# Patient Record
Sex: MALE | Race: BLACK OR AFRICAN AMERICAN | NOT HISPANIC OR LATINO | Employment: FULL TIME | URBAN - METROPOLITAN AREA
[De-identification: names, ages, dates, MRNs, and addresses within clinical notes are randomized per-mention and may not be internally consistent; named-entity substitution may affect disease eponyms.]

---

## 2018-02-19 ENCOUNTER — OFFICE VISIT (OUTPATIENT)
Dept: SURGERY | Facility: CLINIC | Age: 53
End: 2018-02-19
Payer: COMMERCIAL

## 2018-02-19 VITALS
BODY MASS INDEX: 34.36 KG/M2 | DIASTOLIC BLOOD PRESSURE: 80 MMHG | HEIGHT: 69 IN | WEIGHT: 232 LBS | SYSTOLIC BLOOD PRESSURE: 130 MMHG

## 2018-02-19 DIAGNOSIS — D49.0 COLON TUMOR: Primary | ICD-10-CM

## 2018-02-19 PROCEDURE — 99204 OFFICE O/P NEW MOD 45 MIN: CPT | Mod: ,,, | Performed by: SURGERY

## 2018-02-19 PROCEDURE — 3008F BODY MASS INDEX DOCD: CPT | Mod: ,,, | Performed by: SURGERY

## 2018-02-19 RX ORDER — ATORVASTATIN CALCIUM 20 MG/1
TABLET, FILM COATED ORAL
COMMUNITY
Start: 2017-05-12 | End: 2018-03-01 | Stop reason: DRUGHIGH

## 2018-02-19 RX ORDER — AZILSARTAN KAMEDOXOMIL AND CHLORTHALIDONE 40; 25 MG/1; MG/1
TABLET ORAL
COMMUNITY
Start: 2018-01-11 | End: 2018-08-10

## 2018-02-19 RX ORDER — AMLODIPINE BESYLATE 5 MG/1
5 TABLET ORAL
COMMUNITY
End: 2018-08-10 | Stop reason: SDUPTHER

## 2018-02-19 NOTE — PROGRESS NOTES
Subjective:       Patient ID: Vince Kwon is a 52 y.o. male.    Chief Complaint: Other (Referred by  to eval colon mass)      HPI:  Patient was found to have her large tumor in his right colon on colonoscopy. Patient is a bit of a poor historian. From talking him am not sure whether this was a screening colonoscopy or if he had some midabdominal pain and that was the reason for the scope. He did have some pain but is been going on for years and usually associated with spicy foods.    Currently patient does not have any symptoms. He does have an appointment with oncologist later today. He was not 100 percent aware that he had colon tumor.    Past Medical History:   Diagnosis Date    Hyperlipidemia     Hypertension      Past Surgical History:   Procedure Laterality Date    FINGER SURGERY       Review of patient's allergies indicates:  No Known Allergies  Medication List with Changes/Refills   Current Medications    AMLODIPINE (NORVASC) 5 MG TABLET    Take 5 mg by mouth.    ATORVASTATIN (LIPITOR) 20 MG TABLET    TAKE 1 TABLET BY MOUTH EVERY DAY    EDARBYCLOR 40-25 MG TAB         Family History   Problem Relation Age of Onset    Diabetes Mother     Heart disease Mother     Diabetes Sister     Breast cancer Sister     Diabetes Maternal Aunt     Diabetes Maternal Grandmother     Heart disease Maternal Grandmother     Heart disease Maternal Grandfather      Social History     Social History    Marital status: Single     Spouse name: N/A    Number of children: N/A    Years of education: N/A     Social History Main Topics    Smoking status: Former Smoker    Smokeless tobacco: Never Used    Alcohol use Yes      Comment: occ    Drug use: No    Sexual activity: Not Asked     Other Topics Concern    None     Social History Narrative    None         Review of Systems   Constitutional: Negative for appetite change, chills, fever and unexpected weight change.   HENT: Negative for hearing loss,  rhinorrhea, sore throat and voice change.    Eyes: Negative for photophobia and visual disturbance.   Respiratory: Negative for cough, choking and shortness of breath.    Cardiovascular: Negative for chest pain, palpitations and leg swelling.   Gastrointestinal: Negative for abdominal pain, blood in stool, constipation, diarrhea, nausea and vomiting.   Endocrine: Negative for cold intolerance, heat intolerance, polydipsia and polyuria.   Musculoskeletal: Negative for arthralgias, back pain, joint swelling and neck stiffness.   Skin: Negative for color change, pallor and rash.   Neurological: Negative for dizziness, seizures, syncope and headaches.   Hematological: Negative for adenopathy. Does not bruise/bleed easily.   Psychiatric/Behavioral: Negative for agitation, behavioral problems and confusion.       Objective:      Physical Exam   Constitutional: He appears well-developed and well-nourished.  Non-toxic appearance. No distress.   HENT:   Head: Normocephalic and atraumatic. Head is without abrasion and without laceration.   Right Ear: External ear normal.   Left Ear: External ear normal.   Nose: Nose normal.   Mouth/Throat: Oropharynx is clear and moist.   Eyes: EOM are normal. Pupils are equal, round, and reactive to light.   Neck: Trachea normal. No tracheal deviation and normal range of motion present. No thyroid mass and no thyromegaly present.   Cardiovascular: Normal rate and regular rhythm.    Pulmonary/Chest: Effort normal. No accessory muscle usage. No tachypnea. No respiratory distress.   Abdominal: Soft. Normal appearance and bowel sounds are normal. He exhibits no distension and no mass. There is no hepatosplenomegaly. There is no tenderness. There is no tenderness at McBurney's point and negative Park's sign. No hernia.   Lymphadenopathy:     He has no cervical adenopathy.     He has no axillary adenopathy.        Right: No inguinal adenopathy present.        Left: No inguinal adenopathy  present.   Neurological: He is alert. Coordination and gait normal.   Skin: Skin is warm and intact.   Psychiatric: He has a normal mood and affect. His speech is normal and behavior is normal.       Assessment/Plan:   Colon tumor  -     CBC auto differential; Future; Expected date: 02/19/2018  -     CEA; Future; Expected date: 02/19/2018  -     Comprehensive metabolic panel; Future; Expected date: 02/19/2018  -     EKG 12-lead; Future  -     Ambulatory Referral to External Surgery        Planned procedure: Right colectomy    Mefoxin 2 gm IV on call to OR    NPO past midnight    Wade cloth scrub per protocol    SCDs Bilateral Lower Extremities    I discussed the proposed procedures the the patient including risks, benefits, indications, alternatives and special concerns.  The patient appears to understand and agrees to go ahead with surgery.  I have made no promises, warranties or verbal agreements beyond what was discussed above.    No Follow-up on file.

## 2018-02-20 ENCOUNTER — OFFICE VISIT (OUTPATIENT)
Dept: HEMATOLOGY/ONCOLOGY | Facility: CLINIC | Age: 53
End: 2018-02-20
Payer: COMMERCIAL

## 2018-02-20 VITALS
DIASTOLIC BLOOD PRESSURE: 87 MMHG | SYSTOLIC BLOOD PRESSURE: 154 MMHG | RESPIRATION RATE: 18 BRPM | BODY MASS INDEX: 34.96 KG/M2 | WEIGHT: 230.69 LBS | HEART RATE: 61 BPM | TEMPERATURE: 99 F | HEIGHT: 68 IN

## 2018-02-20 DIAGNOSIS — K63.89 COLONIC MASS: ICD-10-CM

## 2018-02-20 PROCEDURE — 99204 OFFICE O/P NEW MOD 45 MIN: CPT | Mod: ,,, | Performed by: INTERNAL MEDICINE

## 2018-02-20 PROCEDURE — 3008F BODY MASS INDEX DOCD: CPT | Mod: ,,, | Performed by: INTERNAL MEDICINE
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
## 2018-02-20 NOTE — LETTER
February 20, 2018      Jose Hoang Jr., MD  [address]  [address]  [place] [facility] [address]           Critical access hospital Hematology Oncology  [address]  [address]  [place] [address]  Phone: [phone]  Fax: [phone]          Patient: Vince Kwon   MR Number: [id number]   YOB: 1965   Date of Visit: 2/20/2018       Dear Dr. Jose Hoang Jr.:    Thank you for referring Vince Kwon to me for evaluation. Attached you will find relevant portions of my assessment and plan of care.    If you have questions, please do not hesitate to call me. I look forward to following Vince Kwon along with you.    Sincerely,    Carlo Welch MD    Enclosure  CC:  No Recipients    If you would like to receive this communication electronically, please contact [email or web address] or [phone] to request more information on [facility] access.    For providers and/or their staff who would like to refer a patient to [facility], please contact us through our one-stop-shop provider referral line, [facility], at [phone].    If you feel you have received this communication in error or would no longer like to receive these types of communications, please e-mail [email or web address]

## 2018-02-27 ENCOUNTER — TELEPHONE (OUTPATIENT)
Dept: INTERNAL MEDICINE | Facility: CLINIC | Age: 53
End: 2018-02-27

## 2018-02-27 NOTE — TELEPHONE ENCOUNTER
Sent to doctor wooten    ----- Message from Priscilla Calle sent at 2/27/2018 12:42 PM CST -----  Path report 2/16/18

## 2018-02-28 LAB
ALBUMIN SERPL-MCNC: 4.1 G/DL (ref 3.1–4.7)
ALP SERPL-CCNC: 74 IU/L (ref 40–104)
ALT (SGPT): 19 IU/L (ref 3–33)
AST SERPL-CCNC: 29 IU/L (ref 10–40)
BASOPHILS NFR BLD: 0 K/UL (ref 0–0.2)
BASOPHILS NFR BLD: 0.3 %
BILIRUB SERPL-MCNC: 0.9 MG/DL (ref 0.3–1)
BUN SERPL-MCNC: 15 MG/DL (ref 8–20)
CALCIUM SERPL-MCNC: 9.6 MG/DL (ref 7.7–10.4)
CEA: 36.7 NG/ML
CHLORIDE: 100 MMOL/L (ref 98–110)
CO2 SERPL-SCNC: 30.8 MMOL/L (ref 22.8–31.6)
CREATININE: 1.58 MG/DL (ref 0.6–1.4)
EOSINOPHIL NFR BLD: 0.3 K/UL (ref 0–0.7)
EOSINOPHIL NFR BLD: 5.5 %
ERYTHROCYTE [DISTWIDTH] IN BLOOD BY AUTOMATED COUNT: 12.9 % (ref 11.7–14.9)
GLUCOSE: 97 MG/DL (ref 70–99)
GRAN #: 3.1 K/UL (ref 1.4–6.5)
GRAN%: 50.7 %
HCT VFR BLD AUTO: 41.1 % (ref 39–55)
HGB BLD-MCNC: 13.7 G/DL (ref 14–16)
IMMATURE GRANS (ABS): 0 K/UL (ref 0–1)
IMMATURE GRANULOCYTES: 0.2 %
LYMPH #: 1.9 K/UL (ref 1.2–3.4)
LYMPH%: 32.3 %
MCH RBC QN AUTO: 27.5 PG (ref 25–35)
MCHC RBC AUTO-ENTMCNC: 33.3 G/DL (ref 31–36)
MCV RBC AUTO: 82.5 FL (ref 80–100)
MONO #: 0.7 K/UL (ref 0.1–0.6)
MONO%: 11 %
NUCLEATED RBCS: 0 %
PLATELET # BLD AUTO: 224 K/UL (ref 140–440)
PMV BLD AUTO: 9.9 FL (ref 8.8–12.7)
POTASSIUM SERPL-SCNC: 2.8 MMOL/L (ref 3.5–5)
PROT SERPL-MCNC: 7.3 G/DL (ref 6–8.2)
RBC # BLD AUTO: 4.98 M/UL (ref 4.3–5.9)
SODIUM: 142 MMOL/L (ref 134–144)
WBC # BLD AUTO: 6 K/UL (ref 5–10)

## 2018-03-01 PROBLEM — E87.6 HYPOKALEMIA: Status: ACTIVE | Noted: 2018-03-01

## 2018-03-01 PROBLEM — M19.019 OSTEOARTHROSIS, LOCALIZED, PRIMARY, SHOULDER REGION: Status: ACTIVE | Noted: 2017-06-06

## 2018-03-01 PROBLEM — C18.2 PRIMARY ADENOCARCINOMA OF ASCENDING COLON: Status: ACTIVE | Noted: 2018-02-20

## 2018-03-01 PROBLEM — R73.01 IMPAIRED FASTING GLUCOSE: Status: ACTIVE | Noted: 2018-03-01

## 2018-03-01 PROBLEM — S43.109S: Status: ACTIVE | Noted: 2017-06-06

## 2018-03-01 RX ORDER — ATORVASTATIN CALCIUM 40 MG/1
40 TABLET, FILM COATED ORAL DAILY
COMMUNITY
End: 2018-04-26

## 2018-03-01 RX ORDER — POTASSIUM CHLORIDE 20 MEQ/1
20 TABLET, EXTENDED RELEASE ORAL DAILY
COMMUNITY
End: 2018-04-26 | Stop reason: SDUPTHER

## 2018-03-01 RX ORDER — LABETALOL 300 MG/1
300 TABLET, FILM COATED ORAL 2 TIMES DAILY
COMMUNITY
End: 2019-03-08 | Stop reason: SDUPTHER

## 2018-03-01 RX ORDER — OMEPRAZOLE 40 MG/1
40 CAPSULE, DELAYED RELEASE ORAL DAILY
COMMUNITY
End: 2018-03-29 | Stop reason: SDUPTHER

## 2018-03-02 ENCOUNTER — OFFICE VISIT (OUTPATIENT)
Dept: INTERNAL MEDICINE | Facility: CLINIC | Age: 53
End: 2018-03-02
Payer: COMMERCIAL

## 2018-03-02 VITALS
BODY MASS INDEX: 35.01 KG/M2 | HEIGHT: 68 IN | HEART RATE: 77 BPM | TEMPERATURE: 98 F | DIASTOLIC BLOOD PRESSURE: 80 MMHG | SYSTOLIC BLOOD PRESSURE: 130 MMHG | OXYGEN SATURATION: 95 % | WEIGHT: 231 LBS

## 2018-03-02 DIAGNOSIS — E87.6 HYPOKALEMIA: ICD-10-CM

## 2018-03-02 DIAGNOSIS — Z01.818 PREOPERATIVE EXAMINATION: Primary | ICD-10-CM

## 2018-03-02 PROCEDURE — 99213 OFFICE O/P EST LOW 20 MIN: CPT | Mod: ,,, | Performed by: INTERNAL MEDICINE

## 2018-03-02 NOTE — PROGRESS NOTES
Subjective:       Patient ID: Vince Kwon is a 52 y.o. male.    Chief Complaint: Pre-op Exam    Here for preoperative evaluation.  Patient was recently sent for a screening colonoscopy and was found to have an adenocarcinoma in his ascending colon.  He is scheduled for partial colectomy with reanastomosis vs colostomy depending on findings at surgery.  He has a h/o HTN, hyperlipidemia but no known CAD.  Did have TIA in 2005 but hasn't had any other issues on ASA therapy (currently on hold for surgery).  Had preop labs earlier this week;  Creat 1.58, K 2.8.  EKG with nonspecific TWI in III.  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  Physically demanding job and does not have any chest pain or SOB.   Has a h/o hypokalemia but hadn't been taking potassium due to constipation.  Has been taking it the last two days since he did the labs.        Review of Systems   Constitutional: Negative for chills, diaphoresis, fatigue, fever and unexpected weight change.   HENT: Negative for congestion, ear discharge, ear pain, hearing loss, postnasal drip, rhinorrhea, trouble swallowing and voice change.    Eyes: Negative for photophobia, pain, discharge, redness, itching and visual disturbance.   Respiratory: Negative for apnea, cough, choking, chest tightness, shortness of breath and wheezing.    Cardiovascular: Negative for chest pain, palpitations and leg swelling.   Gastrointestinal: Negative for abdominal pain, blood in stool, constipation, diarrhea, nausea, rectal pain and vomiting.   Endocrine: Negative for cold intolerance, heat intolerance, polydipsia and polyuria.   Genitourinary: Negative for decreased urine volume, difficulty urinating, discharge, dysuria, flank pain, frequency, genital sores, hematuria, penile pain, penile swelling, scrotal swelling, testicular pain and urgency.   Musculoskeletal: Negative for arthralgias, back pain, gait problem, joint swelling, myalgias, neck pain  and neck stiffness.   Skin: Negative for color change, rash and wound.   Allergic/Immunologic: Negative for environmental allergies and food allergies.   Neurological: Negative for dizziness, tremors, seizures, syncope, facial asymmetry, speech difficulty, weakness, light-headedness, numbness and headaches.   Hematological: Negative for adenopathy. Does not bruise/bleed easily.   Psychiatric/Behavioral: Negative for confusion, hallucinations, sleep disturbance and suicidal ideas. The patient is not nervous/anxious.        Past Medical History:   Diagnosis Date    Colonic mass - right colon 2/20/2018    Hyperlipidemia     Hypertension     Hypokalemia 3/1/2018      Past Surgical History:   Procedure Laterality Date    FINGER SURGERY         Family History   Problem Relation Age of Onset    Diabetes Mother     Heart disease Mother     Diabetes Sister     Breast cancer Sister     Diabetes Maternal Aunt     Diabetes Maternal Grandmother     Heart disease Maternal Grandmother     Heart disease Maternal Grandfather        Social History     Social History    Marital status: Single     Spouse name: N/A    Number of children: N/A    Years of education: N/A     Occupational History    shipping and recieving      Social History Main Topics    Smoking status: Former Smoker    Smokeless tobacco: Never Used    Alcohol use Yes      Comment: occ    Drug use: No    Sexual activity: Not Asked     Other Topics Concern    None     Social History Narrative    Lives alone       Current Outpatient Prescriptions   Medication Sig Dispense Refill    amLODIPine (NORVASC) 5 MG tablet Take 5 mg by mouth.      atorvastatin (LIPITOR) 40 MG tablet Take 40 mg by mouth once daily.      EDARBYCLOR 40-25 mg Tab       labetalol (NORMODYNE) 300 MG tablet Take 300 mg by mouth 2 (two) times daily.      omeprazole (PRILOSEC) 40 MG capsule Take 40 mg by mouth once daily.      potassium chloride SA (K-DUR,KLOR-CON) 20 MEQ tablet  "Take 20 mEq by mouth once daily.       No current facility-administered medications for this visit.        Review of patient's allergies indicates:  No Known Allergies  Objective:      Blood pressure 130/80, pulse 77, temperature 98.2 °F (36.8 °C), temperature source Temporal, height 5' 8" (1.727 m), weight 104.8 kg (231 lb), SpO2 95 %. Body mass index is 35.12 kg/m².   Physical Exam   Constitutional: He appears well-developed. No distress.   HENT:   Nose: Nose normal.   Mouth/Throat: Oropharynx is clear and moist.   Eyes: Conjunctivae are normal. Right eye exhibits no discharge. Left eye exhibits no discharge. No scleral icterus.   Neck: Carotid bruit is not present.   Cardiovascular: Normal rate, regular rhythm and normal heart sounds.    No murmur heard.  Pulmonary/Chest: Effort normal and breath sounds normal. No respiratory distress. He has no decreased breath sounds. He has no wheezes. He has no rhonchi. He has no rales.   Abdominal: Soft. He exhibits no distension. There is no tenderness. There is no rebound and no guarding.   Musculoskeletal: He exhibits no edema.   Neurological: He is alert. He displays no tremor.   Skin: Skin is warm and dry.   Psychiatric: He has a normal mood and affect. His speech is normal.   Nursing note and vitals reviewed.          Assessment:       1. Preoperative examination    2. Hypokalemia        Plan:       Vince was seen today for pre-op exam.    Diagnoses and all orders for this visit:    Preoperative examination  Comments:  No known CAD, >4 METS activity level without symptoms, nonspecific findings on EKG.  No further cardiac workup required.  Standard DVT precautions.    Hypokalemia  Comments:  Take potassium BID today and then go back to once daily.  Recheck Monday.  Orders:  -     Basic metabolic panel; Future  -     Basic metabolic panel         "

## 2018-03-05 LAB
BUN SERPL-MCNC: 17 MG/DL (ref 8–20)
CALCIUM SERPL-MCNC: 9.9 MG/DL (ref 7.7–10.4)
CHLORIDE: 100 MMOL/L (ref 98–110)
CO2 SERPL-SCNC: 32.4 MMOL/L (ref 22.8–31.6)
CREATININE: 1.56 MG/DL (ref 0.6–1.4)
GLUCOSE: 103 MG/DL (ref 70–99)
POTASSIUM SERPL-SCNC: 3.5 MMOL/L (ref 3.5–5)
SODIUM: 143 MMOL/L (ref 134–144)

## 2018-03-06 ENCOUNTER — TELEPHONE (OUTPATIENT)
Dept: HEMATOLOGY/ONCOLOGY | Facility: CLINIC | Age: 53
End: 2018-03-06

## 2018-03-06 NOTE — TELEPHONE ENCOUNTER
----- Message from Brittanie Mo sent at 3/6/2018 10:11 AM CST -----  Pt sister alaina diaz called in said dr tapia wrote him two rx for pt before his surgery tomorrow the medication is  Erythromycin 500mg and neomycin 500mg. He was suppose to take this medication before the surgery (she wasn't sure how many days before)   Said one medication was 600 and other one was 241 and they cant afford that.  Pt did not start the medication and has surgery on his colon tomorrow.  I tried to get more information but pt sister was in a hurry getting her mammogram done.  Call back  [phone]    Called Judy at Dr. Gutierrez's office. She said it was her office that gave the scripts to him. She said she will call him and see what he needs.

## 2018-03-08 LAB
BASOPHILS NFR BLD: 0 K/UL (ref 0–0.2)
BASOPHILS NFR BLD: 0.1 %
BUN SERPL-MCNC: 10 MG/DL (ref 8–20)
CALCIUM SERPL-MCNC: 8.4 MG/DL (ref 7.7–10.4)
CHLORIDE: 99 MMOL/L (ref 98–110)
CO2 SERPL-SCNC: 31.3 MMOL/L (ref 22.8–31.6)
CREATININE: 1.19 MG/DL (ref 0.6–1.4)
EOSINOPHIL NFR BLD: 0.1 K/UL (ref 0–0.7)
EOSINOPHIL NFR BLD: 1.7 %
ERYTHROCYTE [DISTWIDTH] IN BLOOD BY AUTOMATED COUNT: 13.1 % (ref 11.7–14.9)
GLUCOSE: 107 MG/DL (ref 70–99)
GRAN #: 5.9 K/UL (ref 1.4–6.5)
GRAN%: 71 %
HCT VFR BLD AUTO: 42.1 % (ref 39–55)
HGB BLD-MCNC: 13.8 G/DL (ref 14–16)
IMMATURE GRANS (ABS): 0 K/UL (ref 0–1)
IMMATURE GRANULOCYTES: 0.2 %
LYMPH #: 1.5 K/UL (ref 1.2–3.4)
LYMPH%: 18.3 %
MCH RBC QN AUTO: 27.7 PG (ref 25–35)
MCHC RBC AUTO-ENTMCNC: 32.8 G/DL (ref 31–36)
MCV RBC AUTO: 84.4 FL (ref 80–100)
MONO #: 0.7 K/UL (ref 0.1–0.6)
MONO%: 8.7 %
NUCLEATED RBCS: 0 %
PLATELET # BLD AUTO: 215 K/UL (ref 140–440)
PMV BLD AUTO: 9.3 FL (ref 8.8–12.7)
POTASSIUM SERPL-SCNC: 2.7 MMOL/L (ref 3.5–5)
RBC # BLD AUTO: 4.99 M/UL (ref 4.3–5.9)
SODIUM: 137 MMOL/L (ref 134–144)
WBC # BLD AUTO: 8.4 K/UL (ref 5–10)

## 2018-03-09 LAB
BASOPHILS NFR BLD: 0 K/UL (ref 0–0.2)
BASOPHILS NFR BLD: 0.2 %
BUN SERPL-MCNC: 6 MG/DL (ref 8–20)
CALCIUM SERPL-MCNC: 8.9 MG/DL (ref 7.7–10.4)
CHLORIDE: 96 MMOL/L (ref 98–110)
CO2 SERPL-SCNC: 35 MMOL/L (ref 22.8–31.6)
CREATININE: 1.11 MG/DL (ref 0.6–1.4)
EOSINOPHIL NFR BLD: 0 K/UL (ref 0–0.7)
EOSINOPHIL NFR BLD: 0.3 %
ERYTHROCYTE [DISTWIDTH] IN BLOOD BY AUTOMATED COUNT: 13 % (ref 11.7–14.9)
GLUCOSE: 135 MG/DL (ref 70–99)
GRAN #: 7.8 K/UL (ref 1.4–6.5)
GRAN%: 77.5 %
HCT VFR BLD AUTO: 45.3 % (ref 39–55)
HGB BLD-MCNC: 14.9 G/DL (ref 14–16)
IMMATURE GRANS (ABS): 0.1 K/UL (ref 0–1)
IMMATURE GRANULOCYTES: 0.5 %
LYMPH #: 0.8 K/UL (ref 1.2–3.4)
LYMPH%: 8.4 %
MCH RBC QN AUTO: 27.6 PG (ref 25–35)
MCHC RBC AUTO-ENTMCNC: 32.9 G/DL (ref 31–36)
MCV RBC AUTO: 84 FL (ref 80–100)
MONO #: 1.3 K/UL (ref 0.1–0.6)
MONO%: 13.1 %
NUCLEATED RBCS: 0 %
PLATELET # BLD AUTO: 218 K/UL (ref 140–440)
PMV BLD AUTO: 9.6 FL (ref 8.8–12.7)
POTASSIUM SERPL-SCNC: 3.5 MMOL/L (ref 3.5–5)
RBC # BLD AUTO: 5.39 M/UL (ref 4.3–5.9)
SODIUM: 138 MMOL/L (ref 134–144)
WBC # BLD AUTO: 10 K/UL (ref 5–10)

## 2018-03-16 ENCOUNTER — OFFICE VISIT (OUTPATIENT)
Dept: SURGERY | Facility: CLINIC | Age: 53
End: 2018-03-16
Payer: COMMERCIAL

## 2018-03-16 VITALS
SYSTOLIC BLOOD PRESSURE: 130 MMHG | BODY MASS INDEX: 35.01 KG/M2 | DIASTOLIC BLOOD PRESSURE: 80 MMHG | HEIGHT: 68 IN | WEIGHT: 231 LBS

## 2018-03-16 DIAGNOSIS — D49.0 COLON TUMOR: Primary | ICD-10-CM

## 2018-03-16 PROCEDURE — 99024 POSTOP FOLLOW-UP VISIT: CPT | Mod: ,,, | Performed by: SURGERY

## 2018-03-16 NOTE — PROGRESS NOTES
Subjective:       Patient ID: Vince Kwon is a 52 y.o. male.    Chief Complaint: Post-op Evaluation (FU DOS 3/7/18 Right Colectomy)      HPI:  Vince Kwon is here for post-op. Patient has no systemic complaints. Post operative   pain is under control.  Tolerating diet, no nausea/vomiting.  Having normal bowel movements.        Objective:      Physical Exam   Constitutional: He is oriented to person, place, and time. He is cooperative. No distress.   Abdominal: Soft. He exhibits no distension. There is no tenderness. There is no rebound and no guarding.   Neurological: He is oriented to person, place, and time.   Skin:   Incisions are clean, dry and intact  There is no evidence of infection, hematoma or seroma        Assessment/Plan:   Colon tumor      Well postop. Final path confirmed cancer with no katie involvement. Patient seen his oncologist on Monday.      Follow-up if symptoms worsen or fail to improve.

## 2018-03-19 ENCOUNTER — TELEPHONE (OUTPATIENT)
Dept: HEMATOLOGY/ONCOLOGY | Facility: CLINIC | Age: 53
End: 2018-03-19

## 2018-03-19 ENCOUNTER — OFFICE VISIT (OUTPATIENT)
Dept: HEMATOLOGY/ONCOLOGY | Facility: CLINIC | Age: 53
End: 2018-03-19
Payer: COMMERCIAL

## 2018-03-19 VITALS
HEIGHT: 68 IN | RESPIRATION RATE: 18 BRPM | HEART RATE: 71 BPM | TEMPERATURE: 98 F | DIASTOLIC BLOOD PRESSURE: 88 MMHG | WEIGHT: 221.31 LBS | BODY MASS INDEX: 33.54 KG/M2 | SYSTOLIC BLOOD PRESSURE: 141 MMHG

## 2018-03-19 DIAGNOSIS — C18.2 PRIMARY ADENOCARCINOMA OF ASCENDING COLON: Primary | ICD-10-CM

## 2018-03-19 DIAGNOSIS — R97.0 ELEVATED CEA: ICD-10-CM

## 2018-03-19 PROCEDURE — 99215 OFFICE O/P EST HI 40 MIN: CPT | Mod: ,,, | Performed by: INTERNAL MEDICINE

## 2018-03-19 NOTE — PROGRESS NOTES
[facility] Hematology/Oncology  PROGRESS NOTE - 2nd Follow-up Visit      Subjective:       Patient ID:   NAME: Vince Kwon : 1965     52 y.o. male    Referring Doc: Ugo Johnson  Other Physicians: Natalya Gutierrez    Chief Complaint:  Colon ca f/u    History of Present Illness:     Patient returns today for a 2nd regularly scheduled follow-up visit.  The patient is here today to go over the results of the recently ordered labs, tests and studies. He had a recent right colectomy with Dr Gutierrez on 3/7/2018. The Ln's were all negative per the pathology report. He was a stage II. He is here with his sister. He has done well postoperatively and is healing well. He has some staples still in place.             ROS:   GEN: normal without any fever, night sweats or weight loss  HEENT: normal with no HA's, sore throat, stiff neck, changes in vision  CV: normal with no CP, SOB, PND, LIGHT or orthopnea  PULM: normal with no SOB, cough, hemoptysis, sputum or pleuritic pain  GI: normal with no abdominal pain, nausea, vomiting, constipation, diarrhea, melanotic stools, BRBPR, or hematemesis  : normal with no hematuria, dysuria  BREAST: normal with no mass, discharge, pain  SKIN: normal with no rash, erythema, bruising, or swelling    Allergies:  Review of patient's allergies indicates:  No Known Allergies    Medications:    Current Outpatient Prescriptions:     amLODIPine (NORVASC) 5 MG tablet, Take 5 mg by mouth., Disp: , Rfl:     atorvastatin (LIPITOR) 40 MG tablet, Take 40 mg by mouth once daily., Disp: , Rfl:     EDARBYCLOR 40-25 mg Tab, , Disp: , Rfl:     labetalol (NORMODYNE) 300 MG tablet, Take 300 mg by mouth 2 (two) times daily., Disp: , Rfl:     omeprazole (PRILOSEC) 40 MG capsule, Take 40 mg by mouth once daily., Disp: , Rfl:     potassium chloride SA (K-DUR,KLOR-CON) 20 MEQ tablet, Take 20 mEq by mouth once daily., Disp: , Rfl:     PMHx/PSHx Updates:  See patient's last visit with me on 2018.  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    THE TUMOR PENETRATES THE MUSCULARIS PROPRIA AND INVADES THE  PERICOLONIC FAT.    THE PROXIMAL (ILEAL) AND DISTAL (COLONIC) SURGICAL MARGINS OF  RESECTION ARE FREE OF    MALIGNANCY.    A TOTAL OF TWENTY-SEVEN (27) PERICOLONIC LYMPH NODES ARE FREE OF  METASTATIC TUMOR.    APPENDIX: REACTIVE MUCOSAL LYMPHOID HYPERPLASIA.     NO EVIDENCE OF METASTATIC CARCINOMA.    Procedure:  Right hemicolectomy  Specimen Length (if applicable):  <CR-*Specimen Length (if applicable):     12.0 cm>  Tumor Site:  Cecum  Tumor Location  Tumor Size:  Greatest dimension:  4.5 cm  Macroscopic Tumor Perforation:  Not identified  Macroscopic Intactness of Mesorectum:  Histologic Type:  Adenocarcinoma, focal mucinous (colloid) carcinoma  component.  Histologic Grade:  Low grade (well differentiated to moderately  differentiated)  Histologic Features Suggestive of Microsatellite Instability:  Intratumoral Lymphocytic Response (tumor-infiltrating lymphocytes):  Peritumor Lymphocytic Response (Crohn-like response):  Tumor Subtype and Differentiation:  Microscopic Tumor Extension:  Tumor invades through the muscularis  propria into the subserosal adipose tissue or the nonperitonealized  pericolic or perirectal soft tissues but does not extend to the serosal  surface  Margins    TNM Descriptors:  Primary Tumor (pT):  pT3:  Tumor invades through the muscularis propria  into pericolorectal tissues  Regional Lymph Nodes (pN):  pN0:  No regional lymph node metastasis  Distant Metastasis:  Not applicable.      Objective:     Vitals:  Blood pressure (!) 141/88, pulse 71, temperature 98.3 °F (36.8 °C), resp. rate 18, height 5' 8" (1.727 m), weight 100.4 kg (221 lb 4.8 " oz).    Physical Examination:   GEN: no apparent distress, comfortable; AAOx3  HEAD: atraumatic and normocephalic  EYES: no pallor, no icterus, PERRLA  ENT: OMM, no pharyngeal erythema, external ears WNL; no nasal discharge; no thrush  NECK: no masses, thyroid normal, trachea midline, no LAD/LN's, supple  CV: RRR with no murmur; normal pulse; normal S1 and S2; no pedal edema  CHEST: Normal respiratory effort; CTAB; normal breath sounds; no wheeze or crackles  ABDOM: nontender and nondistended; soft; normal bowel sounds; no rebound/guarding  MUSC/Skeletal: ROM normal; no crepitus; joints normal; no deformities or arthropathy  EXTREM: no clubbing, cyanosis, inflammation or swelling  SKIN: no rashes, lesions, ulcers, petechiae or subcutaneous nodules  : no escamilla  NEURO: grossly intact; motor/sensory WNL; AAOx3; no tremors  PSYCH: normal mood, affect and behavior  LYMPH: normal cervical, supraclavicular, axillary and groin LN's            Labs:     3/9/2018  Lab Results   Component Value Date    WBC 10.0 03/09/2018    HGB 14.9 03/09/2018    HCT 45.3 03/09/2018    MCV 84.0 03/09/2018     03/09/2018     BMP  Lab Results   Component Value Date     03/09/2018    K 3.5 03/09/2018    CL 96 (L) 03/09/2018    CO2 35.0 (H) 03/09/2018    BUN 6 (L) 03/09/2018    CREATININE 1.11 03/09/2018    CALCIUM 8.9 03/09/2018     Lab Results   Component Value Date    AST 29 02/28/2018    ALKPHOS 74 02/28/2018    BILITOT 0.9 02/28/2018     CEA was 36.7      Radiology/Diagnostic Studies:    X-ray Chest Pa And Lateral    Result Date: 2/28/2018  CHEST ROUT RAD, 2 VWS,FRNT/LAT XR Indication: 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 Comparison: None Findings: Cardiac silhouette size is normal. Lungs are clear without effusion. No pneumothorax. No acute osseous abnormality. IMPRESSION: No acute pulmonary process. Read and electronically signed by: Shubham Marino MD on 2/28/2018 6:22 PM CST SHUBHAM MARINO MD    Ct  Abdomen Pelvis With Contrast    Result Date: 2/23/2018  CMS MANDATED QUALITY DATA - CT RADIATION ? 436 All CT scans at this facility utilize dose modulation, iterative reconstruction, and/or weight based dosing when appropriate to reduce radiation dose to as low as reasonably achievable. CLINICAL HISTORY: 52 years (1965) Male K63.9 TECHNIQUE: MDI ABDOMEN AND PELVIS W  CONTRAST CT. 296 images obtained. Axial CT images of the abdomen and pelvis were obtained from the dome of the diaphragm to the proximal thigh. CONTRAST: 100 mL of IV Omni 350 was administered uneventfully by IV. Oral contrast was also administered COMPARISON: None available. FINDINGS: Lower Thorax: The lung bases are clear. The heart is normal in size and there is no pericardial effusion. CT Abdomen: Liver: The liver is normal size and imaging appearance. Gallbladder: The gallbladder is unremarkable without acute cholecystitis. Biliary Tree: There is no intra or extrahepatic duct dilation. Spleen: The spleen is normal. Pancreas: The pancreas is normal. Adrenal Glands: The adrenal glands appear within normal limits. Kidneys: The kidneys are normal in imaging appearance without hydronephrosis or hydroureter. Vasculature: The aorta is normal in course and caliber. Lymph nodes: No abdominal lymphadenopathy is seen by size criteria. Intraperitoneal structures: There is no ascites. Bowel:  There is an apple core like lesion in the ascending colon extending over a length of approximately 5 cm (10 o'clock-8 o'clock position involving a majority of the circumference) the soft tissue mass measures approximately 2 cm in thickness. There are numerous small rounded lymph nodes in the cecal mesentery, none of which are enlarged by size criteria, however given the morphology and location these may be involved, for example a 15 x 7 mm node (image 121). Abdominal wall: The abdominal wall and musculature are normal. Musculoskeletal: There is a transitional  lumbosacral vertebral body (S1) with a hypoplastic disc at S1-S2. There is moderate to severe disc height loss at L4-L5. No aggressive appearing lytic or blastic lesion is identified. CT Pelvis: Bladder: The urinary bladder is within normal limits. Reproductive Organs: The prostate and seminal vesicles are within normal limits. Pelvic Lymph nodes: No pelvic lymphadenopathy or pelvic mass is identified. IMPRESSION: 1. Apple-core like lesion in the descending colon most consistent with a primary colonic adenocarcinoma, there is no evidence of obstruction, pneumatosis intra-abdominal free air or abscess. 2. No lymphadenopathy by size criteria and no finding to specifically suggest metastatic disease in the abdomen or pelvis. Read and electronically signed by: Alexsander Colvin MD on 2/23/2018 9:53 AM CST ALEXSANDER COLVIN MD      I have reviewed all available lab results and radiology reports.    Assessment/Plan:   (1) 52 y.o. male with diagnosis of right colon mass found on recent colonoscopy on 2/16/2018 with Dr Johnson.  - he had been seen by Dr Gutierrez with [facility]   - s/p right colectomy with Dr Gutierrez on 3/7/2018  - 27 LN's were all negative  - T3 N0 and Stage IIA  - low grade adenocarcinoma  - CEA 36.7  - will see if we can set up PEt scan  - will repeat CEA next month  - will most likely not need chemotherapy if he remains a Stage II  - will ask pathology to run MSI/MMR studies on the specimen     (2) HTN and hypercholesterolemia     (3) Prior mini-CVA - HTN related; no subsequent deficits; sounds like he had a TIA        PLAN:  1. Schedule PET scan if possible  2. Repeat CEA next month (April)  3. F/u with Dr Gutierrez and Dr Johnson, and PCP  4. RTC in 4 weeks  Fax note to Alex, Jose Hoang Jr, MD, and Matt    Discussion:       I spent over 25 mins of time with the patient. Reviewed results of the recently ordered labs, tests and studies; made directives with regards to the results. Over half  of this time was spent couseling and coordinating care.    I have explained all of the above in detail and the patient understands all of the current recommendation(s). I have answered all of their questions to the best of my ability and to their complete satisfaction.   The patient is to continue with the current management plan.            Electronically signed by Carlo Welch MD

## 2018-03-19 NOTE — LETTER
March 19, 2018      Jose Hoang Jr., MD  [address]  [address]  [place] [address]           [facility]  [address]  [address]  [place] [address]  Phone: [phone]  Fax: [phone]          Patient: Vince Kwon   MR Number: [id number]   YOB: 1965   Date of Visit: 3/19/2018       Dear Dr. Jose Hoang Jr.:    Thank you for referring Vince Kwon to me for evaluation. Attached you will find relevant portions of my assessment and plan of care.    If you have questions, please do not hesitate to call me. I look forward to following Vince Kwon along with you.    Sincerely,    Carlo Welch MD    Enclosure  CC:  No Recipients    If you would like to receive this communication electronically, please contact [email or web address] or [phone] to request more information on [employer] Link access.    For providers and/or their staff who would like to refer a patient to [facility], please contact us through our one-stop-shop provider referral line, [facility], at [phone].    If you feel you have received this communication in error or would no longer like to receive these types of communications, please e-mail [email or web address]

## 2018-03-21 ENCOUNTER — DOCUMENTATION ONLY (OUTPATIENT)
Dept: SURGERY | Facility: CLINIC | Age: 53
End: 2018-03-21

## 2018-03-21 NOTE — PROGRESS NOTES
Remaining staples removed w/o complication.. No evidence of infection, hematoma or seroma. Incision clean, dry and intact.. Pt did not have any questions or concerns

## 2018-03-29 RX ORDER — OMEPRAZOLE 40 MG/1
40 CAPSULE, DELAYED RELEASE ORAL DAILY
Qty: 90 CAPSULE | Refills: 1 | Status: SHIPPED | OUTPATIENT
Start: 2018-03-29 | End: 2019-02-08

## 2018-04-11 ENCOUNTER — TELEPHONE (OUTPATIENT)
Dept: HEMATOLOGY/ONCOLOGY | Facility: CLINIC | Age: 53
End: 2018-04-11

## 2018-04-11 LAB
ALBUMIN SERPL-MCNC: 4 G/DL (ref 3.1–4.7)
ALP SERPL-CCNC: 76 IU/L (ref 40–104)
ALT (SGPT): 17 IU/L (ref 3–33)
AST SERPL-CCNC: 21 IU/L (ref 10–40)
BASOPHILS NFR BLD: 0 K/UL (ref 0–0.2)
BASOPHILS NFR BLD: 0.4 %
BILIRUB SERPL-MCNC: 1 MG/DL (ref 0.3–1)
BUN SERPL-MCNC: 21 MG/DL (ref 8–20)
CALCIUM SERPL-MCNC: 9.8 MG/DL (ref 7.7–10.4)
CEA: 2.1 NG/ML
CHLORIDE: 99 MMOL/L (ref 98–110)
CO2 SERPL-SCNC: 28.2 MMOL/L (ref 22.8–31.6)
CREATININE: 1.54 MG/DL (ref 0.6–1.4)
EOSINOPHIL NFR BLD: 0.3 K/UL (ref 0–0.7)
EOSINOPHIL NFR BLD: 5.3 %
ERYTHROCYTE [DISTWIDTH] IN BLOOD BY AUTOMATED COUNT: 13.3 % (ref 11.7–14.9)
GLUCOSE SERPL-MCNC: 95 MG/DL (ref 70–99)
GLUCOSE: 100 MG/DL (ref 70–99)
GRAN #: 2.4 K/UL (ref 1.4–6.5)
GRAN%: 44.8 %
HCT VFR BLD AUTO: 41.5 % (ref 39–55)
HGB BLD-MCNC: 13.6 G/DL (ref 14–16)
IMMATURE GRANS (ABS): 0 K/UL (ref 0–1)
IMMATURE GRANULOCYTES: 0.2 %
LYMPH #: 2.1 K/UL (ref 1.2–3.4)
LYMPH%: 39.8 %
MCH RBC QN AUTO: 27.2 PG (ref 25–35)
MCHC RBC AUTO-ENTMCNC: 32.8 G/DL (ref 31–36)
MCV RBC AUTO: 83 FL (ref 80–100)
MONO #: 0.5 K/UL (ref 0.1–0.6)
MONO%: 9.5 %
NUCLEATED RBCS: 0 %
PLATELET # BLD AUTO: 202 K/UL (ref 140–440)
PMV BLD AUTO: 9.8 FL (ref 8.8–12.7)
POTASSIUM SERPL-SCNC: 3 MMOL/L (ref 3.5–5)
PROT SERPL-MCNC: 7.3 G/DL (ref 6–8.2)
RBC # BLD AUTO: 5 M/UL (ref 4.3–5.9)
SODIUM: 139 MMOL/L (ref 134–144)
WBC # BLD AUTO: 5.3 K/UL (ref 5–10)

## 2018-04-11 NOTE — NURSING
Patient notified of Potassium result and he stated he will call Dr. Hoang to either decrease his fluid medication or increase his K+

## 2018-04-12 ENCOUNTER — TELEPHONE (OUTPATIENT)
Dept: INTERNAL MEDICINE | Facility: CLINIC | Age: 53
End: 2018-04-12

## 2018-04-12 NOTE — TELEPHONE ENCOUNTER
----- Message from Jose Hoang Jr., MD sent at 4/12/2018 10:19 AM CDT -----  See if he is still taking his potassium.  If not, I want him back on it.  If he is taking it, we need to increase the dose and I will send a new Rx.    ----- Message -----  From: Alyssa Motta LPN  Sent: 4/12/2018  10:13 AM  To: MD Dr. Rebekah Abreu Jr. asked that these results be sent to you for review. Thanks, Alyssa

## 2018-04-16 ENCOUNTER — OFFICE VISIT (OUTPATIENT)
Dept: HEMATOLOGY/ONCOLOGY | Facility: CLINIC | Age: 53
End: 2018-04-16
Payer: COMMERCIAL

## 2018-04-16 VITALS
HEART RATE: 74 BPM | BODY MASS INDEX: 33.78 KG/M2 | WEIGHT: 222.88 LBS | HEIGHT: 68 IN | SYSTOLIC BLOOD PRESSURE: 146 MMHG | RESPIRATION RATE: 18 BRPM | TEMPERATURE: 99 F | DIASTOLIC BLOOD PRESSURE: 90 MMHG

## 2018-04-16 DIAGNOSIS — C18.2 PRIMARY ADENOCARCINOMA OF ASCENDING COLON: Primary | ICD-10-CM

## 2018-04-16 PROCEDURE — 99214 OFFICE O/P EST MOD 30 MIN: CPT | Mod: ,,, | Performed by: INTERNAL MEDICINE

## 2018-04-16 NOTE — LETTER
April 16, 2018      Jose Hoang Jr., MD  [address]  [address]  [place] [address]           [facility]  [address]  [address]  [place] [address]  Phone: [phone]  Fax: [phone]          Patient: Vince Kwon   MR Number: [id number]   YOB: 1965   Date of Visit: 4/16/2018       Dear Dr. Jose Hoang Jr.:    Thank you for referring Vince Kwon to me for evaluation. Attached you will find relevant portions of my assessment and plan of care.    If you have questions, please do not hesitate to call me. I look forward to following Vince Kwon along with you.    Sincerely,    Carlo Welch MD    Enclosure  CC:  No Recipients    If you would like to receive this communication electronically, please contact [email or web address] or [phone] to request more information on [employer] Link access.    For providers and/or their staff who would like to refer a patient to [facility], please contact us through our one-stop-shop provider referral line, [facility], at [phone].    If you feel you have received this communication in error or would no longer like to receive these types of communications, please e-mail [email or web address]

## 2018-04-16 NOTE — PROGRESS NOTES
[facility] Hematology/Oncology  PROGRESS NOTE       Subjective:       Patient ID:   NAME: Vince Kwon : 1965     52 y.o. male    Referring Doc: Ugo Johnson  Other Physicians: Natalya Gutierrez    Chief Complaint:  Colon ca f/u    History of Present Illness:     Patient returns today for a 3rd regularly scheduled follow-up visit.  The patient is here today to go over the results of the recently ordered labs, tests and studies. He had a recent right colectomy with Dr Gutierrez on 3/7/2018. The LN's were all negative per the pathology report. He was a stage II. He is here with his sister. He has done well postoperatively and is healing well. He had recent PET and is here to go over results.             ROS:   GEN: normal without any fever, night sweats or weight loss  HEENT: normal with no HA's, sore throat, stiff neck, changes in vision  CV: normal with no CP, SOB, PND, LIGHT or orthopnea  PULM: normal with no SOB, cough, hemoptysis, sputum or pleuritic pain  GI: normal with no abdominal pain, nausea, vomiting, constipation, diarrhea, melanotic stools, BRBPR, or hematemesis  : normal with no hematuria, dysuria  BREAST: normal with no mass, discharge, pain  SKIN: normal with no rash, erythema, bruising, or swelling    Allergies:  Review of patient's allergies indicates:  No Known Allergies    Medications:    Current Outpatient Prescriptions:     amLODIPine (NORVASC) 5 MG tablet, Take 5 mg by mouth., Disp: , Rfl:     atorvastatin (LIPITOR) 40 MG tablet, Take 40 mg by mouth once daily., Disp: , Rfl:     EDARBYCLOR 40-25 mg Tab, , Disp: , Rfl:     labetalol (NORMODYNE) 300 MG tablet, Take 300 mg by mouth 2 (two) times daily., Disp: , Rfl:     omeprazole (PRILOSEC) 40 MG capsule, Take 1 capsule (40 mg total) by mouth once daily., Disp: 90 capsule, Rfl: 1    potassium chloride SA (K-DUR,KLOR-CON) 20 MEQ tablet, Take 20 mEq by mouth once daily., Disp: , Rfl:     PMHx/PSHx Updates:  See patient's last visit with me on  "3/19/2018.  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    THE TUMOR PENETRATES THE MUSCULARIS PROPRIA AND INVADES THE  PERICOLONIC FAT.    THE PROXIMAL (ILEAL) AND DISTAL (COLONIC) SURGICAL MARGINS OF  RESECTION ARE FREE OF    MALIGNANCY.    A TOTAL OF TWENTY-SEVEN (27) PERICOLONIC LYMPH NODES ARE FREE OF  METASTATIC TUMOR.    APPENDIX: REACTIVE MUCOSAL LYMPHOID HYPERPLASIA.     NO EVIDENCE OF METASTATIC CARCINOMA.    Procedure:  Right hemicolectomy  Specimen Length (if applicable):  <CR-*Specimen Length (if applicable):     12.0 cm>  Tumor Site:  Cecum  Tumor Location  Tumor Size:  Greatest dimension:  4.5 cm  Macroscopic Tumor Perforation:  Not identified  Macroscopic Intactness of Mesorectum:  Histologic Type:  Adenocarcinoma, focal mucinous (colloid) carcinoma  component.  Histologic Grade:  Low grade (well differentiated to moderately  differentiated)  Histologic Features Suggestive of Microsatellite Instability:  Intratumoral Lymphocytic Response (tumor-infiltrating lymphocytes):  Peritumor Lymphocytic Response (Crohn-like response):  Tumor Subtype and Differentiation:  Microscopic Tumor Extension:  Tumor invades through the muscularis  propria into the subserosal adipose tissue or the nonperitonealized  pericolic or perirectal soft tissues but does not extend to the serosal  surface  Margins    TNM Descriptors:  Primary Tumor (pT):  pT3:  Tumor invades through the muscularis propria  into pericolorectal tissues  Regional Lymph Nodes (pN):  pN0:  No regional lymph node metastasis  Distant Metastasis:  Not applicable.      Objective:     Vitals:  Blood pressure (!) 146/90, pulse 74, temperature 98.5 °F (36.9 °C), resp. rate 18, height 5' 8" (1.727 m), weight 101.1 kg " (222 lb 14.4 oz).    Physical Examination:   GEN: no apparent distress, comfortable; AAOx3  HEAD: atraumatic and normocephalic  EYES: no pallor, no icterus, PERRLA  ENT: OMM, no pharyngeal erythema, external ears WNL; no nasal discharge; no thrush  NECK: no masses, thyroid normal, trachea midline, no LAD/LN's, supple  CV: RRR with no murmur; normal pulse; normal S1 and S2; no pedal edema  CHEST: Normal respiratory effort; CTAB; normal breath sounds; no wheeze or crackles  ABDOM: nontender and nondistended; soft; normal bowel sounds; no rebound/guarding  MUSC/Skeletal: ROM normal; no crepitus; joints normal; no deformities or arthropathy  EXTREM: no clubbing, cyanosis, inflammation or swelling  SKIN: no rashes, lesions, ulcers, petechiae or subcutaneous nodules  : no escamilla  NEURO: grossly intact; motor/sensory WNL; AAOx3; no tremors  PSYCH: normal mood, affect and behavior  LYMPH: normal cervical, supraclavicular, axillary and groin LN's            Labs:     3/9/2018  Lab Results   Component Value Date    WBC 5.3 04/11/2018    HGB 13.6 (L) 04/11/2018    HCT 41.5 04/11/2018    MCV 83.0 04/11/2018     04/11/2018     BMP  Lab Results   Component Value Date     04/11/2018    K 3.0 (LL) 04/11/2018    CL 99 04/11/2018    CO2 28.2 04/11/2018    BUN 21 (H) 04/11/2018    CREATININE 1.54 (H) 04/11/2018    CALCIUM 9.8 04/11/2018     Lab Results   Component Value Date    AST 21 04/11/2018    ALKPHOS 76 04/11/2018    BILITOT 1.0 04/11/2018     CEA was 36.7      Radiology/Diagnostic Studies:    PET/CT: 4/11/2018    IMPRESSION:    1. Persistent mild wall thickening centered about site of ileocolonic  anastomosis with associated FDG uptake is most likely postoperative in nature  and due to residual inflammation. Residual malignancy cannot be excluded on the  basis of imaging alone, and correlation with surgical resection margins is  requested.    2. No current convincing evidence of metastatic disease.    3. 3 mm  nodular density which is vague in superior right middle lobe is  nonspecific. CT thorax without IV contrast follow-up is recommended within 3-6  months to simply document stability.          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 Comparison: None Findings: Cardiac silhouette size is normal. Lungs are clear without effusion. No pneumothorax. No acute osseous abnormality. IMPRESSION: No acute pulmonary process. Read and electronically signed by: Shubham Marino MD on 2/28/2018 6:22 PM CST SHUBHAM MARINO MD    Ct Abdomen Pelvis With Contrast    Result Date: 2/23/2018  CMS MANDATED QUALITY DATA - CT RADIATION ? 436 All CT scans at this facility utilize dose modulation, iterative reconstruction, and/or weight based dosing when appropriate to reduce radiation dose to as low as reasonably achievable. CLINICAL HISTORY: 52 years (1965) Male K63.9 TECHNIQUE: MDI ABDOMEN AND PELVIS W  CONTRAST CT. 296 images obtained. Axial CT images of the abdomen and pelvis were obtained from the dome of the diaphragm to the proximal thigh. CONTRAST: 100 mL of IV Omni 350 was administered uneventfully by IV. Oral contrast was also administered COMPARISON: None available. FINDINGS: Lower Thorax: The lung bases are clear. The heart is normal in size and there is no pericardial effusion. CT Abdomen: Liver: The liver is normal size and imaging appearance. Gallbladder: The gallbladder is unremarkable without acute cholecystitis. Biliary Tree: There is no intra or extrahepatic duct dilation. Spleen: The spleen is normal. Pancreas: The pancreas is normal. Adrenal Glands: The adrenal glands appear within normal limits. Kidneys: The kidneys are normal in imaging appearance without hydronephrosis or hydroureter. Vasculature: The aorta is normal in course and caliber. Lymph nodes: No abdominal lymphadenopathy is seen by size  criteria. Intraperitoneal structures: There is no ascites. Bowel:  There is an apple core like lesion in the ascending colon extending over a length of approximately 5 cm (10 o'clock-8 o'clock position involving a majority of the circumference) the soft tissue mass measures approximately 2 cm in thickness. There are numerous small rounded lymph nodes in the cecal mesentery, none of which are enlarged by size criteria, however given the morphology and location these may be involved, for example a 15 x 7 mm node (image 121). Abdominal wall: The abdominal wall and musculature are normal. Musculoskeletal: There is a transitional lumbosacral vertebral body (S1) with a hypoplastic disc at S1-S2. There is moderate to severe disc height loss at L4-L5. No aggressive appearing lytic or blastic lesion is identified. CT Pelvis: Bladder: The urinary bladder is within normal limits. Reproductive Organs: The prostate and seminal vesicles are within normal limits. Pelvic Lymph nodes: No pelvic lymphadenopathy or pelvic mass is identified. IMPRESSION: 1. Apple-core like lesion in the descending colon most consistent with a primary colonic adenocarcinoma, there is no evidence of obstruction, pneumatosis intra-abdominal free air or abscess. 2. No lymphadenopathy by size criteria and no finding to specifically suggest metastatic disease in the abdomen or pelvis. Read and electronically signed by: Singh Pineda MD on 2/23/2018 9:53 AM CST SINGH PINEDA MD      I have reviewed all available lab results and radiology reports.    Assessment/Plan:   (1) 52 y.o. male with diagnosis of right colon mass found on recent colonoscopy on 2/16/2018 with Dr Johnson.  - he had been seen by Dr Gutierrez with [facility]   - s/p right colectomy with Dr Gutierrez on 3/7/2018  - 27 LN's were all negative  - T3 N0 and Stage IIA  - low grade adenocarcinoma    - PET scan on 4/11/2018 with no definitive evidence of ant metastatic process  - repeat CEA was  "2.1  - will most likely not need chemotherapy if he remains a Stage II  - MMR was negative but all of all JESSICA was "stable" which has been shown to illicit a poorer prognosis (discussed with the patient in detail)     (2) HTN and hypercholesterolemia     (3) Prior mini-CVA - HTN related; no subsequent deficits; sounds like he had a TIA    (4) RML lung nodule on PEt at 3mmm with no FDG uptake - will need repeat Chest CT in 3 months    PLAN:  1. Repeat Chest CT in 3 months  2. F/u with GI, PCP, [facility] etc  3. F/u with Dr Gutierrez and Dr Johnson, and PCP  4. RTC in 6 weeks  Fax note to Alex, Jose Hoang Jr, MD, and Matt    Discussion:       I spent over 25 mins of time with the patient. Reviewed results of the recently ordered labs, tests and studies; made directives with regards to the results. Over half of this time was spent couseling and coordinating care.    I have explained all of the above in detail and the patient understands all of the current recommendation(s). I have answered all of their questions to the best of my ability and to their complete satisfaction.   The patient is to continue with the current management plan.            Electronically signed by Carlo Welch MD        "

## 2018-04-26 ENCOUNTER — TELEPHONE (OUTPATIENT)
Dept: INTERNAL MEDICINE | Facility: CLINIC | Age: 53
End: 2018-04-26

## 2018-04-26 ENCOUNTER — OFFICE VISIT (OUTPATIENT)
Dept: INTERNAL MEDICINE | Facility: CLINIC | Age: 53
End: 2018-04-26
Payer: COMMERCIAL

## 2018-04-26 VITALS
DIASTOLIC BLOOD PRESSURE: 76 MMHG | HEART RATE: 70 BPM | HEIGHT: 68 IN | SYSTOLIC BLOOD PRESSURE: 120 MMHG | WEIGHT: 223 LBS | BODY MASS INDEX: 33.8 KG/M2 | TEMPERATURE: 98 F | OXYGEN SATURATION: 98 %

## 2018-04-26 DIAGNOSIS — E87.6 HYPOKALEMIA: Primary | ICD-10-CM

## 2018-04-26 DIAGNOSIS — Z98.890 S/P LAPAROTOMY: ICD-10-CM

## 2018-04-26 LAB
BUN SERPL-MCNC: 16 MG/DL (ref 8–20)
CALCIUM SERPL-MCNC: 9.7 MG/DL (ref 7.7–10.4)
CHLORIDE: 101 MMOL/L (ref 98–110)
CO2 SERPL-SCNC: 30.6 MMOL/L (ref 22.8–31.6)
CREATININE: 1.33 MG/DL (ref 0.6–1.4)
GLUCOSE: 99 MG/DL (ref 70–99)
POTASSIUM SERPL-SCNC: 3.1 MMOL/L (ref 3.5–5)
SODIUM: 142 MMOL/L (ref 134–144)

## 2018-04-26 PROCEDURE — 99213 OFFICE O/P EST LOW 20 MIN: CPT | Mod: ,,, | Performed by: INTERNAL MEDICINE

## 2018-04-26 RX ORDER — POTASSIUM CHLORIDE 20 MEQ/1
20 TABLET, EXTENDED RELEASE ORAL 2 TIMES DAILY
Qty: 60 TABLET | Refills: 3 | Status: SHIPPED | OUTPATIENT
Start: 2018-04-26 | End: 2018-05-11 | Stop reason: SDUPTHER

## 2018-04-26 RX ORDER — ATORVASTATIN CALCIUM 20 MG/1
TABLET, FILM COATED ORAL
COMMUNITY
Start: 2018-04-18 | End: 2019-06-28 | Stop reason: SDUPTHER

## 2018-04-26 RX ORDER — POTASSIUM CHLORIDE 20 MEQ/1
TABLET, EXTENDED RELEASE ORAL
Start: 2018-04-26 | End: 2018-04-26 | Stop reason: SDUPTHER

## 2018-04-26 NOTE — PROGRESS NOTES
Subjective:       Patient ID: Vince Kwon is a 52 y.o. male.    Chief Complaint: Establish Care (continuity of care (potassium is staying low))    Here for follow up.  His potassium continues to run low despite supplementation.  Asking about clearance to return to work.  The surgeon told him he didn't need to see him back after removing the staples but didn't address how long he should stay out of work.  He still has some periumbilical soreness although it is gradually improving.  He does a lot of lifting at work; does have a brace/belt he can use.  Has been 7 weeks since surgery.      Review of Systems   Constitutional: Negative for chills, diaphoresis, fatigue, fever and unexpected weight change.   HENT: Negative for congestion, ear discharge, ear pain, hearing loss, postnasal drip, rhinorrhea, trouble swallowing and voice change.    Eyes: Positive for itching. Negative for photophobia, pain, discharge, redness and visual disturbance.   Respiratory: Negative for apnea, cough, choking, chest tightness, shortness of breath and wheezing.    Cardiovascular: Negative for chest pain, palpitations and leg swelling.   Gastrointestinal: Positive for abdominal pain. Negative for blood in stool, constipation, diarrhea, nausea, rectal pain and vomiting.   Endocrine: Negative for cold intolerance, heat intolerance, polydipsia and polyuria.   Genitourinary: Negative for decreased urine volume, difficulty urinating, discharge, dysuria, flank pain, frequency, genital sores, hematuria, penile pain, penile swelling, scrotal swelling, testicular pain and urgency.   Musculoskeletal: Negative for arthralgias, back pain, gait problem, joint swelling, myalgias, neck pain and neck stiffness.   Skin: Negative for color change, rash and wound.   Allergic/Immunologic: Negative for environmental allergies and food allergies.   Neurological: Negative for dizziness, tremors, seizures, syncope, facial asymmetry, speech difficulty, weakness,  light-headedness, numbness and headaches.   Hematological: Negative for adenopathy. Does not bruise/bleed easily.   Psychiatric/Behavioral: Negative for confusion, hallucinations, sleep disturbance and suicidal ideas. The patient is not nervous/anxious.        Past Medical History:   Diagnosis Date    Colon tumor     Hyperlipidemia     Hypertension     Hypokalemia 3/1/2018      Past Surgical History:   Procedure Laterality Date    COLON SURGERY  03/07/2018    FINGER SURGERY      RIGHT COLECTOMY Right 03/07/2018           Family History   Problem Relation Age of Onset    Diabetes Mother     Heart disease Mother     Diabetes Sister     Breast cancer Sister     Diabetes Maternal Aunt     Diabetes Maternal Grandmother     Heart disease Maternal Grandmother     Heart disease Maternal Grandfather        Social History     Social History    Marital status: Single     Spouse name: N/A    Number of children: N/A    Years of education: N/A     Occupational History    shipping and recieving      Social History Main Topics    Smoking status: Former Smoker    Smokeless tobacco: Never Used    Alcohol use Yes      Comment: occ    Drug use: No    Sexual activity: Yes     Partners: Female     Other Topics Concern    None     Social History Narrative    Lives alone       Current Outpatient Prescriptions   Medication Sig Dispense Refill    amLODIPine (NORVASC) 5 MG tablet Take 5 mg by mouth.      atorvastatin (LIPITOR) 20 MG tablet Take one tablet daily.      EDARBYCLOR 40-25 mg Tab       labetalol (NORMODYNE) 300 MG tablet Take 300 mg by mouth 2 (two) times daily.      omeprazole (PRILOSEC) 40 MG capsule Take 1 capsule (40 mg total) by mouth once daily. 90 capsule 1    potassium chloride SA (K-DUR,KLOR-CON) 20 MEQ tablet Alternate 2 tablet with 2 tablets by mouth every other day       No current facility-administered medications for this visit.        Review of patient's allergies  "indicates:  No Known Allergies  Objective:    HPI     Establish Care    Additional comments: continuity of care (potassium is staying low)       Last edited by Jose R Willis MA on 4/26/2018 10:07 AM. (History)      Blood pressure 120/76, pulse 70, temperature 97.9 °F (36.6 °C), temperature source Temporal, height 5' 8" (1.727 m), weight 101.2 kg (223 lb), SpO2 98 %. Body mass index is 33.91 kg/m².   Physical Exam   Constitutional: He appears well-developed. No distress.   HENT:   Nose: Nose normal.   Mouth/Throat: Oropharynx is clear and moist.   Eyes: Conjunctivae are normal. Right eye exhibits no discharge. Left eye exhibits no discharge. No scleral icterus.   Neck: Carotid bruit is not present.   Cardiovascular: Normal rate, regular rhythm and normal heart sounds.    No murmur heard.  Pulmonary/Chest: Effort normal and breath sounds normal. No respiratory distress. He has no decreased breath sounds. He has no wheezes. He has no rhonchi. He has no rales.   Abdominal: Soft. He exhibits no distension. There is tenderness (has some mild discomfort and thickening of the scar ) in the periumbilical area. There is no rebound and no guarding.   Musculoskeletal: He exhibits no edema.   Neurological: He is alert. He displays no tremor.   Skin: Skin is warm and dry.   Psychiatric: He has a normal mood and affect. His speech is normal.   Nursing note and vitals reviewed.          Assessment:       1. Hypokalemia    2. S/P laparotomy        Plan:       Vince was seen today for establish care.    Diagnoses and all orders for this visit:    Hypokalemia  Comments:  Has been alternating 1 & 2 tablets QOD since last labs. Recheck BMP and adjust from there    Orders:  -     Basic metabolic panel; Future  -     Basic metabolic panel    S/P laparotomy  Comments:  I think he just has some soreness due to scar tissue.  Next week will be 8 weeks post op so should be able to RTW the following week.  Advised to use brace/belt for " support when lifting. Will check with Dr Gutierrez to make sure he agrees.    Other orders  -     potassium chloride SA (K-DUR,KLOR-CON) 20 MEQ tablet; Alternate 2 tablet with 2 tablets by mouth every other day

## 2018-04-26 NOTE — TELEPHONE ENCOUNTER
----- Message from Jose Hoang Jr., MD sent at 4/26/2018 12:42 PM CDT -----  Please call the patient regarding his abnormal result.  Go ahead and increase potassium to two pills daily and recheck labs in a week

## 2018-04-26 NOTE — Clinical Note
He is asking about return to work (he does a lot of lifting). He still has some periumbilical soreness that is gradually improving.  He will be 8 weeks post op next week.  Is he okay to return to work May 7th or do you need to see him first?

## 2018-05-11 ENCOUNTER — TELEPHONE (OUTPATIENT)
Dept: INTERNAL MEDICINE | Facility: CLINIC | Age: 53
End: 2018-05-11

## 2018-05-11 DIAGNOSIS — E87.6 HYPOKALEMIA: Primary | ICD-10-CM

## 2018-05-11 LAB
BUN SERPL-MCNC: 16 MG/DL (ref 8–20)
CALCIUM SERPL-MCNC: 9.4 MG/DL (ref 7.7–10.4)
CHLORIDE: 101 MMOL/L (ref 98–110)
CO2 SERPL-SCNC: 31.3 MMOL/L (ref 22.8–31.6)
CREATININE: 1.29 MG/DL (ref 0.6–1.4)
GLUCOSE: 101 MG/DL (ref 70–99)
POTASSIUM SERPL-SCNC: 2.9 MMOL/L (ref 3.5–5)
SODIUM: 141 MMOL/L (ref 134–144)

## 2018-05-11 RX ORDER — POTASSIUM CHLORIDE 20 MEQ/1
20 TABLET, EXTENDED RELEASE ORAL 3 TIMES DAILY
Qty: 90 TABLET | Refills: 3 | Status: SHIPPED | OUTPATIENT
Start: 2018-05-11 | End: 2018-08-10 | Stop reason: SDUPTHER

## 2018-05-11 NOTE — TELEPHONE ENCOUNTER
----- Message from Jose Hoang Jr., MD sent at 5/11/2018 12:12 PM CDT -----  Please call the patient regarding his abnormal result.  Confirm that he has been taking meds BID.  If he has, then have him go to three times daily and recheck in a couple of weeks

## 2018-05-25 ENCOUNTER — TELEPHONE (OUTPATIENT)
Dept: INTERNAL MEDICINE | Facility: CLINIC | Age: 53
End: 2018-05-25

## 2018-05-25 LAB
BUN SERPL-MCNC: 14 MG/DL (ref 8–20)
CALCIUM SERPL-MCNC: 9.5 MG/DL (ref 7.7–10.4)
CHLORIDE: 100 MMOL/L (ref 98–110)
CO2 SERPL-SCNC: 30.5 MMOL/L (ref 22.8–31.6)
CREATININE: 1.2 MG/DL (ref 0.6–1.4)
GLUCOSE: 102 MG/DL (ref 70–99)
POTASSIUM SERPL-SCNC: 3.2 MMOL/L (ref 3.5–5)
SODIUM: 139 MMOL/L (ref 134–144)

## 2018-05-25 NOTE — TELEPHONE ENCOUNTER
----- Message from Jose Hoang Jr., MD sent at 5/25/2018 12:16 PM CDT -----  Please call the patient regarding his abnormal result. Potassium still on the low side but no longer in a critical range.  I'm a little nervous about going up any further on the dose so we will just continue to monitor.

## 2018-05-31 ENCOUNTER — OFFICE VISIT (OUTPATIENT)
Dept: HEMATOLOGY/ONCOLOGY | Facility: CLINIC | Age: 53
End: 2018-05-31
Payer: COMMERCIAL

## 2018-05-31 VITALS
HEART RATE: 76 BPM | DIASTOLIC BLOOD PRESSURE: 93 MMHG | TEMPERATURE: 99 F | HEIGHT: 69 IN | BODY MASS INDEX: 33.72 KG/M2 | WEIGHT: 227.69 LBS | SYSTOLIC BLOOD PRESSURE: 148 MMHG

## 2018-05-31 DIAGNOSIS — C18.2 PRIMARY ADENOCARCINOMA OF ASCENDING COLON: Primary | ICD-10-CM

## 2018-05-31 DIAGNOSIS — R91.1 LUNG NODULE: ICD-10-CM

## 2018-05-31 PROCEDURE — 3008F BODY MASS INDEX DOCD: CPT | Mod: ,,, | Performed by: INTERNAL MEDICINE

## 2018-05-31 PROCEDURE — 99214 OFFICE O/P EST MOD 30 MIN: CPT | Mod: ,,, | Performed by: INTERNAL MEDICINE

## 2018-05-31 NOTE — LETTER
May 31, 2018      Jose Hoang Jr., MD  [address]  [address]  [place] [address]           [facility]  [address]  [address]  [place] [address]  Phone: [phone]  Fax: [phone]          Patient: Vince Kwon   MR Number: [id number]   YOB: 1965   Date of Visit: 5/31/2018       Dear Dr. Jose Hoang Jr.:    Thank you for referring Vince Kwon to me for evaluation. Attached you will find relevant portions of my assessment and plan of care.    If you have questions, please do not hesitate to call me. I look forward to following Vince Kwon along with you.    Sincerely,    Carlo Welch MD    Enclosure  CC:  No Recipients    If you would like to receive this communication electronically, please contact [email or web address] or [phone] to request more information on [facility] access.    For providers and/or their staff who would like to refer a patient to [facility], please contact us through our one-stop-shop provider referral line, [facility], at [phone].    If you feel you have received this communication in error or would no longer like to receive these types of communications, please e-mail [email or web address]

## 2018-05-31 NOTE — PROGRESS NOTES
[facility] Hematology/Oncology  PROGRESS NOTE       Subjective:       Patient ID:   NAME: Vince Kwon : 1965     52 y.o. male    Referring Doc: Ugo Johnson  Other Physicians: Natalya Gutierrez    Chief Complaint:  Colon ca f/u    History of Present Illness:     Patient returns today for a regularly scheduled follow-up visit.  The patient is here today to go over the results of the recently ordered labs, tests and studies. He had a  right colectomy with Dr Gutierrez on 3/7/2018. The LN's were all negative per the pathology report. He was a stage II. He is here by himself. He has done well postoperatively and has healed up completely. He has been having some potassium issues which are being addressed by his PCP. .           ROS:   GEN: normal without any fever, night sweats or weight loss  HEENT: normal with no HA's, sore throat, stiff neck, changes in vision  CV: normal with no CP, SOB, PND, LIGHT or orthopnea  PULM: normal with no SOB, cough, hemoptysis, sputum or pleuritic pain  GI: normal with no abdominal pain, nausea, vomiting, constipation, diarrhea, melanotic stools, BRBPR, or hematemesis  : normal with no hematuria, dysuria  BREAST: normal with no mass, discharge, pain  SKIN: normal with no rash, erythema, bruising, or swelling    Allergies:  Review of patient's allergies indicates:  No Known Allergies    Medications:    Current Outpatient Prescriptions:     amLODIPine (NORVASC) 5 MG tablet, Take 5 mg by mouth., Disp: , Rfl:     atorvastatin (LIPITOR) 20 MG tablet, Take one tablet daily., Disp: , Rfl:     EDARBYCLOR 40-25 mg Tab, , Disp: , Rfl:     labetalol (NORMODYNE) 300 MG tablet, Take 300 mg by mouth 2 (two) times daily., Disp: , Rfl:     omeprazole (PRILOSEC) 40 MG capsule, Take 1 capsule (40 mg total) by mouth once daily., Disp: 90 capsule, Rfl: 1    potassium chloride SA (K-DUR,KLOR-CON) 20 MEQ tablet, Take 1 tablet (20 mEq total) by mouth 3 (three) times daily., Disp: 90 tablet, Rfl:  3    PMHx/PSHx Updates:  See patient's last visit with me on 4/16/2018.  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    THE TUMOR PENETRATES THE MUSCULARIS PROPRIA AND INVADES THE  PERICOLONIC FAT.    THE PROXIMAL (ILEAL) AND DISTAL (COLONIC) SURGICAL MARGINS OF  RESECTION ARE FREE OF    MALIGNANCY.    A TOTAL OF TWENTY-SEVEN (27) PERICOLONIC LYMPH NODES ARE FREE OF  METASTATIC TUMOR.    APPENDIX: REACTIVE MUCOSAL LYMPHOID HYPERPLASIA.     NO EVIDENCE OF METASTATIC CARCINOMA.    Procedure:  Right hemicolectomy  Specimen Length (if applicable):  <CR-*Specimen Length (if applicable):     12.0 cm>  Tumor Site:  Cecum  Tumor Location  Tumor Size:  Greatest dimension:  4.5 cm  Macroscopic Tumor Perforation:  Not identified  Macroscopic Intactness of Mesorectum:  Histologic Type:  Adenocarcinoma, focal mucinous (colloid) carcinoma  component.  Histologic Grade:  Low grade (well differentiated to moderately  differentiated)  Histologic Features Suggestive of Microsatellite Instability:  Intratumoral Lymphocytic Response (tumor-infiltrating lymphocytes):  Peritumor Lymphocytic Response (Crohn-like response):  Tumor Subtype and Differentiation:  Microscopic Tumor Extension:  Tumor invades through the muscularis  propria into the subserosal adipose tissue or the nonperitonealized  pericolic or perirectal soft tissues but does not extend to the serosal  surface  Margins    TNM Descriptors:  Primary Tumor (pT):  pT3:  Tumor invades through the muscularis propria  into pericolorectal tissues  Regional Lymph Nodes (pN):  pN0:  No regional lymph node metastasis  Distant Metastasis:  Not applicable.      Objective:     Vitals:  Blood pressure (!) 148/93, pulse 76, temperature 98.5 °F (36.9  "°C), height 5' 8.5" (1.74 m), weight 103.3 kg (227 lb 11.2 oz).    Physical Examination:   GEN: no apparent distress, comfortable; AAOx3  HEAD: atraumatic and normocephalic  EYES: no pallor, no icterus, PERRLA  ENT: OMM, no pharyngeal erythema, external ears WNL; no nasal discharge; no thrush  NECK: no masses, thyroid normal, trachea midline, no LAD/LN's, supple  CV: RRR with no murmur; normal pulse; normal S1 and S2; no pedal edema  CHEST: Normal respiratory effort; CTAB; normal breath sounds; no wheeze or crackles  ABDOM: nontender and nondistended; soft; normal bowel sounds; no rebound/guarding  MUSC/Skeletal: ROM normal; no crepitus; joints normal; no deformities or arthropathy  EXTREM: no clubbing, cyanosis, inflammation or swelling  SKIN: no rashes, lesions, ulcers, petechiae or subcutaneous nodules  : no escamilla  NEURO: grossly intact; motor/sensory WNL; AAOx3; no tremors  PSYCH: normal mood, affect and behavior  LYMPH: normal cervical, supraclavicular, axillary and groin LN's            Labs:     5/25/2018    Lab Results   Component Value Date    WBC 5.3 04/11/2018    HGB 13.6 (L) 04/11/2018    HCT 41.5 04/11/2018    MCV 83.0 04/11/2018     04/11/2018     BMP  Lab Results   Component Value Date     05/25/2018    K 3.2 (L) 05/25/2018     05/25/2018    CO2 30.5 05/25/2018    BUN 14 05/25/2018    CREATININE 1.20 05/25/2018    CALCIUM 9.5 05/25/2018     Lab Results   Component Value Date    AST 21 04/11/2018    ALKPHOS 76 04/11/2018    BILITOT 1.0 04/11/2018     CEA was 2.1 on 4/11/2018      Radiology/Diagnostic Studies:    PET/CT: 4/11/2018    IMPRESSION:    1. Persistent mild wall thickening centered about site of ileocolonic  anastomosis with associated FDG uptake is most likely postoperative in nature  and due to residual inflammation. Residual malignancy cannot be excluded on the  basis of imaging alone, and correlation with surgical resection margins is  requested.    2. No current " convincing evidence of metastatic disease.    3. 3 mm nodular density which is vague in superior right middle lobe is nonspecific. CT thorax without IV contrast follow-up is recommended within 3-6  months to simply document stability.          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 Comparison: None Findings: Cardiac silhouette size is normal. Lungs are clear without effusion. No pneumothorax. No acute osseous abnormality. IMPRESSION: No acute pulmonary process. Read and electronically signed by: Shubham Marino MD on 2/28/2018 6:22 PM CST SHUBHAM MARINO MD    Ct Abdomen Pelvis With Contrast    Result Date: 2/23/2018  CMS MANDATED QUALITY DATA - CT RADIATION ? 436 All CT scans at this facility utilize dose modulation, iterative reconstruction, and/or weight based dosing when appropriate to reduce radiation dose to as low as reasonably achievable. CLINICAL HISTORY: 52 years (1965) Male K63.9 TECHNIQUE: MDI ABDOMEN AND PELVIS W  CONTRAST CT. 296 images obtained. Axial CT images of the abdomen and pelvis were obtained from the dome of the diaphragm to the proximal thigh. CONTRAST: 100 mL of IV Omni 350 was administered uneventfully by IV. Oral contrast was also administered COMPARISON: None available. FINDINGS: Lower Thorax: The lung bases are clear. The heart is normal in size and there is no pericardial effusion. CT Abdomen: Liver: The liver is normal size and imaging appearance. Gallbladder: The gallbladder is unremarkable without acute cholecystitis. Biliary Tree: There is no intra or extrahepatic duct dilation. Spleen: The spleen is normal. Pancreas: The pancreas is normal. Adrenal Glands: The adrenal glands appear within normal limits. Kidneys: The kidneys are normal in imaging appearance without hydronephrosis or hydroureter. Vasculature: The aorta is normal in course and caliber. Lymph  nodes: No abdominal lymphadenopathy is seen by size criteria. Intraperitoneal structures: There is no ascites. Bowel:  There is an apple core like lesion in the ascending colon extending over a length of approximately 5 cm (10 o'clock-8 o'clock position involving a majority of the circumference) the soft tissue mass measures approximately 2 cm in thickness. There are numerous small rounded lymph nodes in the cecal mesentery, none of which are enlarged by size criteria, however given the morphology and location these may be involved, for example a 15 x 7 mm node (image 121). Abdominal wall: The abdominal wall and musculature are normal. Musculoskeletal: There is a transitional lumbosacral vertebral body (S1) with a hypoplastic disc at S1-S2. There is moderate to severe disc height loss at L4-L5. No aggressive appearing lytic or blastic lesion is identified. CT Pelvis: Bladder: The urinary bladder is within normal limits. Reproductive Organs: The prostate and seminal vesicles are within normal limits. Pelvic Lymph nodes: No pelvic lymphadenopathy or pelvic mass is identified. IMPRESSION: 1. Apple-core like lesion in the descending colon most consistent with a primary colonic adenocarcinoma, there is no evidence of obstruction, pneumatosis intra-abdominal free air or abscess. 2. No lymphadenopathy by size criteria and no finding to specifically suggest metastatic disease in the abdomen or pelvis. Read and electronically signed by: Singh Pineda MD on 2/23/2018 9:53 AM CST SINGH PINEDA MD      I have reviewed all available lab results and radiology reports.    Assessment/Plan:   (1) 52 y.o. male with diagnosis of right colon mass found on recent colonoscopy on 2/16/2018 with Dr Johnson.  - he had been seen by Dr Gutierrez with [facility]   - s/p right colectomy with Dr Gutierrez on 3/7/2018  - 27 LN's were all negative  - T3 N0 and Stage IIA  - low grade adenocarcinoma  - repeat CEA was 2.1    - PET scan on  "4/11/2018 with no definitive evidence of ant metastatic process  - repeat CEA was 2.1  - will most likely not need chemotherapy if he remains a Stage II  - MMR was negative but all of all JESSICA was "stable" which has been shown to illicit a poorer prognosis in general but would not changes the current plan of therapy (discussed with the patient in detail)     (2) HTN and hypercholesterolemia     (3) Prior mini-CVA - HTN related; no subsequent deficits; sounds like he had a TIA    (4) RML lung nodule on PEt at 3mmm with no FDG uptake - will need repeat Chest CT 3 months from latest PET    PLAN:  1. Repeat Chest CT in July 2018  2. F/u with GI, PCP, Gen [facility] etc  3. F/u with Dr Gutierrez and Dr Johnson, and PCP  4. Refer to pulmonary for evaluation  4. RTC in 2 months  Fax note to Alex, Jose Hoang Jr, MD, and Matt    Discussion:       I spent over 25 mins of time with the patient. Reviewed results of the recently ordered labs, tests and studies; made directives with regards to the results. Over half of this time was spent couseling and coordinating care.    I have explained all of the above in detail and the patient understands all of the current recommendation(s). I have answered all of their questions to the best of my ability and to their complete satisfaction.   The patient is to continue with the current management plan.            Electronically signed by Carlo Welch MD        "

## 2018-07-27 ENCOUNTER — TELEPHONE (OUTPATIENT)
Dept: HEMATOLOGY/ONCOLOGY | Facility: CLINIC | Age: 53
End: 2018-07-27

## 2018-07-27 NOTE — TELEPHONE ENCOUNTER
Called left message that CT Scan was overall stable            ----- Message from Carlo Welch MD sent at 7/27/2018 12:33 PM CDT -----  Call him with the overall stable report

## 2018-07-31 ENCOUNTER — OFFICE VISIT (OUTPATIENT)
Dept: HEMATOLOGY/ONCOLOGY | Facility: CLINIC | Age: 53
End: 2018-07-31
Payer: COMMERCIAL

## 2018-07-31 ENCOUNTER — TELEPHONE (OUTPATIENT)
Dept: HEMATOLOGY/ONCOLOGY | Facility: CLINIC | Age: 53
End: 2018-07-31

## 2018-07-31 VITALS
HEIGHT: 68 IN | SYSTOLIC BLOOD PRESSURE: 150 MMHG | WEIGHT: 232.69 LBS | TEMPERATURE: 98 F | HEART RATE: 74 BPM | BODY MASS INDEX: 35.27 KG/M2 | DIASTOLIC BLOOD PRESSURE: 90 MMHG

## 2018-07-31 DIAGNOSIS — C18.2 PRIMARY ADENOCARCINOMA OF ASCENDING COLON: Primary | ICD-10-CM

## 2018-07-31 LAB
ALBUMIN SERPL-MCNC: 4 G/DL (ref 3.1–4.7)
ALP SERPL-CCNC: 60 IU/L (ref 40–104)
ALT (SGPT): 22 IU/L (ref 3–33)
AST SERPL-CCNC: 27 IU/L (ref 10–40)
BASOPHILS NFR BLD: 0 K/UL (ref 0–0.2)
BASOPHILS NFR BLD: 0.2 %
BILIRUB SERPL-MCNC: 1.2 MG/DL (ref 0.3–1)
BUN SERPL-MCNC: 20 MG/DL (ref 8–20)
CALCIUM SERPL-MCNC: 9.4 MG/DL (ref 7.7–10.4)
CEA: 1.6 NG/ML
CHLORIDE: 96 MMOL/L (ref 98–110)
CO2 SERPL-SCNC: 29.3 MMOL/L (ref 22.8–31.6)
CREATININE: 1.3 MG/DL (ref 0.6–1.4)
EOSINOPHIL NFR BLD: 0.2 K/UL (ref 0–0.7)
EOSINOPHIL NFR BLD: 3.9 %
ERYTHROCYTE [DISTWIDTH] IN BLOOD BY AUTOMATED COUNT: 12.9 % (ref 11.7–14.9)
GLUCOSE: 105 MG/DL (ref 70–99)
GRAN #: 2.6 K/UL (ref 1.4–6.5)
GRAN%: 52.1 %
HCT VFR BLD AUTO: 45 % (ref 39–55)
HGB BLD-MCNC: 14.9 G/DL (ref 14–16)
IMMATURE GRANS (ABS): 0 K/UL (ref 0–1)
IMMATURE GRANULOCYTES: 0.2 %
LYMPH #: 1.7 K/UL (ref 1.2–3.4)
LYMPH%: 34.3 %
MCH RBC QN AUTO: 27.4 PG (ref 25–35)
MCHC RBC AUTO-ENTMCNC: 33.1 G/DL (ref 31–36)
MCV RBC AUTO: 82.9 FL (ref 80–100)
MONO #: 0.5 K/UL (ref 0.1–0.6)
MONO%: 9.3 %
NUCLEATED RBCS: 0 %
PLATELET # BLD AUTO: 237 K/UL (ref 140–440)
PMV BLD AUTO: 9.8 FL (ref 8.8–12.7)
POTASSIUM SERPL-SCNC: 2.9 MMOL/L (ref 3.5–5)
PROT SERPL-MCNC: 7.4 G/DL (ref 6–8.2)
RBC # BLD AUTO: 5.43 M/UL (ref 4.3–5.9)
SODIUM: 138 MMOL/L (ref 134–144)
WBC # BLD AUTO: 4.9 K/UL (ref 5–10)

## 2018-07-31 PROCEDURE — 99214 OFFICE O/P EST MOD 30 MIN: CPT | Mod: ,,, | Performed by: INTERNAL MEDICINE

## 2018-07-31 PROCEDURE — 3008F BODY MASS INDEX DOCD: CPT | Mod: ,,, | Performed by: INTERNAL MEDICINE

## 2018-07-31 NOTE — PROGRESS NOTES
[facility] Hematology/Oncology  PROGRESS NOTE       Subjective:       Patient ID:   NAME: Vince Kwon : 1965     52 y.o. male    Referring Doc: Ugo Johnson  Other Physicians: Natalya Gutierrez    Chief Complaint:  Colon ca f/u    History of Present Illness:     Patient returns today for a regularly scheduled follow-up visit.  The patient is here by himself. He is doing ok with no new issues. He had recent Ct scans last week. He has not gotten any recent labs. He denies any BRBPR or dark stools. Bowel movements have been normal. He denies any CP, SOB, HA's or N/V        ROS:   GEN: normal without any fever, night sweats or weight loss  HEENT: normal with no HA's, sore throat, stiff neck, changes in vision  CV: normal with no CP, SOB, PND, LIGHT or orthopnea  PULM: normal with no SOB, cough, hemoptysis, sputum or pleuritic pain  GI: normal with no abdominal pain, nausea, vomiting, constipation, diarrhea, melanotic stools, BRBPR, or hematemesis  : normal with no hematuria, dysuria  BREAST: normal with no mass, discharge, pain  SKIN: normal with no rash, erythema, bruising, or swelling    Allergies:  Review of patient's allergies indicates:  No Known Allergies    Medications:    Current Outpatient Prescriptions:     amLODIPine (NORVASC) 5 MG tablet, Take 5 mg by mouth., Disp: , Rfl:     atorvastatin (LIPITOR) 20 MG tablet, Take one tablet daily., Disp: , Rfl:     EDARBYCLOR 40-25 mg Tab, , Disp: , Rfl:     labetalol (NORMODYNE) 300 MG tablet, Take 300 mg by mouth 2 (two) times daily., Disp: , Rfl:     omeprazole (PRILOSEC) 40 MG capsule, Take 1 capsule (40 mg total) by mouth once daily., Disp: 90 capsule, Rfl: 1    potassium chloride SA (K-DUR,KLOR-CON) 20 MEQ tablet, Take 1 tablet (20 mEq total) by mouth 3 (three) times daily., Disp: 90 tablet, Rfl: 3    PMHx/PSHx Updates:  See patient's last visit with me on 2018.  See H&P on 2018        Pathology:    3/7/2018:    FINAL DIAGNOSIS:  RIGHT COLON,  "HEMICOLECTOMY:    ULCERATED MODERATE TO MODERATELY WELL 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    THE TUMOR PENETRATES THE MUSCULARIS PROPRIA AND INVADES THE  PERICOLONIC FAT.    THE PROXIMAL (ILEAL) AND DISTAL (COLONIC) SURGICAL MARGINS OF  RESECTION ARE FREE OF    MALIGNANCY.    A TOTAL OF TWENTY-SEVEN (27) PERICOLONIC LYMPH NODES ARE FREE OF  METASTATIC TUMOR.    APPENDIX: REACTIVE MUCOSAL LYMPHOID HYPERPLASIA.     NO EVIDENCE OF METASTATIC CARCINOMA.    Procedure:  Right hemicolectomy  Specimen Length (if applicable):  <CR-*Specimen Length (if applicable):     12.0 cm>  Tumor Site:  Cecum  Tumor Location  Tumor Size:  Greatest dimension:  4.5 cm  Macroscopic Tumor Perforation:  Not identified  Macroscopic Intactness of Mesorectum:  Histologic Type:  Adenocarcinoma, focal mucinous (colloid) carcinoma  component.  Histologic Grade:  Low grade (well differentiated to moderately  differentiated)  Histologic Features Suggestive of Microsatellite Instability:  Intratumoral Lymphocytic Response (tumor-infiltrating lymphocytes):  Peritumor Lymphocytic Response (Crohn-like response):  Tumor Subtype and Differentiation:  Microscopic Tumor Extension:  Tumor invades through the muscularis  propria into the subserosal adipose tissue or the nonperitonealized  pericolic or perirectal soft tissues but does not extend to the serosal  surface  Margins    TNM Descriptors:  Primary Tumor (pT):  pT3:  Tumor invades through the muscularis propria  into pericolorectal tissues  Regional Lymph Nodes (pN):  pN0:  No regional lymph node metastasis  Distant Metastasis:  Not applicable.      Objective:     Vitals:  Blood pressure (!) 150/90, pulse 74, temperature 98.2 °F (36.8 °C), height 5' 8" (1.727 m), weight 105.6 kg (232 lb 11.2 oz).    Physical Examination:   GEN: no apparent distress, comfortable; AAOx3  HEAD: atraumatic and " normocephalic  EYES: no pallor, no icterus, PERRLA  ENT: OMM, no pharyngeal erythema, external ears WNL; no nasal discharge; no thrush  NECK: no masses, thyroid normal, trachea midline, no LAD/LN's, supple  CV: RRR with no murmur; normal pulse; normal S1 and S2; no pedal edema  CHEST: Normal respiratory effort; CTAB; normal breath sounds; no wheeze or crackles  ABDOM: nontender and nondistended; soft; normal bowel sounds; no rebound/guarding  MUSC/Skeletal: ROM normal; no crepitus; joints normal; no deformities or arthropathy  EXTREM: no clubbing, cyanosis, inflammation or swelling  SKIN: no rashes, lesions, ulcers, petechiae or subcutaneous nodules  : no escamilla  NEURO: grossly intact; motor/sensory WNL; AAOx3; no tremors  PSYCH: normal mood, affect and behavior  LYMPH: normal cervical, supraclavicular, axillary and groin LN's            Labs:     5/25/2018    Lab Results   Component Value Date    WBC 5.3 04/11/2018    HGB 13.6 (L) 04/11/2018    HCT 41.5 04/11/2018    MCV 83.0 04/11/2018     04/11/2018     BMP  Lab Results   Component Value Date     05/25/2018    K 3.2 (L) 05/25/2018     05/25/2018    CO2 30.5 05/25/2018    BUN 14 05/25/2018    CREATININE 1.20 05/25/2018    CALCIUM 9.5 05/25/2018     Lab Results   Component Value Date    AST 21 04/11/2018    ALKPHOS 76 04/11/2018    BILITOT 1.0 04/11/2018     CEA was 2.1 on 4/11/2018      Radiology/Diagnostic Studies:    CT scans 7/27/2018:    IMPRESSION:    1. Stable appearance of subcentimeter right middle lobe and right upper lobe  pulmonary nodules.  2. Status post partial colonic resection with expected postoperative changes,  as discussed above. No residual mass or pathologically enlarged lymph nodes in  the abdomen or pelvis.  3. Incidental findings as above.            PET/CT: 4/11/2018    IMPRESSION:    1. Persistent mild wall thickening centered about site of ileocolonic  anastomosis with associated FDG uptake is most likely  postoperative in nature  and due to residual inflammation. Residual malignancy cannot be excluded on the  basis of imaging alone, and correlation with surgical resection margins is  requested.    2. No current convincing evidence of metastatic disease.    3. 3 mm nodular density which is vague in superior right middle lobe is nonspecific. CT thorax without IV contrast follow-up is recommended within 3-6  months to simply document stability.          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 Comparison: None Findings: Cardiac silhouette size is normal. Lungs are clear without effusion. No pneumothorax. No acute osseous abnormality. IMPRESSION: No acute pulmonary process. Read and electronically signed by: Shubham Marino MD on 2/28/2018 6:22 PM CST SHUBHAM MARINO MD    Ct Abdomen Pelvis With Contrast    Result Date: 2/23/2018  CMS MANDATED QUALITY DATA - CT RADIATION ? 436 All CT scans at this facility utilize dose modulation, iterative reconstruction, and/or weight based dosing when appropriate to reduce radiation dose to as low as reasonably achievable. CLINICAL HISTORY: 52 years (1965) Male K63.9 TECHNIQUE: MDI ABDOMEN AND PELVIS W  CONTRAST CT. 296 images obtained. Axial CT images of the abdomen and pelvis were obtained from the dome of the diaphragm to the proximal thigh. CONTRAST: 100 mL of IV Omni 350 was administered uneventfully by IV. Oral contrast was also administered COMPARISON: None available. FINDINGS: Lower Thorax: The lung bases are clear. The heart is normal in size and there is no pericardial effusion. CT Abdomen: Liver: The liver is normal size and imaging appearance. Gallbladder: The gallbladder is unremarkable without acute cholecystitis. Biliary Tree: There is no intra or extrahepatic duct dilation. Spleen: The spleen is normal. Pancreas: The pancreas is normal. Adrenal  Glands: The adrenal glands appear within normal limits. Kidneys: The kidneys are normal in imaging appearance without hydronephrosis or hydroureter. Vasculature: The aorta is normal in course and caliber. Lymph nodes: No abdominal lymphadenopathy is seen by size criteria. Intraperitoneal structures: There is no ascites. Bowel:  There is an apple core like lesion in the ascending colon extending over a length of approximately 5 cm (10 o'clock-8 o'clock position involving a majority of the circumference) the soft tissue mass measures approximately 2 cm in thickness. There are numerous small rounded lymph nodes in the cecal mesentery, none of which are enlarged by size criteria, however given the morphology and location these may be involved, for example a 15 x 7 mm node (image 121). Abdominal wall: The abdominal wall and musculature are normal. Musculoskeletal: There is a transitional lumbosacral vertebral body (S1) with a hypoplastic disc at S1-S2. There is moderate to severe disc height loss at L4-L5. No aggressive appearing lytic or blastic lesion is identified. CT Pelvis: Bladder: The urinary bladder is within normal limits. Reproductive Organs: The prostate and seminal vesicles are within normal limits. Pelvic Lymph nodes: No pelvic lymphadenopathy or pelvic mass is identified. IMPRESSION: 1. Apple-core like lesion in the descending colon most consistent with a primary colonic adenocarcinoma, there is no evidence of obstruction, pneumatosis intra-abdominal free air or abscess. 2. No lymphadenopathy by size criteria and no finding to specifically suggest metastatic disease in the abdomen or pelvis. Read and electronically signed by: Singh Pineda MD on 2/23/2018 9:53 AM CST SINGH PINEDA MD      I have reviewed all available lab results and radiology reports.    Assessment/Plan:   (1) 52 y.o. male with diagnosis of right colon mass found on recent colonoscopy on 2/16/2018 with Dr Johnson.  - he had been  "seen by Dr Gutierrez with [facility]   - s/p right colectomy with Dr Gutierrez on 3/7/2018  - 27 LN's were all negative  - T3 N0 and Stage IIA  - low grade adenocarcinoma  - PET scan on 4/11/2018 with no definitive evidence of ant metastatic process  - repeat CEA was 2.1  - will most likely not need chemotherapy if he remains a Stage II  - MMR was negative but all of all JESSICA was "stable" which has been shown to illicit a poorer prognosis in general but would not changes the current plan of therapy (discussed with the patient in detail)  - repeat CT scans on 7/27/2018 which appears to be stable     (2) HTN and hypercholesterolemia     (3) Prior mini-CVA - HTN related; no subsequent deficits; sounds like he had a TIA    (4) RML lung nodule on PEt at 3mmm with no FDG uptake; CT stable; he was supposed to see a lung specialist but has not done so    1. Primary adenocarcinoma of ascending colon           PLAN:  1. He needs up to date labs and CEA  2. F/u with GI, PCP, Gen Surg etc  3. F/u with Dr Gutierrez and Dr Johnson, and PCP  4. Refer to pulmonary again for evaluation  4. RTC in 3 months  Fax note to Jose Johnson Jr, MD, [place]    Discussion:       I spent over 25 mins of time with the patient. Reviewed results of the recently ordered labs, tests and studies; made directives with regards to the results. Over half of this time was spent couseling and coordinating care.    I have explained all of the above in detail and the patient understands all of the current recommendation(s). I have answered all of their questions to the best of my ability and to their complete satisfaction.   The patient is to continue with the current management plan.            Electronically signed by Carlo Welch MD        "

## 2018-07-31 NOTE — TELEPHONE ENCOUNTER
Called patient asked if he was taking his potassium supplement he said yes he is taking 20meq 2 times a day  I told him to take 40 meq 2 times a day  For a week he said he would and talk to his other doctor and try to switch meds his BP med makes him urinate

## 2018-07-31 NOTE — LETTER
July 31, 2018      Jose Hoang Jr., MD  [address] [facility] [address]  [address]  [place] [address]           [facility]  [address] [facility] [address]  [address]  [place] [address]  Phone: [phone]  Fax: [phone]          Patient: Vince Kwon   MR Number: [id number]   YOB: 1965   Date of Visit: 7/31/2018       Dear Dr. Jose Hoang Jr.:    Thank you for referring Vince Kwon to me for evaluation. Attached you will find relevant portions of my assessment and plan of care.    If you have questions, please do not hesitate to call me. I look forward to following Vince Kwon along with you.    Sincerely,    Carlo Welch MD    Enclosure  CC:  No Recipients    If you would like to receive this communication electronically, please contact [email or web address] or [phone] to request more information on [employer] Link access.    For providers and/or their staff who would like to refer a patient to [facility], please contact us through our one-stop-shop provider referral line, [facility], at [phone].    If you feel you have received this communication in error or would no longer like to receive these types of communications, please e-mail [email or web address]

## 2018-08-10 ENCOUNTER — OFFICE VISIT (OUTPATIENT)
Dept: INTERNAL MEDICINE | Facility: CLINIC | Age: 53
End: 2018-08-10
Payer: COMMERCIAL

## 2018-08-10 VITALS
WEIGHT: 232 LBS | HEIGHT: 68 IN | HEART RATE: 79 BPM | SYSTOLIC BLOOD PRESSURE: 130 MMHG | OXYGEN SATURATION: 96 % | TEMPERATURE: 98 F | DIASTOLIC BLOOD PRESSURE: 84 MMHG | BODY MASS INDEX: 35.16 KG/M2

## 2018-08-10 DIAGNOSIS — I10 ESSENTIAL HYPERTENSION: Primary | ICD-10-CM

## 2018-08-10 DIAGNOSIS — E78.00 PURE HYPERCHOLESTEROLEMIA: ICD-10-CM

## 2018-08-10 DIAGNOSIS — E87.6 HYPOKALEMIA: ICD-10-CM

## 2018-08-10 DIAGNOSIS — Z11.59 NEED FOR HEPATITIS C SCREENING TEST: ICD-10-CM

## 2018-08-10 DIAGNOSIS — R73.01 IMPAIRED FASTING GLUCOSE: ICD-10-CM

## 2018-08-10 DIAGNOSIS — R91.8 MULTIPLE PULMONARY NODULES: ICD-10-CM

## 2018-08-10 PROBLEM — E78.5 HYPERLIPIDEMIA: Status: ACTIVE | Noted: 2018-08-10

## 2018-08-10 PROCEDURE — 3008F BODY MASS INDEX DOCD: CPT | Mod: ,,, | Performed by: INTERNAL MEDICINE

## 2018-08-10 PROCEDURE — 99213 OFFICE O/P EST LOW 20 MIN: CPT | Mod: ,,, | Performed by: INTERNAL MEDICINE

## 2018-08-10 RX ORDER — POTASSIUM CHLORIDE 20 MEQ/1
20 TABLET, EXTENDED RELEASE ORAL 3 TIMES DAILY
Qty: 90 TABLET | Refills: 3 | Status: SHIPPED | OUTPATIENT
Start: 2018-08-10 | End: 2019-11-22 | Stop reason: SDUPTHER

## 2018-08-10 RX ORDER — AMLODIPINE BESYLATE 10 MG/1
5 TABLET ORAL DAILY
Qty: 90 TABLET | Refills: 1 | Status: SHIPPED | OUTPATIENT
Start: 2018-08-10 | End: 2018-08-17

## 2018-08-10 NOTE — PROGRESS NOTES
Subjective:       Patient ID: Vince Kwon is a 52 y.o. male.    Chief Complaint: Hypertension (continuity of care) and Gastroesophageal Reflux    Hypertension   This is a chronic problem. The problem is controlled. Pertinent negatives include no chest pain, headaches, neck pain, palpitations, peripheral edema or shortness of breath. Past treatments include beta blockers, calcium channel blockers, diuretics and angiotensin blockers. The current treatment provides moderate improvement. There are no compliance problems.      Review of Systems   Constitutional: Negative for chills, fatigue, fever and unexpected weight change.   HENT: Negative for congestion, hearing loss, postnasal drip, rhinorrhea, trouble swallowing and voice change.    Eyes: Negative for photophobia and visual disturbance.   Respiratory: Negative for apnea, cough, choking, chest tightness, shortness of breath and wheezing.    Cardiovascular: Negative for chest pain, palpitations and leg swelling.   Gastrointestinal: Negative for abdominal pain, blood in stool, constipation, diarrhea, nausea, rectal pain and vomiting.   Endocrine: Negative for cold intolerance, heat intolerance, polydipsia and polyuria.   Genitourinary: Negative for decreased urine volume, difficulty urinating, discharge, dysuria, flank pain, frequency, genital sores, hematuria, testicular pain and urgency.   Musculoskeletal: Negative for arthralgias, back pain, gait problem, joint swelling, myalgias and neck pain.   Skin: Negative for color change, rash and wound.   Allergic/Immunologic: Negative for environmental allergies and food allergies.   Neurological: Negative for dizziness, tremors, seizures, syncope, facial asymmetry, speech difficulty, weakness, light-headedness, numbness and headaches.   Hematological: Negative for adenopathy. Does not bruise/bleed easily.   Psychiatric/Behavioral: Negative for confusion, hallucinations, sleep disturbance and suicidal ideas. The patient  is not nervous/anxious.        Past Medical History:   Diagnosis Date    Colon tumor     Hyperlipidemia     Hypertension     Hypokalemia 3/1/2018    Multiple pulmonary nodules 8/10/2018      Past Surgical History:   Procedure Laterality Date    COLON SURGERY  03/07/2018    FINGER SURGERY      RIGHT COLECTOMY Right 03/07/2018           Family History   Problem Relation Age of Onset    Diabetes Mother     Heart disease Mother     Diabetes Sister     Breast cancer Sister     Diabetes Maternal Aunt     Diabetes Maternal Grandmother     Heart disease Maternal Grandmother     Heart disease Maternal Grandfather        Social History     Social History    Marital status: Single     Spouse name: N/A    Number of children: N/A    Years of education: N/A     Occupational History    shipping and recieving      Social History Main Topics    Smoking status: Former Smoker    Smokeless tobacco: Never Used    Alcohol use Yes      Comment: occ    Drug use: No    Sexual activity: Yes     Partners: Female     Other Topics Concern    None     Social History Narrative    Lives alone       Current Outpatient Prescriptions   Medication Sig Dispense Refill    amLODIPine (NORVASC) 10 MG tablet Take 0.5 tablets (5 mg total) by mouth once daily. 90 tablet 1    atorvastatin (LIPITOR) 20 MG tablet Take one tablet daily.      labetalol (NORMODYNE) 300 MG tablet Take 300 mg by mouth 2 (two) times daily.      omeprazole (PRILOSEC) 40 MG capsule Take 1 capsule (40 mg total) by mouth once daily. 90 capsule 1    potassium chloride SA (K-DUR,KLOR-CON) 20 MEQ tablet Take 1 tablet (20 mEq total) by mouth 3 (three) times daily. 90 tablet 3    azilsartan med-chlorthalidone (EDARBYCLOR) 40-12.5 mg Tab Take 1 tablet by mouth once daily. 30 tablet 3     No current facility-administered medications for this visit.        Review of patient's allergies indicates:  No Known Allergies  Objective:    HPI     Hypertension     "Additional comments: continuity of care       Last edited by Jose R Willis MA on 8/10/2018 10:15 AM. (History)      Blood pressure 130/84, pulse 79, temperature 97.9 °F (36.6 °C), temperature source Temporal, height 5' 8" (1.727 m), weight 105.2 kg (232 lb), SpO2 96 %. Body mass index is 35.28 kg/m².   Physical Exam   Constitutional: He appears well-developed. No distress.   HENT:   Nose: Nose normal.   Mouth/Throat: Oropharynx is clear and moist.   Eyes: Conjunctivae are normal. Right eye exhibits no discharge. Left eye exhibits no discharge. No scleral icterus.   Neck: Carotid bruit is not present.   Cardiovascular: Normal rate, regular rhythm and normal heart sounds.    No murmur heard.  Pulmonary/Chest: Effort normal and breath sounds normal. No respiratory distress. He has no decreased breath sounds. He has no wheezes. He has no rhonchi. He has no rales.   Abdominal: Soft. He exhibits no distension. There is no tenderness. There is no rebound and no guarding.   Musculoskeletal: He exhibits no edema.   Neurological: He is alert. He displays no tremor.   Skin: Skin is warm and dry.   Psychiatric: He has a normal mood and affect. His speech is normal.   Nursing note and vitals reviewed.        Office Visit on 07/31/2018   Component Date Value Ref Range Status    WBC 07/31/2018 4.9* 5.0 - 10.0 K/uL Final    RBC 07/31/2018 5.43  4.30 - 5.90 M/uL Final    Hemoglobin 07/31/2018 14.9  14.0 - 16.0 g/dL Final    Hematocrit 07/31/2018 45.0  39.0 - 55.0 % Final    MCV 07/31/2018 82.9  80.0 - 100.0 fL Final    MCH 07/31/2018 27.4  25.0 - 35.0 pg Final    MCHC 07/31/2018 33.1  31.0 - 36.0 g/dL Final    RDW 07/31/2018 12.9  11.7 - 14.9 % Final    Platelets 07/31/2018 237  140 - 440 K/uL Final    MPV 07/31/2018 9.8  8.8 - 12.7 fL Final    Gran% 07/31/2018 52.1  % Final    Lymph% 07/31/2018 34.3  % Final    Mono% 07/31/2018 9.3  % Final    Eosinophil% 07/31/2018 3.9  % Final    Basophil% 07/31/2018 0.2  % Final "    Gran # (ANC) 07/31/2018 2.6  1.4 - 6.5 K/uL Final    Lymph # 07/31/2018 1.7  1.2 - 3.4 K/uL Final    Mono # 07/31/2018 0.5  0.1 - 0.6 K/uL Final    Eos # 07/31/2018 0.2  0.0 - 0.7 K/uL Final    Baso # 07/31/2018 0.0  0.0 - 0.2 K/uL Final    Immature Grans (Abs) 07/31/2018 0.0  0.0 - 1.0 K/uL Final    Immature Granulocytes 07/31/2018 0.2  % Final    nRBC% 07/31/2018 0  % Final    Glucose 07/31/2018 105* 70 - 99 mg/dL Final    BUN, Bld 07/31/2018 20  8 - 20 mg/dL Final    Creatinine 07/31/2018 1.30  0.60 - 1.40 mg/dL Final    Calcium 07/31/2018 9.4  7.7 - 10.4 mg/dL Final    Sodium 07/31/2018 138  134 - 144 mmol/L Final    Potassium 07/31/2018 2.9* 3.5 - 5.0 mmol/L Final    Results repeated    Chloride 07/31/2018 96* 98 - 110 mmol/L Final    CO2 07/31/2018 29.3  22.8 - 31.6 mmol/L Final    Albumin 07/31/2018 4.0  3.1 - 4.7 g/dL Final    Total Bilirubin 07/31/2018 1.2* 0.3 - 1.0 mg/dL Final    Alkaline Phosphatase 07/31/2018 60  40 - 104 IU/L Final    Total Protein 07/31/2018 7.4  6.0 - 8.2 g/dL Final    ALT (SGPT) 07/31/2018 22  3 - 33 IU/L Final    AST 07/31/2018 27  10 - 40 IU/L Final    CEA 07/31/2018 1.6  ng/mL Final    Comment: EXPECTED VALUES                    0.0-3.0   3.1-5.0  5.1-10.0  >10.0              n      ng/mL    ng/mL    ng/mL     ng/mLNON SMOKERS   151     95.4%    3.9%     0.7%      0.0%SMOKERS       150     82.0%    8.7%     8.0%      1.3%TOTAL         301       88.7%    6.3%     4.3%      0.7%     Telephone on 05/11/2018   Component Date Value Ref Range Status    Glucose 05/11/2018 101* 70 - 99 mg/dL Final    BUN, Bld 05/11/2018 16  8 - 20 mg/dL Final    Creatinine 05/11/2018 1.29  0.60 - 1.40 mg/dL Final    Calcium 05/11/2018 9.4  7.7 - 10.4 mg/dL Final    Sodium 05/11/2018 141  134 - 144 mmol/L Final    Potassium 05/11/2018 2.9* 3.5 - 5.0 mmol/L Final    Results repeated    Chloride 05/11/2018 101  98 - 110 mmol/L Final    CO2 05/11/2018 31.3  22.8 - 31.6  mmol/L Final    Glucose 05/25/2018 102* 70 - 99 mg/dL Final    BUN, Bld 05/25/2018 14  8 - 20 mg/dL Final    Creatinine 05/25/2018 1.20  0.60 - 1.40 mg/dL Final    Calcium 05/25/2018 9.5  7.7 - 10.4 mg/dL Final    Sodium 05/25/2018 139  134 - 144 mmol/L Final    Potassium 05/25/2018 3.2* 3.5 - 5.0 mmol/L Final    Chloride 05/25/2018 100  98 - 110 mmol/L Final    CO2 05/25/2018 30.5  22.8 - 31.6 mmol/L Final   ]  Reviewed recent CT; has stable pulmonary nodules  Assessment:       1. Essential hypertension    2. Hypokalemia    3. Pure hypercholesterolemia    4. Multiple pulmonary nodules    5. Impaired fasting glucose    6. Need for hepatitis C screening test        Plan:       Vince was seen today for hypertension and gastroesophageal reflux.    Diagnoses and all orders for this visit:    Essential hypertension  Comments:  Will drop dose on HCTZ and increase amlodipine      Orders:  -     amLODIPine (NORVASC) 10 MG tablet; Take 0.5 tablets (5 mg total) by mouth once daily.  -     azilsartan med-chlorthalidone (EDARBYCLOR) 40-12.5 mg Tab; Take 1 tablet by mouth once daily.    Hypokalemia  Comments:  Continues to have issues with low potassium.  Will see if decreasing HCTZ will help  Orders:  -     potassium chloride SA (K-DUR,KLOR-CON) 20 MEQ tablet; Take 1 tablet (20 mEq total) by mouth 3 (three) times daily.  -     Potassium; Future  -     Potassium    Pure hypercholesterolemia  -     Lipid panel; Future  -     Lipid panel    Multiple pulmonary nodules    Impaired fasting glucose  -     Hemoglobin A1c; Future  -     Hemoglobin A1c    Need for hepatitis C screening test  -     Hepatitis C antibody; Future  -     Hepatitis C antibody

## 2018-08-17 DIAGNOSIS — I10 ESSENTIAL HYPERTENSION: ICD-10-CM

## 2018-08-17 LAB
HBA1C MFR BLD: 5.7 % (ref 3.1–6.5)
POTASSIUM SERPL-SCNC: 3.5 MMOL/L (ref 3.5–5)

## 2018-08-17 RX ORDER — AMLODIPINE BESYLATE 10 MG/1
10 TABLET ORAL DAILY
Start: 2018-08-17 | End: 2018-08-20 | Stop reason: SDUPTHER

## 2018-08-17 NOTE — PROGRESS NOTES
Patient came by asking about instructions on the amlodipine which were half tablet per day.  It was my intention to increase to 10mg per day but I changed the tablet dose but the instructions still had 5mg.  He should be on 10mg; dose updated in medication list

## 2018-08-18 LAB — HCV AB SERPL QL IA: 0.1 S/CO RATIO (ref 0–0.9)

## 2018-08-20 ENCOUNTER — TELEPHONE (OUTPATIENT)
Dept: INTERNAL MEDICINE | Facility: CLINIC | Age: 53
End: 2018-08-20

## 2018-08-20 DIAGNOSIS — I10 ESSENTIAL HYPERTENSION: ICD-10-CM

## 2018-08-20 RX ORDER — AMLODIPINE BESYLATE 10 MG/1
10 TABLET ORAL DAILY
Qty: 90 TABLET | Refills: 1 | Status: SHIPPED | OUTPATIENT
Start: 2018-08-20 | End: 2019-06-03 | Stop reason: SDUPTHER

## 2018-08-20 NOTE — TELEPHONE ENCOUNTER
----- Message from Jose Hoang Jr., MD sent at 8/20/2018  8:28 AM CDT -----  I have reviewed your results.  They demonstrate no abnormal findings.  If you have any additional concerns regarding these tests, please contact me at your convenience.

## 2018-09-12 ENCOUNTER — OFFICE VISIT (OUTPATIENT)
Dept: PULMONOLOGY | Facility: CLINIC | Age: 53
End: 2018-09-12
Payer: COMMERCIAL

## 2018-09-12 VITALS
WEIGHT: 232 LBS | DIASTOLIC BLOOD PRESSURE: 90 MMHG | HEIGHT: 68 IN | BODY MASS INDEX: 35.16 KG/M2 | HEART RATE: 73 BPM | SYSTOLIC BLOOD PRESSURE: 130 MMHG | OXYGEN SATURATION: 96 %

## 2018-09-12 DIAGNOSIS — R91.8 MULTIPLE PULMONARY NODULES: Primary | ICD-10-CM

## 2018-09-12 PROCEDURE — 3008F BODY MASS INDEX DOCD: CPT | Mod: ,,, | Performed by: INTERNAL MEDICINE

## 2018-09-12 PROCEDURE — 99203 OFFICE O/P NEW LOW 30 MIN: CPT | Mod: ,,, | Performed by: INTERNAL MEDICINE

## 2018-09-12 NOTE — LETTER
September 12, 2018      Carlo Welch MD  [address] [facility]  [address]  [place] [address]           [facility] Pulmon[facility]  [address]  [address]  [place] [address]  Phone: [phone]          Patient: Vince Kwon   MR Number: [id number]   YOB: 1965   Date of Visit: 9/12/2018       Dear Dr. Carlo Welch:    Thank you for referring Vince Kwon to me for evaluation. Attached you will find relevant portions of my assessment and plan of care.    If you have questions, please do not hesitate to call me. I look forward to following Vince Kwon along with you.    Sincerely,    Rudi Crockett MD    Enclosure  CC:  No Recipients    If you would like to receive this communication electronically, please contact [email or web address] or [phone] to request more information on [employer] Link access.    For providers and/or their staff who would like to refer a patient to [facility], please contact us through our one-stop-shop provider referral line, [facility], at [phone].    If you feel you have received this communication in error or would no longer like to receive these types of communications, please e-mail [email or web address]

## 2018-09-12 NOTE — PROGRESS NOTES
New Office Visit/Consultation Note    Patient Name: Vince Kwon  MRN: [id number]  : 1965      Reason for visit: Pulmonary nodules    HPI:     2018 - Pt had surgery for colon cancer in 3/2018 - has not needed any chemotherapy (sees Dr Welch), had PET scan showing a few small pulmonary nodules.  Ct scan done 2018 (3 months later) was stable, here for evaluation.  He was a smoker (1 pack per week for 3 years).    Past Medical History    Past Medical History:   Diagnosis Date    Colon tumor     Hyperlipidemia     Hypertension     Hypokalemia 3/1/2018    Multiple pulmonary nodules 8/10/2018       Past Surgical History    Past Surgical History:   Procedure Laterality Date    COLON SURGERY  2018    FINGER SURGERY      RIGHT COLECTOMY Right 2018           Medications      Current Outpatient Medications:     amLODIPine (NORVASC) 10 MG tablet, Take 1 tablet (10 mg total) by mouth once daily., Disp: 90 tablet, Rfl: 1    atorvastatin (LIPITOR) 20 MG tablet, Take one tablet daily., Disp: , Rfl:     azilsartan med-chlorthalidone (EDARBYCLOR) 40-12.5 mg Tab, Take 1 tablet by mouth once daily., Disp: 30 tablet, Rfl: 3    labetalol (NORMODYNE) 300 MG tablet, Take 300 mg by mouth 2 (two) times daily., Disp: , Rfl:     omeprazole (PRILOSEC) 40 MG capsule, Take 1 capsule (40 mg total) by mouth once daily., Disp: 90 capsule, Rfl: 1    potassium chloride SA (K-DUR,KLOR-CON) 20 MEQ tablet, Take 1 tablet (20 mEq total) by mouth 3 (three) times daily., Disp: 90 tablet, Rfl: 3    Allergies    Review of patient's allergies indicates:  No Known Allergies    SocHx    Social History     Tobacco Use   Smoking Status Former Smoker   Smokeless Tobacco Never Used       Social History     Substance and Sexual Activity   Alcohol Use Yes    Comment: occ       Drug Use - no  Occupation - works in warehouse  Asbestos exposure - no  Pets - no    FMHx    Family History   Problem Relation Age of Onset  "   Diabetes Mother     Heart disease Mother     Diabetes Sister     Breast cancer Sister     Diabetes Maternal Aunt     Diabetes Maternal Grandmother     Heart disease Maternal Grandmother     Heart disease Maternal Grandfather          Review of Systems  Review of Systems   Constitutional: Negative for chills, diaphoresis, fever, malaise/fatigue and weight loss.   HENT: Negative for congestion.    Eyes: Negative for pain.   Respiratory: Negative for cough, hemoptysis, sputum production, shortness of breath, wheezing and stridor.    Cardiovascular: Negative for chest pain, palpitations, orthopnea, claudication, leg swelling and PND.   Gastrointestinal: Negative for abdominal pain, constipation, diarrhea, heartburn, nausea and vomiting.   Genitourinary: Negative for dysuria, frequency and urgency.   Musculoskeletal: Negative for falls and myalgias.   Neurological: Negative for sensory change, focal weakness and weakness.   Psychiatric/Behavioral: Negative for depression. The patient is not nervous/anxious.        Physical Exam    Vitals:    09/12/18 0921   BP: (!) 130/90   Pulse: 73   SpO2: 96%   Weight: 105.2 kg (232 lb)   Height: 5' 8" (1.727 m)       Physical Exam   Constitutional: He is oriented to person, place, and time. He appears well-developed and well-nourished. No distress.   overweight   HENT:   Head: Normocephalic and atraumatic.   Right Ear: External ear normal.   Left Ear: External ear normal.   Nose: Nose normal.   Mouth/Throat: Oropharynx is clear and moist.   Eyes: EOM are normal. Pupils are equal, round, and reactive to light.   Neck: Normal range of motion. Neck supple. No JVD present. No tracheal deviation present. No thyromegaly present.   Cardiovascular: Normal rate, regular rhythm, normal heart sounds and intact distal pulses. Exam reveals no gallop and no friction rub.   No murmur heard.  Pulmonary/Chest: Effort normal and breath sounds normal. No stridor. No respiratory distress. He " has no wheezes. He has no rales. He exhibits no tenderness.   Abdominal: Soft. Bowel sounds are normal. He exhibits no distension.   Musculoskeletal: Normal range of motion. He exhibits no edema or tenderness.   Lymphadenopathy:     He has no cervical adenopathy.   Neurological: He is alert and oriented to person, place, and time. He has normal reflexes. No cranial nerve deficit.   Skin: He is not diaphoretic.   Psychiatric: He has a normal mood and affect. His behavior is normal.   Nursing note and vitals reviewed.      Labs    Lab Results   Component Value Date    WBC 4.9 (L) 07/31/2018    HGB 14.9 07/31/2018    HCT 45.0 07/31/2018     07/31/2018       Sodium   Date Value Ref Range Status   07/31/2018 138 134 - 144 mmol/L      Potassium   Date Value Ref Range Status   08/17/2018 3.5 3.5 - 5.0 mmol/L      Chloride   Date Value Ref Range Status   07/31/2018 96 (L) 98 - 110 mmol/L      CO2   Date Value Ref Range Status   07/31/2018 29.3 22.8 - 31.6 mmol/L      Glucose   Date Value Ref Range Status   07/31/2018 105 (H) 70 - 99 mg/dL      BUN, Bld   Date Value Ref Range Status   07/31/2018 20 8 - 20 mg/dL      Creatinine   Date Value Ref Range Status   07/31/2018 1.30 0.60 - 1.40 mg/dL      Calcium   Date Value Ref Range Status   07/31/2018 9.4 7.7 - 10.4 mg/dL      Total Protein   Date Value Ref Range Status   07/31/2018 7.4 6.0 - 8.2 g/dL      Albumin   Date Value Ref Range Status   07/31/2018 4.0 3.1 - 4.7 g/dL      Total Bilirubin   Date Value Ref Range Status   07/31/2018 1.2 (H) 0.3 - 1.0 mg/dL      Alkaline Phosphatase   Date Value Ref Range Status   07/31/2018 60 40 - 104 IU/L      AST   Date Value Ref Range Status   07/31/2018 27 10 - 40 IU/L        Xrays    Comparison: PET/CT 04/11/2018, CT abdomen and pelvis 02/23/2018    Findings:    Bone window images demonstrate minor degenerative changes of the spine, most  pronounced at L3-L4. No acute or aggressive osseous abnormality.    Minor subsegmental  atelectasis is evident bilaterally. 3 mm right middle lobe  pulmonary nodule is stable from prior. There is also a linear subpleural 2 mm  nodular opacity involving the lateral aspect of the right upper lobe which is  stable from prior. No new or enlarging pulmonary nodule. No pleural effusion or  pneumothorax. Central airways are patent. There are no pathologically enlarged  mediastinal, hilar, or axillary lymph nodes. Esophagus is unremarkable. Aorta  is normal in caliber noting minor atherosclerotic calcification. Thyroid gland  appears normal. Minimal bilateral gynecomastia noted.    There is no focal hepatic lesion. The gallbladder and biliary tree are  unremarkable. The spleen, pancreas, adrenal glands, and right kidney are  unremarkable. Subcentimeter left mid pole hypodensity is too small to  characterize, most likely a cyst, and stable compared to the prior CT abdomen  and pelvis. Ureters are normal in caliber. Urinary bladder is unremarkable.  Stomach and small bowel are unremarkable. Patient is status post partial  colonic resection for malignant mass. There is no evidence of enteric leak at  the anastomotic site. Mild stranding is evident about the operative site with  no pathologically enlarged lymph nodes in the abdomen or pelvis. The transverse  and distal colon are unremarkable. No free fluid or free air in the abdomen.  Subcutaneous stranding related to prior surgery noted along the midline  anterior abdominal wall.    IMPRESSION:    1. Stable appearance of subcentimeter right middle lobe and right upper lobe  pulmonary nodules.  2. Status post partial colonic resection with expected postoperative changes,  as discussed above. No residual mass or pathologically enlarged lymph nodes in  the abdomen or pelvis.  3. Incidental findings as above.    Read and electronically signed by: Shubham Marino MD on 7/27/2018 10:59 AM CDT      SHUBHAM MARINO MD    Impression/Plan    Problem List Items Addressed  This Visit        Pulmonary    Multiple pulmonary nodules - Primary  - small and not likely significant  - stable at 3 months  - will repeat CT scan in 1/2019 ( 6 month follow up)  - RTC 1 /2019          I have spent about 30 minutes with the patient taking the history and examining the patient.  We have discussed the diagnoses and current plan and all questions have been answered.  We have discussed the follow up plan.  The patient and family (if present) know to contact the office with any questions they may have.        Rudi Crockett MD

## 2018-10-31 ENCOUNTER — OFFICE VISIT (OUTPATIENT)
Dept: HEMATOLOGY/ONCOLOGY | Facility: CLINIC | Age: 53
End: 2018-10-31
Payer: COMMERCIAL

## 2018-10-31 ENCOUNTER — TELEPHONE (OUTPATIENT)
Dept: HEMATOLOGY/ONCOLOGY | Facility: CLINIC | Age: 53
End: 2018-10-31

## 2018-10-31 ENCOUNTER — TELEPHONE (OUTPATIENT)
Dept: PULMONOLOGY | Facility: CLINIC | Age: 53
End: 2018-10-31

## 2018-10-31 VITALS
RESPIRATION RATE: 20 BRPM | BODY MASS INDEX: 36.19 KG/M2 | SYSTOLIC BLOOD PRESSURE: 131 MMHG | DIASTOLIC BLOOD PRESSURE: 86 MMHG | TEMPERATURE: 99 F | HEART RATE: 72 BPM | WEIGHT: 238 LBS

## 2018-10-31 DIAGNOSIS — C18.2 PRIMARY ADENOCARCINOMA OF ASCENDING COLON: Primary | ICD-10-CM

## 2018-10-31 DIAGNOSIS — R91.8 MULTIPLE PULMONARY NODULES: ICD-10-CM

## 2018-10-31 LAB
ALBUMIN SERPL-MCNC: 3.8 G/DL (ref 3.1–4.7)
ALP SERPL-CCNC: 64 IU/L (ref 40–104)
ALT (SGPT): 20 IU/L (ref 3–33)
AST SERPL-CCNC: 26 IU/L (ref 10–40)
BASOPHILS NFR BLD: 0 K/UL (ref 0–0.2)
BASOPHILS NFR BLD: 0.4 %
BILIRUB SERPL-MCNC: 1.4 MG/DL (ref 0.3–1)
BUN SERPL-MCNC: 16 MG/DL (ref 8–20)
CALCIUM SERPL-MCNC: 9.2 MG/DL (ref 7.7–10.4)
CEA: 1.4 NG/ML
CHLORIDE: 102 MMOL/L (ref 98–110)
CO2 SERPL-SCNC: 30.4 MMOL/L (ref 22.8–31.6)
CREATININE: 1.18 MG/DL (ref 0.6–1.4)
EOSINOPHIL NFR BLD: 0.2 K/UL (ref 0–0.7)
EOSINOPHIL NFR BLD: 4.4 %
ERYTHROCYTE [DISTWIDTH] IN BLOOD BY AUTOMATED COUNT: 13 % (ref 11.7–14.9)
GLUCOSE: 107 MG/DL (ref 70–99)
GRAN #: 2.7 K/UL (ref 1.4–6.5)
GRAN%: 54 %
HCT VFR BLD AUTO: 46.2 % (ref 39–55)
HGB BLD-MCNC: 14.9 G/DL (ref 14–16)
IMMATURE GRANS (ABS): 0 K/UL (ref 0–1)
IMMATURE GRANULOCYTES: 0.4 %
LYMPH #: 1.6 K/UL (ref 1.2–3.4)
LYMPH%: 32.3 %
MCH RBC QN AUTO: 27.8 PG (ref 25–35)
MCHC RBC AUTO-ENTMCNC: 32.3 G/DL (ref 31–36)
MCV RBC AUTO: 86.2 FL (ref 80–100)
MONO #: 0.4 K/UL (ref 0.1–0.6)
MONO%: 8.5 %
NUCLEATED RBCS: 0 %
PLATELET # BLD AUTO: 203 K/UL (ref 140–440)
PMV BLD AUTO: 10.1 FL (ref 8.8–12.7)
POTASSIUM SERPL-SCNC: 3 MMOL/L (ref 3.5–5)
PROT SERPL-MCNC: 7.1 G/DL (ref 6–8.2)
RBC # BLD AUTO: 5.36 M/UL (ref 4.3–5.9)
SODIUM: 140 MMOL/L (ref 134–144)
WBC # BLD AUTO: 5 K/UL (ref 5–10)

## 2018-10-31 PROCEDURE — 3008F BODY MASS INDEX DOCD: CPT | Mod: ,,, | Performed by: INTERNAL MEDICINE

## 2018-10-31 PROCEDURE — 99214 OFFICE O/P EST MOD 30 MIN: CPT | Mod: ,,, | Performed by: INTERNAL MEDICINE

## 2018-10-31 RX ORDER — ASPIRIN 81 MG/1
81 TABLET ORAL
COMMUNITY

## 2018-10-31 NOTE — PROGRESS NOTES
[facility] Hematology/Oncology  PROGRESS NOTE       Subjective:       Patient ID:   NAME: Vince Kwon : 1965     52 y.o. male    Referring Doc: Ugo Johnson  Other Physicians: Natalya Gutierrez    Chief Complaint:  Colon ca f/u    History of Present Illness:     Patient returns today for a regularly scheduled follow-up visit.  The patient is here by himself. He is doing ok with no new issues. He has not gotten any recent labs. He denies any BRBPR or dark stools. Bowel movements have been normal. He denies any CP, SOB, HA's or N/V. He saw Dr Crockett with pulmonary on 2018. He has repeat scope planned for 2019.         ROS:   GEN: normal without any fever, night sweats or weight loss  HEENT: normal with no HA's, sore throat, stiff neck, changes in vision  CV: normal with no CP, SOB, PND, LIGHT or orthopnea  PULM: normal with no SOB, cough, hemoptysis, sputum or pleuritic pain  GI: normal with no abdominal pain, nausea, vomiting, constipation, diarrhea, melanotic stools, BRBPR, or hematemesis  : normal with no hematuria, dysuria  BREAST: normal with no mass, discharge, pain  SKIN: normal with no rash, erythema, bruising, or swelling    Allergies:  Review of patient's allergies indicates:  No Known Allergies    Medications:    Current Outpatient Medications:     amLODIPine (NORVASC) 10 MG tablet, Take 1 tablet (10 mg total) by mouth once daily., Disp: 90 tablet, Rfl: 1    atorvastatin (LIPITOR) 20 MG tablet, Take one tablet daily., Disp: , Rfl:     azilsartan med-chlorthalidone (EDARBYCLOR) 40-12.5 mg Tab, Take 1 tablet by mouth once daily., Disp: 30 tablet, Rfl: 3    labetalol (NORMODYNE) 300 MG tablet, Take 300 mg by mouth 2 (two) times daily., Disp: , Rfl:     omeprazole (PRILOSEC) 40 MG capsule, Take 1 capsule (40 mg total) by mouth once daily., Disp: 90 capsule, Rfl: 1    potassium chloride SA (K-DUR,KLOR-CON) 20 MEQ tablet, Take 1 tablet (20 mEq total) by mouth 3 (three) times daily., Disp: 90  tablet, Rfl: 3    aspirin (ECOTRIN) 81 MG EC tablet, Take 81 mg by mouth., Disp: , Rfl:     PMHx/PSHx Updates:  See patient's last visit with me on 7/31/2018.  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    THE TUMOR PENETRATES THE MUSCULARIS PROPRIA AND INVADES THE  PERICOLONIC FAT.    THE PROXIMAL (ILEAL) AND DISTAL (COLONIC) SURGICAL MARGINS OF  RESECTION ARE FREE OF    MALIGNANCY.    A TOTAL OF TWENTY-SEVEN (27) PERICOLONIC LYMPH NODES ARE FREE OF  METASTATIC TUMOR.    APPENDIX: REACTIVE MUCOSAL LYMPHOID HYPERPLASIA.     NO EVIDENCE OF METASTATIC CARCINOMA.    Procedure:  Right hemicolectomy  Specimen Length (if applicable):  <CR-*Specimen Length (if applicable):     12.0 cm>  Tumor Site:  Cecum  Tumor Location  Tumor Size:  Greatest dimension:  4.5 cm  Macroscopic Tumor Perforation:  Not identified  Macroscopic Intactness of Mesorectum:  Histologic Type:  Adenocarcinoma, focal mucinous (colloid) carcinoma  component.  Histologic Grade:  Low grade (well differentiated to moderately  differentiated)  Histologic Features Suggestive of Microsatellite Instability:  Intratumoral Lymphocytic Response (tumor-infiltrating lymphocytes):  Peritumor Lymphocytic Response (Crohn-like response):  Tumor Subtype and Differentiation:  Microscopic Tumor Extension:  Tumor invades through the muscularis  propria into the subserosal adipose tissue or the nonperitonealized  pericolic or perirectal soft tissues but does not extend to the serosal  surface  Margins    TNM Descriptors:  Primary Tumor (pT):  pT3:  Tumor invades through the muscularis propria  into pericolorectal tissues  Regional Lymph Nodes (pN):  pN0:  No regional lymph node metastasis  Distant Metastasis:  Not  applicable.      Objective:     Vitals:  Blood pressure 131/86, pulse 72, temperature 99 °F (37.2 °C), resp. rate 20, weight 108 kg (238 lb).    Physical Examination:   GEN: no apparent distress, comfortable; AAOx3  HEAD: atraumatic and normocephalic  EYES: no pallor, no icterus, PERRLA  ENT: OMM, no pharyngeal erythema, external ears WNL; no nasal discharge; no thrush  NECK: no masses, thyroid normal, trachea midline, no LAD/LN's, supple  CV: RRR with no murmur; normal pulse; normal S1 and S2; no pedal edema  CHEST: Normal respiratory effort; CTAB; normal breath sounds; no wheeze or crackles  ABDOM: nontender and nondistended; soft; normal bowel sounds; no rebound/guarding  MUSC/Skeletal: ROM normal; no crepitus; joints normal; no deformities or arthropathy  EXTREM: no clubbing, cyanosis, inflammation or swelling  SKIN: no rashes, lesions, ulcers, petechiae or subcutaneous nodules  : no escamilla  NEURO: grossly intact; motor/sensory WNL; AAOx3; no tremors  PSYCH: normal mood, affect and behavior  LYMPH: normal cervical, supraclavicular, axillary and groin LN's            Labs:     7/31/2018    Lab Results   Component Value Date    WBC 4.9 (L) 07/31/2018    HGB 14.9 07/31/2018    HCT 45.0 07/31/2018    MCV 82.9 07/31/2018     07/31/2018     BMP  Lab Results   Component Value Date     07/31/2018    K 3.5 08/17/2018    CL 96 (L) 07/31/2018    CO2 29.3 07/31/2018    BUN 20 07/31/2018    CREATININE 1.30 07/31/2018    CALCIUM 9.4 07/31/2018     Lab Results   Component Value Date    AST 27 07/31/2018    ALKPHOS 60 07/31/2018    BILITOT 1.2 (H) 07/31/2018     CEA was 1.6      Radiology/Diagnostic Studies:    CT scans 7/27/2018:    IMPRESSION:    1. Stable appearance of subcentimeter right middle lobe and right upper lobe  pulmonary nodules.  2. Status post partial colonic resection with expected postoperative changes,  as discussed above. No residual mass or pathologically enlarged lymph nodes in  the abdomen  or pelvis.  3. Incidental findings as above.            PET/CT: 4/11/2018    IMPRESSION:    1. Persistent mild wall thickening centered about site of ileocolonic  anastomosis with associated FDG uptake is most likely postoperative in nature  and due to residual inflammation. Residual malignancy cannot be excluded on the  basis of imaging alone, and correlation with surgical resection margins is  requested.    2. No current convincing evidence of metastatic disease.    3. 3 mm nodular density which is vague in superior right middle lobe is nonspecific. CT thorax without IV contrast follow-up is recommended within 3-6  months to simply document stability.          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 Comparison: None Findings: Cardiac silhouette size is normal. Lungs are clear without effusion. No pneumothorax. No acute osseous abnormality. IMPRESSION: No acute pulmonary process. Read and electronically signed by: Shubham Marino MD on 2/28/2018 6:22 PM CST SHUBHAM MARINO MD    Ct Abdomen Pelvis With Contrast    Result Date: 2/23/2018  CMS MANDATED QUALITY DATA - CT RADIATION ? 436 All CT scans at this facility utilize dose modulation, iterative reconstruction, and/or weight based dosing when appropriate to reduce radiation dose to as low as reasonably achievable. CLINICAL HISTORY: 52 years (1965) Male K63.9 TECHNIQUE: MDI ABDOMEN AND PELVIS W  CONTRAST CT. 296 images obtained. Axial CT images of the abdomen and pelvis were obtained from the dome of the diaphragm to the proximal thigh. CONTRAST: 100 mL of IV Omni 350 was administered uneventfully by IV. Oral contrast was also administered COMPARISON: None available. FINDINGS: Lower Thorax: The lung bases are clear. The heart is normal in size and there is no pericardial effusion. CT Abdomen: Liver: The liver is normal size and imaging  appearance. Gallbladder: The gallbladder is unremarkable without acute cholecystitis. Biliary Tree: There is no intra or extrahepatic duct dilation. Spleen: The spleen is normal. Pancreas: The pancreas is normal. Adrenal Glands: The adrenal glands appear within normal limits. Kidneys: The kidneys are normal in imaging appearance without hydronephrosis or hydroureter. Vasculature: The aorta is normal in course and caliber. Lymph nodes: No abdominal lymphadenopathy is seen by size criteria. Intraperitoneal structures: There is no ascites. Bowel:  There is an apple core like lesion in the ascending colon extending over a length of approximately 5 cm (10 o'clock-8 o'clock position involving a majority of the circumference) the soft tissue mass measures approximately 2 cm in thickness. There are numerous small rounded lymph nodes in the cecal mesentery, none of which are enlarged by size criteria, however given the morphology and location these may be involved, for example a 15 x 7 mm node (image 121). Abdominal wall: The abdominal wall and musculature are normal. Musculoskeletal: There is a transitional lumbosacral vertebral body (S1) with a hypoplastic disc at S1-S2. There is moderate to severe disc height loss at L4-L5. No aggressive appearing lytic or blastic lesion is identified. CT Pelvis: Bladder: The urinary bladder is within normal limits. Reproductive Organs: The prostate and seminal vesicles are within normal limits. Pelvic Lymph nodes: No pelvic lymphadenopathy or pelvic mass is identified. IMPRESSION: 1. Apple-core like lesion in the descending colon most consistent with a primary colonic adenocarcinoma, there is no evidence of obstruction, pneumatosis intra-abdominal free air or abscess. 2. No lymphadenopathy by size criteria and no finding to specifically suggest metastatic disease in the abdomen or pelvis. Read and electronically signed by: Singh Pineda MD on 2/23/2018 9:53 AM CST SINGH PINEDA  "MD      I have reviewed all available lab results and radiology reports.    Assessment/Plan:   (1) 52 y.o. male with diagnosis of right colon mass found on recent colonoscopy on 2/16/2018 with Dr Johnson.  - he had been seen by Dr Gutierrez with [facility]   - s/p right colectomy with Dr Gutierrez on 3/7/2018  - 27 LN's were all negative  - T3 N0 and Stage IIA  - low grade adenocarcinoma  - PET scan on 4/11/2018 with no definitive evidence of ant metastatic process  - repeat CEA was 2.1  - will most likely not need chemotherapy if he remains a Stage II  - MMR was negative but all of all JESSICA was "stable" which has been shown to illicit a poorer prognosis in general but would not changes the current plan of therapy (discussed with the patient in detail)  - repeat CT scans on 7/27/2018 which appears to be stable     (2) HTN and hypercholesterolemia     (3) Prior mini-CVA - HTN related; no subsequent deficits; sounds like he had a TIA    (4) RML lung nodule on PEt at 3mmm with no FDG uptake; CT stable;  - he saw Dr Crockett with pulmonary on 9/12/2018 and he ordered repeat scan for Jan 2019    1. Primary adenocarcinoma of ascending colon     2. Multiple pulmonary nodules           PLAN:  1. He needs up to date labs and CEA  2. F/u with GI, PCP, [facility], pulm etc  3. F/u with Dr Gutierrez and Dr Johnson, and PCP - scope in feb 2-019 per patient  4. Repeat scan in jan 2019 per Dr Crockett (he is due for PEt at same time)  4. RTC in 4-6 months  Fax note to Jose Johnson Jr, MD, [facility]    Discussion:       I spent over 25 mins of time with the patient. Reviewed results of the recently ordered labs, tests and studies; made directives with regards to the results. Over half of this time was spent couseling and coordinating care.    I have explained all of the above in detail and the patient understands all of the current recommendation(s). I have answered all of their questions to the best of my ability " and to their complete satisfaction.   The patient is to continue with the current management plan.            Electronically signed by Carlo Welch MD

## 2018-10-31 NOTE — TELEPHONE ENCOUNTER
Stopped by office asking about the scan you wanted before he comes in January. Looks like  ordered a PET today, do you still want CT chest in Jan?

## 2018-10-31 NOTE — TELEPHONE ENCOUNTER
Patient had labs done for us today and his potassium level is low at 3.0. I called the patient and he takes kdur 20meq daily now per Dr. Hoang. I asked him to double up starting today until he sees Dr. Hoang on this Friday. Pt. Voiced understanding..

## 2018-11-01 DIAGNOSIS — E87.6 HYPOKALEMIA: Primary | ICD-10-CM

## 2018-11-01 LAB — MAGNESIUM SERPL-MCNC: 1.8 MG/DL (ref 1.5–2.6)

## 2018-11-09 ENCOUNTER — OFFICE VISIT (OUTPATIENT)
Dept: FAMILY MEDICINE | Facility: CLINIC | Age: 53
End: 2018-11-09
Payer: COMMERCIAL

## 2018-11-09 VITALS
SYSTOLIC BLOOD PRESSURE: 130 MMHG | BODY MASS INDEX: 35.92 KG/M2 | TEMPERATURE: 98 F | WEIGHT: 237 LBS | HEART RATE: 75 BPM | DIASTOLIC BLOOD PRESSURE: 84 MMHG | OXYGEN SATURATION: 95 % | HEIGHT: 68 IN

## 2018-11-09 DIAGNOSIS — I10 ESSENTIAL HYPERTENSION: Primary | ICD-10-CM

## 2018-11-09 DIAGNOSIS — E87.6 HYPOKALEMIA: ICD-10-CM

## 2018-11-09 LAB
BUN SERPL-MCNC: 20 MG/DL (ref 8–20)
CALCIUM SERPL-MCNC: 9.5 MG/DL (ref 7.7–10.4)
CHLORIDE: 102 MMOL/L (ref 98–110)
CO2 SERPL-SCNC: 30.8 MMOL/L (ref 22.8–31.6)
CREATININE: 1.22 MG/DL (ref 0.6–1.4)
GLUCOSE: 98 MG/DL (ref 70–99)
POTASSIUM SERPL-SCNC: 3.9 MMOL/L (ref 3.5–5)
SODIUM: 140 MMOL/L (ref 134–144)

## 2018-11-09 PROCEDURE — 99213 OFFICE O/P EST LOW 20 MIN: CPT | Mod: ,,, | Performed by: INTERNAL MEDICINE

## 2018-11-09 PROCEDURE — 3008F BODY MASS INDEX DOCD: CPT | Mod: ,,, | Performed by: INTERNAL MEDICINE

## 2018-11-09 NOTE — PROGRESS NOTES
Subjective:       Patient ID: Vince Kwon is a 52 y.o. male.    Chief Complaint: Hypertension (continuity of care) and Hyperlipidemia    Hypertension   This is a chronic problem. The problem is controlled. Pertinent negatives include no chest pain, headaches, neck pain, palpitations, peripheral edema or shortness of breath. Past treatments include beta blockers, calcium channel blockers, diuretics and angiotensin blockers. The current treatment provides moderate improvement. There are no compliance problems.      Review of Systems   Constitutional: Negative for chills, fatigue, fever and unexpected weight change.   HENT: Negative for congestion, hearing loss, postnasal drip, rhinorrhea, trouble swallowing and voice change.    Eyes: Negative for photophobia and visual disturbance.   Respiratory: Negative for apnea, cough, choking, chest tightness, shortness of breath and wheezing.    Cardiovascular: Negative for chest pain, palpitations and leg swelling.   Gastrointestinal: Negative for abdominal pain, blood in stool, constipation, diarrhea, nausea, rectal pain and vomiting.   Endocrine: Negative for cold intolerance, heat intolerance, polydipsia and polyuria.   Genitourinary: Negative for decreased urine volume, difficulty urinating, discharge, dysuria, flank pain, frequency, genital sores, hematuria, testicular pain and urgency.   Musculoskeletal: Negative for arthralgias, back pain, gait problem, joint swelling, myalgias and neck pain.   Skin: Negative for color change, rash and wound.   Allergic/Immunologic: Negative for environmental allergies and food allergies.   Neurological: Negative for dizziness, tremors, seizures, syncope, facial asymmetry, speech difficulty, weakness, light-headedness, numbness and headaches.   Hematological: Negative for adenopathy. Does not bruise/bleed easily.   Psychiatric/Behavioral: Negative for confusion, hallucinations, sleep disturbance and suicidal ideas. The patient  is not nervous/anxious.        Past Medical History:   Diagnosis Date    Colon tumor     Hyperlipidemia     Hypertension     Hypokalemia 3/1/2018    Multiple pulmonary nodules 8/10/2018      Past Surgical History:   Procedure Laterality Date    COLON SURGERY  03/07/2018    FINGER SURGERY      RIGHT COLECTOMY Right 03/07/2018           Family History   Problem Relation Age of Onset    Diabetes Mother     Heart disease Mother     Diabetes Sister     Breast cancer Sister     Diabetes Maternal Aunt     Diabetes Maternal Grandmother     Heart disease Maternal Grandmother     Heart disease Maternal Grandfather        Social History     Socioeconomic History    Marital status: Single     Spouse name: None    Number of children: None    Years of education: None    Highest education level: None   Social Needs    Financial resource strain: None    Food insecurity - worry: None    Food insecurity - inability: None    Transportation needs - medical: None    Transportation needs - non-medical: None   Occupational History    Occupation: shipping and recieving   Tobacco Use    Smoking status: Former Smoker    Smokeless tobacco: Never Used   Substance and Sexual Activity    Alcohol use: Yes     Comment: occ    Drug use: No    Sexual activity: Yes     Partners: Female   Other Topics Concern    None   Social History Narrative    Lives alone       Current Outpatient Medications   Medication Sig Dispense Refill    amLODIPine (NORVASC) 10 MG tablet Take 1 tablet (10 mg total) by mouth once daily. 90 tablet 1    aspirin (ECOTRIN) 81 MG EC tablet Take 81 mg by mouth.      atorvastatin (LIPITOR) 20 MG tablet Take one tablet daily.      azilsartan med-chlorthalidone (EDARBYCLOR) 40-12.5 mg Tab Take 1 tablet by mouth once daily. 30 tablet 3    labetalol (NORMODYNE) 300 MG tablet Take 300 mg by mouth 2 (two) times daily.      omeprazole (PRILOSEC) 40 MG capsule Take 1 capsule (40 mg total) by  "mouth once daily. 90 capsule 1    potassium chloride SA (K-DUR,KLOR-CON) 20 MEQ tablet Take 1 tablet (20 mEq total) by mouth 3 (three) times daily. 90 tablet 3     No current facility-administered medications for this visit.        Review of patient's allergies indicates:  No Known Allergies  Objective:    HPI     Hypertension      Additional comments: continuity of care          Last edited by Jose R Willis MA on 11/9/2018 10:13 AM. (History)      Blood pressure 130/84, pulse 75, temperature 97.9 °F (36.6 °C), temperature source Temporal, height 5' 8" (1.727 m), weight 107.5 kg (237 lb), SpO2 95 %. Body mass index is 36.04 kg/m².   Physical Exam   Constitutional: He appears well-developed. No distress.   HENT:   Nose: Nose normal.   Mouth/Throat: Oropharynx is clear and moist.   Eyes: Conjunctivae are normal. Right eye exhibits no discharge. Left eye exhibits no discharge. No scleral icterus.   Neck: Carotid bruit is not present.   Cardiovascular: Normal rate, regular rhythm and normal heart sounds.   No murmur heard.  Pulmonary/Chest: Effort normal and breath sounds normal. No respiratory distress. He has no decreased breath sounds. He has no wheezes. He has no rhonchi. He has no rales.   Abdominal: Soft. He exhibits no distension. There is no tenderness. There is no rebound and no guarding.   Musculoskeletal: He exhibits no edema.   Neurological: He is alert. He displays no tremor.   Skin: Skin is warm and dry.   Psychiatric: He has a normal mood and affect. His speech is normal.   Nursing note and vitals reviewed.        Orders Only on 11/01/2018   Component Date Value Ref Range Status    Magnesium 10/31/2018 1.8  1.5 - 2.6 mg/dL Final   Office Visit on 10/31/2018   Component Date Value Ref Range Status    WBC 10/31/2018 5.0  5.0 - 10.0 K/uL Final    RBC 10/31/2018 5.36  4.30 - 5.90 M/uL Final    Hemoglobin 10/31/2018 14.9  14.0 - 16.0 g/dL Final    Hematocrit 10/31/2018 46.2  39.0 - 55.0 % Final    MCV " 10/31/2018 86.2  80.0 - 100.0 fL Final    MCH 10/31/2018 27.8  25.0 - 35.0 pg Final    MCHC 10/31/2018 32.3  31.0 - 36.0 g/dL Final    RDW 10/31/2018 13.0  11.7 - 14.9 % Final    Platelets 10/31/2018 203  140 - 440 K/uL Final    MPV 10/31/2018 10.1  8.8 - 12.7 fL Final    Gran% 10/31/2018 54.0  % Final    Lymph% 10/31/2018 32.3  % Final    Mono% 10/31/2018 8.5  % Final    Eosinophil% 10/31/2018 4.4  % Final    Basophil% 10/31/2018 0.4  % Final    Gran # (ANC) 10/31/2018 2.7  1.4 - 6.5 K/uL Final    Lymph # 10/31/2018 1.6  1.2 - 3.4 K/uL Final    Mono # 10/31/2018 0.4  0.1 - 0.6 K/uL Final    Eos # 10/31/2018 0.2  0.0 - 0.7 K/uL Final    Baso # 10/31/2018 0.0  0.0 - 0.2 K/uL Final    Immature Grans (Abs) 10/31/2018 0.0  0.0 - 1.0 K/uL Final    Immature Granulocytes 10/31/2018 0.4  % Final    nRBC% 10/31/2018 0  % Final    Glucose 10/31/2018 107* 70 - 99 mg/dL Final    BUN, Bld 10/31/2018 16  8 - 20 mg/dL Final    Creatinine 10/31/2018 1.18  0.60 - 1.40 mg/dL Final    Calcium 10/31/2018 9.2  7.7 - 10.4 mg/dL Final    Sodium 10/31/2018 140  134 - 144 mmol/L Final    Potassium 10/31/2018 3.0* 3.5 - 5.0 mmol/L Final    Results repeated    Chloride 10/31/2018 102  98 - 110 mmol/L Final    CO2 10/31/2018 30.4  22.8 - 31.6 mmol/L Final    Albumin 10/31/2018 3.8  3.1 - 4.7 g/dL Final    Total Bilirubin 10/31/2018 1.4* 0.3 - 1.0 mg/dL Final    Alkaline Phosphatase 10/31/2018 64  40 - 104 IU/L Final    Total Protein 10/31/2018 7.1  6.0 - 8.2 g/dL Final    ALT (SGPT) 10/31/2018 20  3 - 33 IU/L Final    AST 10/31/2018 26  10 - 40 IU/L Final    CEA 10/31/2018 1.4  ng/mL Final    Comment: EXPECTED VALUES                    0.0-3.0   3.1-5.0  5.1-10.0  >10.0              n      ng/mL    ng/mL    ng/mL     ng/mLNON SMOKERS   151     95.4%    3.9%     0.7%      0.0%SMOKERS       150     82.0%    8.7%     8.0%      1.3%TOTAL         301       88.7%    6.3%     4.3%      0.7%     ]  Assessment:       1.  Essential hypertension    2. Hypokalemia        Plan:       Vince was seen today for hypertension and hyperlipidemia.    Diagnoses and all orders for this visit:    Essential hypertension  Comments:  Continue current meds.  Dropping HCTZ may help with potassium but BP would probably go up.    Hypokalemia  Comments:  He has been trying to increase dietary potassium.  Already on 60mEq/day of KDur so I hesitate to increase further    Orders:  -     Basic metabolic panel; Future  -     Basic metabolic panel

## 2018-11-12 ENCOUNTER — TELEPHONE (OUTPATIENT)
Dept: FAMILY MEDICINE | Facility: CLINIC | Age: 53
End: 2018-11-12

## 2018-11-12 NOTE — TELEPHONE ENCOUNTER
----- Message from Jose Hoang Jr., MD sent at 11/12/2018  8:20 AM CST -----  I have reviewed your results.  They demonstrate no abnormal findings. Potassium is back up in the normal range.   If you have any additional concerns regarding these tests, please contact me at your convenience.

## 2019-01-28 ENCOUNTER — TELEPHONE (OUTPATIENT)
Dept: HEMATOLOGY/ONCOLOGY | Facility: CLINIC | Age: 54
End: 2019-01-28

## 2019-01-28 DIAGNOSIS — C18.2 PRIMARY ADENOCARCINOMA OF ASCENDING COLON: Primary | ICD-10-CM

## 2019-02-01 LAB — ISTAT CREATININE: 1.4 MG/DL (ref 0.6–1.4)

## 2019-02-08 ENCOUNTER — OFFICE VISIT (OUTPATIENT)
Dept: FAMILY MEDICINE | Facility: CLINIC | Age: 54
End: 2019-02-08
Payer: COMMERCIAL

## 2019-02-08 VITALS
HEART RATE: 68 BPM | BODY MASS INDEX: 36.83 KG/M2 | SYSTOLIC BLOOD PRESSURE: 152 MMHG | HEIGHT: 68 IN | OXYGEN SATURATION: 96 % | DIASTOLIC BLOOD PRESSURE: 102 MMHG | WEIGHT: 243 LBS

## 2019-02-08 DIAGNOSIS — E87.6 HYPOKALEMIA: ICD-10-CM

## 2019-02-08 DIAGNOSIS — I10 ESSENTIAL HYPERTENSION: Primary | ICD-10-CM

## 2019-02-08 LAB
ALBUMIN SERPL-MCNC: 4.1 G/DL (ref 3.1–4.7)
ALP SERPL-CCNC: 63 IU/L (ref 40–104)
ALT (SGPT): 25 IU/L (ref 3–33)
AST SERPL-CCNC: 28 IU/L (ref 10–40)
BILIRUB SERPL-MCNC: 1.4 MG/DL (ref 0.3–1)
BUN SERPL-MCNC: 21 MG/DL (ref 8–20)
CALCIUM SERPL-MCNC: 9.4 MG/DL (ref 7.7–10.4)
CHLORIDE: 104 MMOL/L (ref 98–110)
CO2 SERPL-SCNC: 27.5 MMOL/L (ref 22.8–31.6)
CREATININE: 1.53 MG/DL (ref 0.6–1.4)
GLUCOSE: 101 MG/DL (ref 70–99)
POTASSIUM SERPL-SCNC: 3.8 MMOL/L (ref 3.5–5)
PROT SERPL-MCNC: 7.4 G/DL (ref 6–8.2)
SODIUM: 143 MMOL/L (ref 134–144)

## 2019-02-08 PROCEDURE — 3080F PR MOST RECENT DIASTOLIC BLOOD PRESSURE >= 90 MM HG: ICD-10-PCS | Mod: ,,, | Performed by: INTERNAL MEDICINE

## 2019-02-08 PROCEDURE — 3080F DIAST BP >= 90 MM HG: CPT | Mod: ,,, | Performed by: INTERNAL MEDICINE

## 2019-02-08 PROCEDURE — 99213 PR OFFICE/OUTPT VISIT, EST, LEVL III, 20-29 MIN: ICD-10-PCS | Mod: ,,, | Performed by: INTERNAL MEDICINE

## 2019-02-08 PROCEDURE — 3008F BODY MASS INDEX DOCD: CPT | Mod: ,,, | Performed by: INTERNAL MEDICINE

## 2019-02-08 PROCEDURE — 3077F SYST BP >= 140 MM HG: CPT | Mod: ,,, | Performed by: INTERNAL MEDICINE

## 2019-02-08 PROCEDURE — 99213 OFFICE O/P EST LOW 20 MIN: CPT | Mod: ,,, | Performed by: INTERNAL MEDICINE

## 2019-02-08 PROCEDURE — 3008F PR BODY MASS INDEX (BMI) DOCUMENTED: ICD-10-PCS | Mod: ,,, | Performed by: INTERNAL MEDICINE

## 2019-02-08 PROCEDURE — 3077F PR MOST RECENT SYSTOLIC BLOOD PRESSURE >= 140 MM HG: ICD-10-PCS | Mod: ,,, | Performed by: INTERNAL MEDICINE

## 2019-02-08 NOTE — PROGRESS NOTES
Subjective:       Patient ID: Vince Kwon is a 53 y.o. male.    Chief Complaint: Hypertension (3m f/u bp and hypokalemia)    Hypertension   This is a chronic problem. The problem is uncontrolled. Pertinent negatives include no chest pain, headaches, neck pain, palpitations, peripheral edema or shortness of breath. Past treatments include beta blockers, calcium channel blockers, diuretics and angiotensin blockers. The current treatment provides moderate improvement. Compliance problems: has been out of Edarbi about 3 days; didn't realize he didn't have refills.      Review of Systems   Constitutional: Negative for chills, fatigue, fever and unexpected weight change.   HENT: Negative for congestion, hearing loss, postnasal drip, rhinorrhea, trouble swallowing and voice change.    Eyes: Negative for photophobia and visual disturbance.   Respiratory: Negative for apnea, cough, choking, chest tightness, shortness of breath and wheezing.    Cardiovascular: Negative for chest pain, palpitations and leg swelling.   Gastrointestinal: Negative for abdominal pain, blood in stool, constipation, diarrhea, nausea, rectal pain and vomiting.   Endocrine: Negative for cold intolerance, heat intolerance, polydipsia and polyuria.   Genitourinary: Negative for decreased urine volume, difficulty urinating, discharge, dysuria, flank pain, frequency, genital sores, hematuria, testicular pain and urgency.   Musculoskeletal: Negative for arthralgias, back pain, gait problem, joint swelling, myalgias and neck pain.   Skin: Negative for color change, rash and wound.   Allergic/Immunologic: Negative for environmental allergies and food allergies.   Neurological: Negative for dizziness, tremors, seizures, syncope, facial asymmetry, speech difficulty, weakness, light-headedness, numbness and headaches.   Hematological: Negative for adenopathy. Does not bruise/bleed easily.   Psychiatric/Behavioral: Negative for confusion,  hallucinations, sleep disturbance and suicidal ideas. The patient is not nervous/anxious.        Past Medical History:   Diagnosis Date    Colon tumor     Hyperlipidemia     Hypertension     Hypokalemia 3/1/2018    Multiple pulmonary nodules 8/10/2018      Past Surgical History:   Procedure Laterality Date    COLON SURGERY  03/07/2018    FINGER SURGERY      RIGHT COLECTOMY Right 03/07/2018           Family History   Problem Relation Age of Onset    Diabetes Mother     Heart disease Mother     Diabetes Sister     Breast cancer Sister     Diabetes Maternal Aunt     Diabetes Maternal Grandmother     Heart disease Maternal Grandmother     Heart disease Maternal Grandfather        Social History     Socioeconomic History    Marital status: Single     Spouse name: None    Number of children: None    Years of education: None    Highest education level: None   Social Needs    Financial resource strain: None    Food insecurity - worry: None    Food insecurity - inability: None    Transportation needs - medical: None    Transportation needs - non-medical: None   Occupational History    Occupation: shipping and recieving   Tobacco Use    Smoking status: Former Smoker    Smokeless tobacco: Never Used   Substance and Sexual Activity    Alcohol use: Yes     Comment: occ    Drug use: No    Sexual activity: Yes     Partners: Female   Other Topics Concern    None   Social History Narrative    Lives alone       Current Outpatient Medications   Medication Sig Dispense Refill    amLODIPine (NORVASC) 10 MG tablet Take 1 tablet (10 mg total) by mouth once daily. 90 tablet 1    aspirin (ECOTRIN) 81 MG EC tablet Take 81 mg by mouth.      atorvastatin (LIPITOR) 20 MG tablet Take one tablet daily.      azilsartan med-chlorthalidone (EDARBYCLOR) 40-12.5 mg Tab Take 1 tablet by mouth once daily. 90 tablet 1    labetalol (NORMODYNE) 300 MG tablet Take 300 mg by mouth 2 (two) times daily.       "potassium chloride SA (K-DUR,KLOR-CON) 20 MEQ tablet Take 1 tablet (20 mEq total) by mouth 3 (three) times daily. 90 tablet 3     No current facility-administered medications for this visit.        Review of patient's allergies indicates:  No Known Allergies  Objective:    HPI     Hypertension      Additional comments: 3m f/u bp and hypokalemia          Last edited by Ladonna Foster LPN on 2/8/2019  9:09 AM. (History)      Blood pressure (!) 152/102, pulse 68, height 5' 8" (1.727 m), weight 110.2 kg (243 lb), SpO2 96 %. Body mass index is 36.95 kg/m².   Physical Exam   Constitutional: He appears well-developed. No distress.   Obese     HENT:   Nose: Nose normal.   Mouth/Throat: Oropharynx is clear and moist.   Eyes: Conjunctivae are normal. Right eye exhibits no discharge. Left eye exhibits no discharge. No scleral icterus.   Neck: Carotid bruit is not present.   Cardiovascular: Normal rate, regular rhythm and normal heart sounds.   No murmur heard.  Pulmonary/Chest: Effort normal and breath sounds normal. No respiratory distress. He has no decreased breath sounds. He has no wheezes. He has no rhonchi. He has no rales.   Abdominal: Soft. He exhibits no distension. There is no tenderness. There is no rebound and no guarding.   Musculoskeletal: He exhibits no edema.   Neurological: He is alert. He displays no tremor.   Skin: Skin is warm and dry.   Psychiatric: He has a normal mood and affect. His speech is normal.   Nursing note and vitals reviewed.          Assessment:       1. Essential hypertension    2. Hypokalemia        Plan:       Vince was seen today for hypertension.    Diagnoses and all orders for this visit:    Essential hypertension  -     azilsartan med-chlorthalidone (EDARBYCLOR) 40-12.5 mg Tab; Take 1 tablet by mouth once daily.  -     Comprehensive metabolic panel; Future  -     Comprehensive metabolic panel    Hypokalemia  -     Comprehensive metabolic panel; Future  -     Comprehensive metabolic " panel

## 2019-02-15 ENCOUNTER — TELEPHONE (OUTPATIENT)
Dept: HEMATOLOGY/ONCOLOGY | Facility: CLINIC | Age: 54
End: 2019-02-15

## 2019-02-15 NOTE — TELEPHONE ENCOUNTER
Could not leave message 2 nd try to get patient     ----- Message from Carlo Welch MD sent at 2/3/2019  6:28 PM CST -----  He needs a PET/CT scan and alos make sure he has a recent CEA

## 2019-02-18 NOTE — PROGRESS NOTES
"[facility] Hematology/Oncology  PROGRESS NOTE       Subjective:       Patient ID:   NAME: Vince Kwon : 1965     53 y.o. male    Referring Doc: Ugo Johnson  Other Physicians: Natalya Gutierrez    Chief Complaint:  Colon ca f/u    History of Present Illness:     Patient returns today for a regularly scheduled follow-up visit.  The patient is here by himself. He is doing ok with no new issues.  Bowel movements have been normal. He denies any CP, SOB, HA's or N/V. He saw Dr Crockett with pulmonary on 2018 and was supposed to get a repeat scope planned for 2019 but he cancelled that appointment. He saw Dr Hoang on 2018. His insurance would not allow him to get a PET scan so he had CT scans instead. The patient has not been answering his phone and we have been unable to get in touch with him.he has been feeling "good".         ROS:   GEN: normal without any fever, night sweats or weight loss  HEENT: normal with no HA's, sore throat, stiff neck, changes in vision  CV: normal with no CP, SOB, PND, LIGHT or orthopnea  PULM: normal with no SOB, cough, hemoptysis, sputum or pleuritic pain  GI: normal with no abdominal pain, nausea, vomiting, constipation, diarrhea, melanotic stools, BRBPR, or hematemesis  : normal with no hematuria, dysuria  BREAST: normal with no mass, discharge, pain  SKIN: normal with no rash, erythema, bruising, or swelling    Allergies:  Review of patient's allergies indicates:  No Known Allergies    Medications:    Current Outpatient Medications:     amLODIPine (NORVASC) 10 MG tablet, Take 1 tablet (10 mg total) by mouth once daily., Disp: 90 tablet, Rfl: 1    aspirin (ECOTRIN) 81 MG EC tablet, Take 81 mg by mouth., Disp: , Rfl:     atorvastatin (LIPITOR) 20 MG tablet, Take one tablet daily., Disp: , Rfl:     azilsartan med-chlorthalidone (EDARBYCLOR) 40-12.5 mg Tab, Take 1 tablet by mouth once daily., Disp: 90 tablet, Rfl: 1    labetalol (NORMODYNE) 300 MG tablet, Take " 300 mg by mouth 2 (two) times daily., Disp: , Rfl:     potassium chloride SA (K-DUR,KLOR-CON) 20 MEQ tablet, Take 1 tablet (20 mEq total) by mouth 3 (three) times daily., Disp: 90 tablet, Rfl: 3    PMHx/PSHx Updates:  See patient's last visit with me on 10/31/2018.  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    THE TUMOR PENETRATES THE MUSCULARIS PROPRIA AND INVADES THE  PERICOLONIC FAT.    THE PROXIMAL (ILEAL) AND DISTAL (COLONIC) SURGICAL MARGINS OF  RESECTION ARE FREE OF    MALIGNANCY.    A TOTAL OF TWENTY-SEVEN (27) PERICOLONIC LYMPH NODES ARE FREE OF  METASTATIC TUMOR.    APPENDIX: REACTIVE MUCOSAL LYMPHOID HYPERPLASIA.     NO EVIDENCE OF METASTATIC CARCINOMA.    Procedure:  Right hemicolectomy  Specimen Length (if applicable):  <CR-*Specimen Length (if applicable):     12.0 cm>  Tumor Site:  Cecum  Tumor Location  Tumor Size:  Greatest dimension:  4.5 cm  Macroscopic Tumor Perforation:  Not identified  Macroscopic Intactness of Mesorectum:  Histologic Type:  Adenocarcinoma, focal mucinous (colloid) carcinoma  component.  Histologic Grade:  Low grade (well differentiated to moderately  differentiated)  Histologic Features Suggestive of Microsatellite Instability:  Intratumoral Lymphocytic Response (tumor-infiltrating lymphocytes):  Peritumor Lymphocytic Response (Crohn-like response):  Tumor Subtype and Differentiation:  Microscopic Tumor Extension:  Tumor invades through the muscularis  propria into the subserosal adipose tissue or the nonperitonealized  pericolic or perirectal soft tissues but does not extend to the serosal  surface  Margins    TNM Descriptors:  Primary Tumor (pT):  pT3:  Tumor invades through the muscularis propria  into pericolorectal  tissues  Regional Lymph Nodes (pN):  pN0:  No regional lymph node metastasis  Distant Metastasis:  Not applicable.      Objective:     Vitals:  Blood pressure 136/85, pulse 79, temperature 98.7 °F (37.1 °C), resp. rate 20, weight 108.9 kg (240 lb 1.6 oz).    Physical Examination:   GEN: no apparent distress, comfortable; AAOx3  HEAD: atraumatic and normocephalic  EYES: no pallor, no icterus, PERRLA  ENT: OMM, no pharyngeal erythema, external ears WNL; no nasal discharge; no thrush  NECK: no masses, thyroid normal, trachea midline, no LAD/LN's, supple  CV: RRR with no murmur; normal pulse; normal S1 and S2; no pedal edema  CHEST: Normal respiratory effort; CTAB; normal breath sounds; no wheeze or crackles  ABDOM: nontender and nondistended; soft; normal bowel sounds; no rebound/guarding  MUSC/Skeletal: ROM normal; no crepitus; joints normal; no deformities or arthropathy  EXTREM: no clubbing, cyanosis, inflammation or swelling  SKIN: no rashes, lesions, ulcers, petechiae or subcutaneous nodules  : no escamilla  NEURO: grossly intact; motor/sensory WNL; AAOx3; no tremors  PSYCH: normal mood, affect and behavior  LYMPH: normal cervical, supraclavicular, axillary and groin LN's            Labs:     2/8/2019    Lab Results   Component Value Date    WBC 5.0 10/31/2018    HGB 14.9 10/31/2018    HCT 46.2 10/31/2018    MCV 86.2 10/31/2018     10/31/2018     BMP  Lab Results   Component Value Date     02/08/2019    K 3.8 02/08/2019     02/08/2019    CO2 27.5 02/08/2019    BUN 21 (H) 02/08/2019    CREATININE 1.53 (H) 02/08/2019    CALCIUM 9.4 02/08/2019     Lab Results   Component Value Date    AST 28 02/08/2019    ALKPHOS 63 02/08/2019    BILITOT 1.4 (H) 02/08/2019     CEA was 1.4 on 10/31/2018      Radiology/Diagnostic Studies:    Ct scan 2/1/2019:    IMPRESSION:  1. Interval development of several prominent to borderline enlarged lymph nodes  within the central mesentery and right lower quadrant,  nonspecific. These are  suspicious for metastatic disease, given lymphadenopathy seen on the patient's  original preoperative CT of 02/23/2018. Correlation with serum tumor markers,  and consideration for further evaluation with PET CT, are recommended.  2. No additional evidence for metastatic disease in the abdomen or the pelvis.          CT scans 7/27/2018:    IMPRESSION:    1. Stable appearance of subcentimeter right middle lobe and right upper lobe  pulmonary nodules.  2. Status post partial colonic resection with expected postoperative changes,  as discussed above. No residual mass or pathologically enlarged lymph nodes in  the abdomen or pelvis.  3. Incidental findings as above.            PET/CT: 4/11/2018    IMPRESSION:    1. Persistent mild wall thickening centered about site of ileocolonic  anastomosis with associated FDG uptake is most likely postoperative in nature  and due to residual inflammation. Residual malignancy cannot be excluded on the  basis of imaging alone, and correlation with surgical resection margins is  requested.    2. No current convincing evidence of metastatic disease.    3. 3 mm nodular density which is vague in superior right middle lobe is nonspecific. CT thorax without IV contrast follow-up is recommended within 3-6  months to simply document stability.          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 Comparison: None Findings: Cardiac silhouette size is normal. Lungs are clear without effusion. No pneumothorax. No acute osseous abnormality. IMPRESSION: No acute pulmonary process. Read and electronically signed by: Shubham Marino MD on 2/28/2018 6:22 PM CST SHUBHAM MARINO MD    Ct Abdomen Pelvis With Contrast    Result Date: 2/23/2018  CMS MANDATED QUALITY DATA - CT RADIATION ? 436 All CT scans at this facility utilize dose modulation, iterative  reconstruction, and/or weight based dosing when appropriate to reduce radiation dose to as low as reasonably achievable. CLINICAL HISTORY: 52 years (1965) Male K63.9 TECHNIQUE: MDI ABDOMEN AND PELVIS W  CONTRAST CT. 296 images obtained. Axial CT images of the abdomen and pelvis were obtained from the dome of the diaphragm to the proximal thigh. CONTRAST: 100 mL of IV Omni 350 was administered uneventfully by IV. Oral contrast was also administered COMPARISON: None available. FINDINGS: Lower Thorax: The lung bases are clear. The heart is normal in size and there is no pericardial effusion. CT Abdomen: Liver: The liver is normal size and imaging appearance. Gallbladder: The gallbladder is unremarkable without acute cholecystitis. Biliary Tree: There is no intra or extrahepatic duct dilation. Spleen: The spleen is normal. Pancreas: The pancreas is normal. Adrenal Glands: The adrenal glands appear within normal limits. Kidneys: The kidneys are normal in imaging appearance without hydronephrosis or hydroureter. Vasculature: The aorta is normal in course and caliber. Lymph nodes: No abdominal lymphadenopathy is seen by size criteria. Intraperitoneal structures: There is no ascites. Bowel:  There is an apple core like lesion in the ascending colon extending over a length of approximately 5 cm (10 o'clock-8 o'clock position involving a majority of the circumference) the soft tissue mass measures approximately 2 cm in thickness. There are numerous small rounded lymph nodes in the cecal mesentery, none of which are enlarged by size criteria, however given the morphology and location these may be involved, for example a 15 x 7 mm node (image 121). Abdominal wall: The abdominal wall and musculature are normal. Musculoskeletal: There is a transitional lumbosacral vertebral body (S1) with a hypoplastic disc at S1-S2. There is moderate to severe disc height loss at L4-L5. No aggressive appearing lytic or blastic lesion is  "identified. CT Pelvis: Bladder: The urinary bladder is within normal limits. Reproductive Organs: The prostate and seminal vesicles are within normal limits. Pelvic Lymph nodes: No pelvic lymphadenopathy or pelvic mass is identified. IMPRESSION: 1. Apple-core like lesion in the descending colon most consistent with a primary colonic adenocarcinoma, there is no evidence of obstruction, pneumatosis intra-abdominal free air or abscess. 2. No lymphadenopathy by size criteria and no finding to specifically suggest metastatic disease in the abdomen or pelvis. Read and electronically signed by: Singh Colvin MD on 2/23/2018 9:53 AM CST SINGH COLVIN MD      I have reviewed all available lab results and radiology reports.    Assessment/Plan:   (1) 52 y.o. male with diagnosis of right colon mass found on recent colonoscopy on 2/16/2018 with Dr Johnson.  - he had been seen by Dr Gutierrez with [facility]   - s/p right colectomy with Dr Gutierrez on 3/7/2018  - 27 LN's were all negative  - T3 N0 and Stage IIA  - low grade adenocarcinoma  - PET scan on 4/11/2018 with no definitive evidence of ant metastatic process  - repeat CEA was 2.1  - will most likely not need chemotherapy if he remains a Stage II  - MMR was negative but all of all JESSICA was "stable" which has been shown to illicit a poorer prognosis in general but would not changes the current plan of therapy (discussed with the patient in detail)  - prior CT scans on 7/27/2018 which appeared to be stable; however, repeat CT on 2/1/2019 showed some increase in LN's which need to be addressed with  PET     (2) HTN and hypercholesterolemia     (3) Prior mini-CVA - HTN related; no subsequent deficits; sounds like he had a TIA    (4) RML lung nodule on PEt at 3mm with no FDG uptake; CT stable;  - he saw Dr Crockett with pulmonary on 9/12/2018 and he ordered repeat scan for Jan 2019    1. Multiple pulmonary nodules     2. Primary adenocarcinoma of ascending colon   "         PLAN:  1. He needs to have repeat PET - will schedule  2. F/u with GI, PCP, Gen Surg, pulm etc  3. F/u with Dr Gutierrez and Dr Johnson, and PCP   4. Encouraged compliance with f/u with Pulm  5. He has a colonoscopy planned for end of Feb 2019 as well  6. RTC 3- 4 weeks  Fax note to Jose Johnson Jr, MD, [place]    Discussion:       I spent over 25 mins of time with the patient. Reviewed results of the recently ordered labs, tests and studies; made directives with regards to the results. Over half of this time was spent couseling and coordinating care.    I have explained all of the above in detail and the patient understands all of the current recommendation(s). I have answered all of their questions to the best of my ability and to their complete satisfaction.   The patient is to continue with the current management plan.            Electronically signed by Carlo Welch MD

## 2019-02-19 ENCOUNTER — TELEPHONE (OUTPATIENT)
Dept: PULMONOLOGY | Facility: CLINIC | Age: 54
End: 2019-02-19

## 2019-02-19 ENCOUNTER — OFFICE VISIT (OUTPATIENT)
Dept: HEMATOLOGY/ONCOLOGY | Facility: CLINIC | Age: 54
End: 2019-02-19
Payer: COMMERCIAL

## 2019-02-19 VITALS
HEART RATE: 79 BPM | BODY MASS INDEX: 36.51 KG/M2 | RESPIRATION RATE: 20 BRPM | WEIGHT: 240.13 LBS | DIASTOLIC BLOOD PRESSURE: 85 MMHG | SYSTOLIC BLOOD PRESSURE: 136 MMHG | TEMPERATURE: 99 F

## 2019-02-19 DIAGNOSIS — R91.8 MULTIPLE PULMONARY NODULES: Primary | ICD-10-CM

## 2019-02-19 DIAGNOSIS — R93.5 ABNORMAL CT OF THE ABDOMEN: ICD-10-CM

## 2019-02-19 DIAGNOSIS — R59.0 LAD (LYMPHADENOPATHY), INTRA-ABDOMINAL: ICD-10-CM

## 2019-02-19 DIAGNOSIS — C18.2 PRIMARY ADENOCARCINOMA OF ASCENDING COLON: ICD-10-CM

## 2019-02-19 PROCEDURE — 99214 PR OFFICE/OUTPT VISIT, EST, LEVL IV, 30-39 MIN: ICD-10-PCS | Mod: ,,, | Performed by: INTERNAL MEDICINE

## 2019-02-19 PROCEDURE — 3075F SYST BP GE 130 - 139MM HG: CPT | Mod: ,,, | Performed by: INTERNAL MEDICINE

## 2019-02-19 PROCEDURE — 3008F PR BODY MASS INDEX (BMI) DOCUMENTED: ICD-10-PCS | Mod: ,,, | Performed by: INTERNAL MEDICINE

## 2019-02-19 PROCEDURE — 3079F DIAST BP 80-89 MM HG: CPT | Mod: ,,, | Performed by: INTERNAL MEDICINE

## 2019-02-19 PROCEDURE — 3075F PR MOST RECENT SYSTOLIC BLOOD PRESS GE 130-139MM HG: ICD-10-PCS | Mod: ,,, | Performed by: INTERNAL MEDICINE

## 2019-02-19 PROCEDURE — 3008F BODY MASS INDEX DOCD: CPT | Mod: ,,, | Performed by: INTERNAL MEDICINE

## 2019-02-19 PROCEDURE — 99214 OFFICE O/P EST MOD 30 MIN: CPT | Mod: ,,, | Performed by: INTERNAL MEDICINE

## 2019-02-19 PROCEDURE — 3079F PR MOST RECENT DIASTOLIC BLOOD PRESSURE 80-89 MM HG: ICD-10-PCS | Mod: ,,, | Performed by: INTERNAL MEDICINE

## 2019-02-19 NOTE — TELEPHONE ENCOUNTER
He cancelled his January appt., please see if he would like to reschedule (next available or cancellation)

## 2019-02-19 NOTE — LETTER
February 19, 2019      Jose Hoang Jr., MD  [address]  [place] [address]           [facility] - Hematology Oncology  [address]  [address]  [place] [address]  Phone: [phone]  Fax: [phone]          Patient: Vince Kwon   MR Number: [id number]   YOB: 1965   Date of Visit: 2/19/2019       Dear Dr. Jose Hoang Jr.:    Thank you for referring Vince Kwon to me for evaluation. Attached you will find relevant portions of my assessment and plan of care.    If you have questions, please do not hesitate to call me. I look forward to following Vince Kwon along with you.    Sincerely,    Carlo Welch MD    Enclosure  CC:  No Recipients    If you would like to receive this communication electronically, please contact [email or web address] or [phone] to request more information on [facility] access.    For providers and/or their staff who would like to refer a patient to [facility], please contact us through our one-stop-shop provider referral line, [facility], at [phone].    If you feel you have received this communication in error or would no longer like to receive these types of communications, please e-mail [email or web address]

## 2019-02-19 NOTE — TELEPHONE ENCOUNTER
Informed patient that Dr. Crockett wants to see him after the PET Scan.   PET SCAN is march 8th.   Booked appointment for April 2, 2019.   Told patient if we have any cancellations after the 8th of march, will call to move appointment up

## 2019-03-08 RX ORDER — LABETALOL 300 MG/1
300 TABLET, FILM COATED ORAL 2 TIMES DAILY
Qty: 180 TABLET | Refills: 1 | Status: SHIPPED | OUTPATIENT
Start: 2019-03-08 | End: 2019-06-28 | Stop reason: SDUPTHER

## 2019-03-11 ENCOUNTER — TELEPHONE (OUTPATIENT)
Dept: HEMATOLOGY/ONCOLOGY | Facility: CLINIC | Age: 54
End: 2019-03-11

## 2019-03-11 NOTE — TELEPHONE ENCOUNTER
Patient coming in on the 19th of march to follow up     ----- Message from Carlo Welch MD sent at 3/11/2019  9:27 AM CDT -----  Good report

## 2019-03-15 ENCOUNTER — TELEPHONE (OUTPATIENT)
Dept: HEMATOLOGY/ONCOLOGY | Facility: CLINIC | Age: 54
End: 2019-03-15

## 2019-03-15 DIAGNOSIS — C18.2 PRIMARY ADENOCARCINOMA OF ASCENDING COLON: Primary | ICD-10-CM

## 2019-03-15 LAB
ALBUMIN SERPL-MCNC: 4.3 G/DL (ref 3.1–4.7)
ALP SERPL-CCNC: 64 IU/L (ref 40–104)
ALT (SGPT): 23 IU/L (ref 3–33)
AST SERPL-CCNC: 31 IU/L (ref 10–40)
BASOPHILS NFR BLD: 0 K/UL (ref 0–0.2)
BASOPHILS NFR BLD: 0.5 %
BILIRUB SERPL-MCNC: 1.4 MG/DL (ref 0.3–1)
BUN SERPL-MCNC: 17 MG/DL (ref 8–20)
CALCIUM SERPL-MCNC: 9.1 MG/DL (ref 7.7–10.4)
CEA: 1.5 NG/ML
CHLORIDE: 100 MMOL/L (ref 98–110)
CO2 SERPL-SCNC: 33.3 MMOL/L (ref 22.8–31.6)
CREATININE: 1.24 MG/DL (ref 0.6–1.4)
EOSINOPHIL NFR BLD: 0.2 K/UL (ref 0–0.7)
EOSINOPHIL NFR BLD: 3.5 %
ERYTHROCYTE [DISTWIDTH] IN BLOOD BY AUTOMATED COUNT: 12.8 % (ref 11.7–14.9)
GLUCOSE: 101 MG/DL (ref 70–99)
GRAN #: 2.1 K/UL (ref 1.4–6.5)
GRAN%: 47.7 %
HCT VFR BLD AUTO: 46.4 % (ref 39–55)
HGB BLD-MCNC: 15.2 G/DL (ref 14–16)
IMMATURE GRANS (ABS): 0 K/UL (ref 0–1)
IMMATURE GRANULOCYTES: 0.2 %
LYMPH #: 1.7 K/UL (ref 1.2–3.4)
LYMPH%: 38.8 %
MCH RBC QN AUTO: 27.5 PG (ref 25–35)
MCHC RBC AUTO-ENTMCNC: 32.8 G/DL (ref 31–36)
MCV RBC AUTO: 84.1 FL (ref 80–100)
MONO #: 0.4 K/UL (ref 0.1–0.6)
MONO%: 9.3 %
NUCLEATED RBCS: 0 %
PLATELET # BLD AUTO: 233 K/UL (ref 140–440)
PMV BLD AUTO: 9.5 FL (ref 8.8–12.7)
POTASSIUM SERPL-SCNC: 3.2 MMOL/L (ref 3.5–5)
PROT SERPL-MCNC: 7.5 G/DL (ref 6–8.2)
RBC # BLD AUTO: 5.52 M/UL (ref 4.3–5.9)
SODIUM: 139 MMOL/L (ref 134–144)
WBC # BLD AUTO: 4.3 K/UL (ref 5–10)

## 2019-03-18 NOTE — PROGRESS NOTES
[facility] Hematology/Oncology  PROGRESS NOTE       Subjective:       Patient ID:   NAME: Vince Kwon : 1965     53 y.o. male    Referring Doc: Ugo Johnson  Other Physicians: Natalya Gutierrez    Chief Complaint:  Colon ca f/u    History of Present Illness:     Patient returns today for a regularly scheduled follow-up visit.  The patient is here by himself. He is doing ok with no new issues.  Bowel movements have been normal. He denies any CP, SOB, HA's or N/V. He had recent PET scan and is here to go over the results. He has not had his endoscopy as of yet.         ROS:   GEN: normal without any fever, night sweats or weight loss  HEENT: normal with no HA's, sore throat, stiff neck, changes in vision  CV: normal with no CP, SOB, PND, LIGHT or orthopnea  PULM: normal with no SOB, cough, hemoptysis, sputum or pleuritic pain  GI: normal with no abdominal pain, nausea, vomiting, constipation, diarrhea, melanotic stools, BRBPR, or hematemesis  : normal with no hematuria, dysuria  BREAST: normal with no mass, discharge, pain  SKIN: normal with no rash, erythema, bruising, or swelling    Allergies:  Review of patient's allergies indicates:  No Known Allergies    Medications:    Current Outpatient Medications:     amLODIPine (NORVASC) 10 MG tablet, Take 1 tablet (10 mg total) by mouth once daily., Disp: 90 tablet, Rfl: 1    aspirin (ECOTRIN) 81 MG EC tablet, Take 81 mg by mouth., Disp: , Rfl:     atorvastatin (LIPITOR) 20 MG tablet, Take one tablet daily., Disp: , Rfl:     azilsartan med-chlorthalidone (EDARBYCLOR) 40-12.5 mg Tab, Take 1 tablet by mouth once daily., Disp: 90 tablet, Rfl: 1    labetalol (NORMODYNE) 300 MG tablet, Take 1 tablet (300 mg total) by mouth 2 (two) times daily., Disp: 180 tablet, Rfl: 1    potassium chloride SA (K-DUR,KLOR-CON) 20 MEQ tablet, Take 1 tablet (20 mEq total) by mouth 3 (three) times daily., Disp: 90 tablet, Rfl: 3    PMHx/PSHx Updates:  See patient's last visit with  me on 2/19/2019  See H&P on 2/20/2018        Pathology:    3/7/2018:    FINAL DIAGNOSIS:  RIGHT COLON, HEMICOLECTOMY:    ULCERATED MODERATE TO MODERATELY WELL 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    THE TUMOR PENETRATES THE MUSCULARIS PROPRIA AND INVADES THE  PERICOLONIC FAT.    THE PROXIMAL (ILEAL) AND DISTAL (COLONIC) SURGICAL MARGINS OF  RESECTION ARE FREE OF    MALIGNANCY.    A TOTAL OF TWENTY-SEVEN (27) PERICOLONIC LYMPH NODES ARE FREE OF  METASTATIC TUMOR.    APPENDIX: REACTIVE MUCOSAL LYMPHOID HYPERPLASIA.     NO EVIDENCE OF METASTATIC CARCINOMA.    Procedure:  Right hemicolectomy  Specimen Length (if applicable):  <CR-*Specimen Length (if applicable):     12.0 cm>  Tumor Site:  Cecum  Tumor Location  Tumor Size:  Greatest dimension:  4.5 cm  Macroscopic Tumor Perforation:  Not identified  Macroscopic Intactness of Mesorectum:  Histologic Type:  Adenocarcinoma, focal mucinous (colloid) carcinoma  component.  Histologic Grade:  Low grade (well differentiated to moderately  differentiated)  Histologic Features Suggestive of Microsatellite Instability:  Intratumoral Lymphocytic Response (tumor-infiltrating lymphocytes):  Peritumor Lymphocytic Response (Crohn-like response):  Tumor Subtype and Differentiation:  Microscopic Tumor Extension:  Tumor invades through the muscularis  propria into the subserosal adipose tissue or the nonperitonealized  pericolic or perirectal soft tissues but does not extend to the serosal  surface  Margins    TNM Descriptors:  Primary Tumor (pT):  pT3:  Tumor invades through the muscularis propria  into pericolorectal tissues  Regional Lymph Nodes (pN):  pN0:  No regional lymph node metastasis  Distant Metastasis:  Not applicable.      Objective:     Vitals:  Blood pressure (!) 152/88, pulse 83, temperature 99.1 °F (37.3 °C), resp. rate 20, weight 108.2 kg (238 lb 8  oz).    Physical Examination:   GEN: no apparent distress, comfortable; AAOx3  HEAD: atraumatic and normocephalic  EYES: no pallor, no icterus, PERRLA  ENT: OMM, no pharyngeal erythema, external ears WNL; no nasal discharge; no thrush  NECK: no masses, thyroid normal, trachea midline, no LAD/LN's, supple  CV: RRR with no murmur; normal pulse; normal S1 and S2; no pedal edema  CHEST: Normal respiratory effort; CTAB; normal breath sounds; no wheeze or crackles  ABDOM: nontender and nondistended; soft; normal bowel sounds; no rebound/guarding  MUSC/Skeletal: ROM normal; no crepitus; joints normal; no deformities or arthropathy  EXTREM: no clubbing, cyanosis, inflammation or swelling  SKIN: no rashes, lesions, ulcers, petechiae or subcutaneous nodules  : no escamilla  NEURO: grossly intact; motor/sensory WNL; AAOx3; no tremors  PSYCH: normal mood, affect and behavior  LYMPH: normal cervical, supraclavicular, axillary and groin LN's            Labs:     3/15/2019    Lab Results   Component Value Date    WBC 4.3 (L) 03/15/2019    HGB 15.2 03/15/2019    HCT 46.4 03/15/2019    MCV 84.1 03/15/2019     03/15/2019     BMP  Lab Results   Component Value Date     03/15/2019    K 3.2 (L) 03/15/2019     03/15/2019    CO2 33.3 (H) 03/15/2019    BUN 17 03/15/2019    CREATININE 1.24 03/15/2019    CALCIUM 9.1 03/15/2019     Lab Results   Component Value Date    AST 31 03/15/2019    ALKPHOS 64 03/15/2019    BILITOT 1.4 (H) 03/15/2019     CEA was 1.5 on 3/15/2019    Radiology/Diagnostic Studies:    PET/CT  3/8/2019  IMPRESSION:    No convincing PET/CT evidence of metastatic disease          Ct scan 2/1/2019:    IMPRESSION:  1. Interval development of several prominent to borderline enlarged lymph nodes  within the central mesentery and right lower quadrant, nonspecific. These are suspicious for metastatic disease, given lymphadenopathy seen on the patient's original preoperative CT of 02/23/2018. Correlation with serum  tumor markers, and consideration for further evaluation with PET CT, are recommended.  2. No additional evidence for metastatic disease in the abdomen or the pelvis.          CT scans 7/27/2018:    IMPRESSION:    1. Stable appearance of subcentimeter right middle lobe and right upper lobe  pulmonary nodules.  2. Status post partial colonic resection with expected postoperative changes,  as discussed above. No residual mass or pathologically enlarged lymph nodes in  the abdomen or pelvis.  3. Incidental findings as above.            PET/CT: 4/11/2018    IMPRESSION:    1. Persistent mild wall thickening centered about site of ileocolonic  anastomosis with associated FDG uptake is most likely postoperative in nature  and due to residual inflammation. Residual malignancy cannot be excluded on the  basis of imaging alone, and correlation with surgical resection margins is  requested.    2. No current convincing evidence of metastatic disease.    3. 3 mm nodular density which is vague in superior right middle lobe is nonspecific. CT thorax without IV contrast follow-up is recommended within 3-6  months to simply document stability.          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 Comparison: None Findings: Cardiac silhouette size is normal. Lungs are clear without effusion. No pneumothorax. No acute osseous abnormality. IMPRESSION: No acute pulmonary process. Read and electronically signed by: Shubham Marino MD on 2/28/2018 6:22 PM CST SHUBHAM MARINO MD    Ct Abdomen Pelvis With Contrast    Result Date: 2/23/2018  CMS MANDATED QUALITY DATA - CT RADIATION ? 436 All CT scans at this facility utilize dose modulation, iterative reconstruction, and/or weight based dosing when appropriate to reduce radiation dose to as low as reasonably achievable. CLINICAL HISTORY: 52 years (1965) Male K63.9 TECHNIQUE:  MDI ABDOMEN AND PELVIS W  CONTRAST CT. 296 images obtained. Axial CT images of the abdomen and pelvis were obtained from the dome of the diaphragm to the proximal thigh. CONTRAST: 100 mL of IV Omni 350 was administered uneventfully by IV. Oral contrast was also administered COMPARISON: None available. FINDINGS: Lower Thorax: The lung bases are clear. The heart is normal in size and there is no pericardial effusion. CT Abdomen: Liver: The liver is normal size and imaging appearance. Gallbladder: The gallbladder is unremarkable without acute cholecystitis. Biliary Tree: There is no intra or extrahepatic duct dilation. Spleen: The spleen is normal. Pancreas: The pancreas is normal. Adrenal Glands: The adrenal glands appear within normal limits. Kidneys: The kidneys are normal in imaging appearance without hydronephrosis or hydroureter. Vasculature: The aorta is normal in course and caliber. Lymph nodes: No abdominal lymphadenopathy is seen by size criteria. Intraperitoneal structures: There is no ascites. Bowel:  There is an apple core like lesion in the ascending colon extending over a length of approximately 5 cm (10 o'clock-8 o'clock position involving a majority of the circumference) the soft tissue mass measures approximately 2 cm in thickness. There are numerous small rounded lymph nodes in the cecal mesentery, none of which are enlarged by size criteria, however given the morphology and location these may be involved, for example a 15 x 7 mm node (image 121). Abdominal wall: The abdominal wall and musculature are normal. Musculoskeletal: There is a transitional lumbosacral vertebral body (S1) with a hypoplastic disc at S1-S2. There is moderate to severe disc height loss at L4-L5. No aggressive appearing lytic or blastic lesion is identified. CT Pelvis: Bladder: The urinary bladder is within normal limits. Reproductive Organs: The prostate and seminal vesicles are within normal limits. Pelvic Lymph nodes: No  "pelvic lymphadenopathy or pelvic mass is identified. IMPRESSION: 1. Apple-core like lesion in the descending colon most consistent with a primary colonic adenocarcinoma, there is no evidence of obstruction, pneumatosis intra-abdominal free air or abscess. 2. No lymphadenopathy by size criteria and no finding to specifically suggest metastatic disease in the abdomen or pelvis. Read and electronically signed by: Singh Colvin MD on 2/23/2018 9:53 AM CST SINGH COLVIN MD      I have reviewed all available lab results and radiology reports.    Assessment/Plan:   (1) 52 y.o. male with diagnosis of right colon mass found on recent colonoscopy on 2/16/2018 with Dr Johnson.  - he had been seen by Dr Gutierrez with [facility]   - s/p right colectomy with Dr Gutierrez on 3/7/2018  - 27 LN's were all negative  - T3 N0 and Stage IIA  - low grade adenocarcinoma  - PET scan on 4/11/2018 with no definitive evidence of ant metastatic process  - repeat CEA was 2.1  - will most likely not need chemotherapy if he remains a Stage II  - MMR was negative but all of all JESSICA was "stable" which has been shown to illicit a poorer prognosis in general but would not changes the current plan of therapy (discussed with the patient in detail)  - prior CT scans on 7/27/2018 which appeared to be stable; however, repeat CT on 2/1/2019 showed some increase in LN's which need to be addressed with  PET  - subsequent PET on 3/9/2019 with no convincing PET/CT evidence of metastatic disease     (2) HTN and hypercholesterolemia     (3) Prior mini-CVA - HTN related; no subsequent deficits; sounds like he had a TIA    (4) RML lung nodule on PEt at 3mm with no FDG uptake; CT stable;  - he saw Dr Crockett with pulmonary on 9/12/2018 and he ordered repeat scan for Jan 2019    1. Primary adenocarcinoma of ascending colon     2. Multiple pulmonary nodules           PLAN:  1. recommend that he f/u with GI; he needs repeat scope  2. F/u with GI, PCP, [facility], " pulm etc  3. F/u with Dr Gutierrez and Dr Johnson, and PCP   4. Encouraged compliance with f/u with Pulm  5. Repeat CT in 6 months  6. RTC 6 months  Fax note to Jose Johnson Jr, MD, [place]    Discussion:       I spent over 25 mins of time with the patient. Reviewed results of the recently ordered labs, tests and studies; made directives with regards to the results. Over half of this time was spent couseling and coordinating care.    I have explained all of the above in detail and the patient understands all of the current recommendation(s). I have answered all of their questions to the best of my ability and to their complete satisfaction.   The patient is to continue with the current management plan.            Electronically signed by Carlo Welch MD

## 2019-03-19 ENCOUNTER — OFFICE VISIT (OUTPATIENT)
Dept: HEMATOLOGY/ONCOLOGY | Facility: CLINIC | Age: 54
End: 2019-03-19
Payer: COMMERCIAL

## 2019-03-19 VITALS
WEIGHT: 238.5 LBS | TEMPERATURE: 99 F | HEART RATE: 83 BPM | RESPIRATION RATE: 20 BRPM | BODY MASS INDEX: 36.26 KG/M2 | SYSTOLIC BLOOD PRESSURE: 152 MMHG | DIASTOLIC BLOOD PRESSURE: 88 MMHG

## 2019-03-19 DIAGNOSIS — C18.2 PRIMARY ADENOCARCINOMA OF ASCENDING COLON: Primary | ICD-10-CM

## 2019-03-19 DIAGNOSIS — R91.8 MULTIPLE PULMONARY NODULES: ICD-10-CM

## 2019-03-19 PROCEDURE — 3077F SYST BP >= 140 MM HG: CPT | Mod: ,,, | Performed by: INTERNAL MEDICINE

## 2019-03-19 PROCEDURE — 3008F PR BODY MASS INDEX (BMI) DOCUMENTED: ICD-10-PCS | Mod: ,,, | Performed by: INTERNAL MEDICINE

## 2019-03-19 PROCEDURE — 3077F PR MOST RECENT SYSTOLIC BLOOD PRESSURE >= 140 MM HG: ICD-10-PCS | Mod: ,,, | Performed by: INTERNAL MEDICINE

## 2019-03-19 PROCEDURE — 3079F DIAST BP 80-89 MM HG: CPT | Mod: ,,, | Performed by: INTERNAL MEDICINE

## 2019-03-19 PROCEDURE — 3008F BODY MASS INDEX DOCD: CPT | Mod: ,,, | Performed by: INTERNAL MEDICINE

## 2019-03-19 PROCEDURE — 99214 OFFICE O/P EST MOD 30 MIN: CPT | Mod: ,,, | Performed by: INTERNAL MEDICINE

## 2019-03-19 PROCEDURE — 3079F PR MOST RECENT DIASTOLIC BLOOD PRESSURE 80-89 MM HG: ICD-10-PCS | Mod: ,,, | Performed by: INTERNAL MEDICINE

## 2019-03-19 PROCEDURE — 99214 PR OFFICE/OUTPT VISIT, EST, LEVL IV, 30-39 MIN: ICD-10-PCS | Mod: ,,, | Performed by: INTERNAL MEDICINE

## 2019-03-19 NOTE — LETTER
March 19, 2019      Jose Hoang Jr., MD  [address]  [place] [address]           [facility] - Hematology Oncology  [address] [facility]  [address]  [place] [address]  Phone: [phone]  Fax: [phone]          Patient: Vince Kwon   MR Number: [id number]   YOB: 1965   Date of Visit: 3/19/2019       Dear Dr. Jose Hoang Jr.:    Thank you for referring Vince Kwon to me for evaluation. Attached you will find relevant portions of my assessment and plan of care.    If you have questions, please do not hesitate to call me. I look forward to following Vince Kwon along with you.    Sincerely,    Carlo Welch MD    Enclosure  CC:  No Recipients    If you would like to receive this communication electronically, please contact [email or web address] or [phone] to request more information on [facility] Link access.    For providers and/or their staff who would like to refer a patient to [facility], please contact us through our one-stop-shop provider referral line, [facility], at [phone].    If you feel you have received this communication in error or would no longer like to receive these types of communications, please e-mail [email or web address]

## 2019-03-22 ENCOUNTER — OFFICE VISIT (OUTPATIENT)
Dept: FAMILY MEDICINE | Facility: CLINIC | Age: 54
End: 2019-03-22
Payer: COMMERCIAL

## 2019-03-22 VITALS
OXYGEN SATURATION: 96 % | SYSTOLIC BLOOD PRESSURE: 126 MMHG | HEIGHT: 68 IN | TEMPERATURE: 98 F | HEART RATE: 66 BPM | BODY MASS INDEX: 35.77 KG/M2 | WEIGHT: 236 LBS | DIASTOLIC BLOOD PRESSURE: 76 MMHG

## 2019-03-22 DIAGNOSIS — I10 ESSENTIAL HYPERTENSION: Primary | ICD-10-CM

## 2019-03-22 PROCEDURE — 99212 PR OFFICE/OUTPT VISIT, EST, LEVL II, 10-19 MIN: ICD-10-PCS | Mod: ,,, | Performed by: INTERNAL MEDICINE

## 2019-03-22 PROCEDURE — 3008F PR BODY MASS INDEX (BMI) DOCUMENTED: ICD-10-PCS | Mod: ,,, | Performed by: INTERNAL MEDICINE

## 2019-03-22 PROCEDURE — 3008F BODY MASS INDEX DOCD: CPT | Mod: ,,, | Performed by: INTERNAL MEDICINE

## 2019-03-22 PROCEDURE — 3078F PR MOST RECENT DIASTOLIC BLOOD PRESSURE < 80 MM HG: ICD-10-PCS | Mod: ,,, | Performed by: INTERNAL MEDICINE

## 2019-03-22 PROCEDURE — 3074F SYST BP LT 130 MM HG: CPT | Mod: ,,, | Performed by: INTERNAL MEDICINE

## 2019-03-22 PROCEDURE — 3078F DIAST BP <80 MM HG: CPT | Mod: ,,, | Performed by: INTERNAL MEDICINE

## 2019-03-22 PROCEDURE — 3074F PR MOST RECENT SYSTOLIC BLOOD PRESSURE < 130 MM HG: ICD-10-PCS | Mod: ,,, | Performed by: INTERNAL MEDICINE

## 2019-03-22 PROCEDURE — 99212 OFFICE O/P EST SF 10 MIN: CPT | Mod: ,,, | Performed by: INTERNAL MEDICINE

## 2019-03-22 RX ORDER — OMEPRAZOLE 20 MG/1
20 CAPSULE, DELAYED RELEASE ORAL DAILY
COMMUNITY
End: 2020-10-02 | Stop reason: SDUPTHER

## 2019-03-22 NOTE — PROGRESS NOTES
Subjective:       Patient ID: Vince Kwon is a 53 y.o. male.    Chief Complaint: Hypertension (continuity of care) and Hyperlipidemia    Here for follow up on blood pressure.  He had been out of his meds several days at his last visit.  Has been focusing more on cardio at the gym.      Review of Systems   Constitutional: Negative for chills, fatigue, fever and unexpected weight change.   HENT: Negative for congestion, hearing loss, postnasal drip, rhinorrhea, trouble swallowing and voice change.    Eyes: Negative for photophobia and visual disturbance.   Respiratory: Negative for apnea, cough, choking, chest tightness, shortness of breath and wheezing.    Cardiovascular: Negative for chest pain, palpitations and leg swelling.   Gastrointestinal: Negative for abdominal pain, blood in stool, constipation, diarrhea, nausea, rectal pain and vomiting.   Endocrine: Negative for cold intolerance, heat intolerance, polydipsia and polyuria.   Genitourinary: Negative for decreased urine volume, difficulty urinating, discharge, dysuria, flank pain, frequency, genital sores, hematuria, testicular pain and urgency.   Musculoskeletal: Negative for arthralgias, back pain, gait problem, joint swelling, myalgias and neck pain.   Skin: Negative for color change, rash and wound.   Allergic/Immunologic: Negative for environmental allergies and food allergies.   Neurological: Negative for dizziness, tremors, seizures, syncope, facial asymmetry, speech difficulty, weakness, light-headedness, numbness and headaches.   Hematological: Negative for adenopathy. Does not bruise/bleed easily.   Psychiatric/Behavioral: Negative for confusion, hallucinations, sleep disturbance and suicidal ideas. The patient is not nervous/anxious.        Past Medical History:   Diagnosis Date    Colon tumor     Hyperlipidemia     Hypertension     Hypokalemia 3/1/2018    Multiple pulmonary nodules 8/10/2018      Past Surgical History:   Procedure  Laterality Date    COLON SURGERY  03/07/2018    FINGER SURGERY      RIGHT COLECTOMY Right 03/07/2018           Family History   Problem Relation Age of Onset    Diabetes Mother     Heart disease Mother     Diabetes Sister     Breast cancer Sister     Diabetes Maternal Aunt     Diabetes Maternal Grandmother     Heart disease Maternal Grandmother     Heart disease Maternal Grandfather        Social History     Socioeconomic History    Marital status: Single     Spouse name: None    Number of children: None    Years of education: None    Highest education level: None   Social Needs    Financial resource strain: None    Food insecurity - worry: None    Food insecurity - inability: None    Transportation needs - medical: None    Transportation needs - non-medical: None   Occupational History    Occupation: shipping and recieving   Tobacco Use    Smoking status: Former Smoker    Smokeless tobacco: Never Used   Substance and Sexual Activity    Alcohol use: Yes     Comment: occ    Drug use: No    Sexual activity: Yes     Partners: Female   Other Topics Concern    None   Social History Narrative    Lives alone       Current Outpatient Medications   Medication Sig Dispense Refill    amLODIPine (NORVASC) 10 MG tablet Take 1 tablet (10 mg total) by mouth once daily. 90 tablet 1    aspirin (ECOTRIN) 81 MG EC tablet Take 81 mg by mouth.      atorvastatin (LIPITOR) 20 MG tablet Take one tablet daily.      azilsartan med-chlorthalidone (EDARBYCLOR) 40-12.5 mg Tab Take 1 tablet by mouth once daily. 90 tablet 1    labetalol (NORMODYNE) 300 MG tablet Take 1 tablet (300 mg total) by mouth 2 (two) times daily. 180 tablet 1    omeprazole (PRILOSEC) 20 MG capsule Take 20 mg by mouth once daily.      potassium chloride SA (K-DUR,KLOR-CON) 20 MEQ tablet Take 1 tablet (20 mEq total) by mouth 3 (three) times daily. 90 tablet 3     No current facility-administered medications for this visit.   "      Review of patient's allergies indicates:  No Known Allergies  Objective:      Blood pressure 126/76, pulse 66, temperature 97.9 °F (36.6 °C), temperature source Temporal, height 5' 8" (1.727 m), weight 107 kg (236 lb), SpO2 96 %. Body mass index is 35.88 kg/m².   Physical Exam   Constitutional: He appears well-developed and well-nourished.   Obese     Cardiovascular: Normal rate, regular rhythm and normal heart sounds. Exam reveals no gallop and no friction rub.   No murmur heard.  Pulmonary/Chest: Effort normal and breath sounds normal.   Nursing note and vitals reviewed.        Telephone on 03/15/2019   Component Date Value Ref Range Status    WBC 03/15/2019 4.3* 5.0 - 10.0 K/uL Final    RBC 03/15/2019 5.52  4.30 - 5.90 M/uL Final    Hemoglobin 03/15/2019 15.2  14.0 - 16.0 g/dL Final    Hematocrit 03/15/2019 46.4  39.0 - 55.0 % Final    MCV 03/15/2019 84.1  80.0 - 100.0 fL Final    MCH 03/15/2019 27.5  25.0 - 35.0 pg Final    MCHC 03/15/2019 32.8  31.0 - 36.0 g/dL Final    RDW 03/15/2019 12.8  11.7 - 14.9 % Final    Platelets 03/15/2019 233  140 - 440 K/uL Final    MPV 03/15/2019 9.5  8.8 - 12.7 fL Final    Gran% 03/15/2019 47.7  % Final    Lymph% 03/15/2019 38.8  % Final    Mono% 03/15/2019 9.3  % Final    Eosinophil% 03/15/2019 3.5  % Final    Basophil% 03/15/2019 0.5  % Final    Gran # (ANC) 03/15/2019 2.1  1.4 - 6.5 K/uL Final    Lymph # 03/15/2019 1.7  1.2 - 3.4 K/uL Final    Mono # 03/15/2019 0.4  0.1 - 0.6 K/uL Final    Eos # 03/15/2019 0.2  0.0 - 0.7 K/uL Final    Baso # 03/15/2019 0.0  0.0 - 0.2 K/uL Final    Immature Grans (Abs) 03/15/2019 0.0  0.0 - 1.0 K/uL Final    Immature Granulocytes 03/15/2019 0.2  % Final    nRBC% 03/15/2019 0  % Final    Glucose 03/15/2019 101* 70 - 99 mg/dL Final    BUN, Bld 03/15/2019 17  8 - 20 mg/dL Final    Creatinine 03/15/2019 1.24  0.60 - 1.40 mg/dL Final    Calcium 03/15/2019 9.1  7.7 - 10.4 mg/dL Final    Sodium 03/15/2019 139  134 - " 144 mmol/L Final    Potassium 03/15/2019 3.2* 3.5 - 5.0 mmol/L Final    Chloride 03/15/2019 100  98 - 110 mmol/L Final    CO2 03/15/2019 33.3* 22.8 - 31.6 mmol/L Final    Albumin 03/15/2019 4.3  3.1 - 4.7 g/dL Final    Total Bilirubin 03/15/2019 1.4* 0.3 - 1.0 mg/dL Final    Alkaline Phosphatase 03/15/2019 64  40 - 104 IU/L Final    Total Protein 03/15/2019 7.5  6.0 - 8.2 g/dL Final    ALT (SGPT) 03/15/2019 23  3 - 33 IU/L Final    AST 03/15/2019 31  10 - 40 IU/L Final    CEA 03/15/2019 1.5  ng/mL Final    Comment: EXPECTED VALUES                    0.0-3.0   3.1-5.0  5.1-10.0  >10.0              n      ng/mL    ng/mL    ng/mL     ng/mLNON SMOKERS   151     95.4%    3.9%     0.7%      0.0%SMOKERS       150     82.0%    8.7%     8.0%      1.3%TOTAL         301       88.7%    6.3%     4.3%      0.7%The CEA assay used is a paramagnetic particle,chemiluminescent immunoassay for the quantitativedetermination of Carcinoembryonic Antigen (CEA) levelsin human serum.     ]  Assessment:       1. Essential hypertension        Plan:       Vince was seen today for hypertension and hyperlipidemia.    Diagnoses and all orders for this visit:    Essential hypertension  Comments:  BP controlled with him back on meds

## 2019-04-02 ENCOUNTER — OFFICE VISIT (OUTPATIENT)
Dept: PULMONOLOGY | Facility: CLINIC | Age: 54
End: 2019-04-02
Payer: COMMERCIAL

## 2019-04-02 VITALS
OXYGEN SATURATION: 97 % | HEIGHT: 68 IN | WEIGHT: 240 LBS | HEART RATE: 75 BPM | SYSTOLIC BLOOD PRESSURE: 138 MMHG | BODY MASS INDEX: 36.37 KG/M2 | DIASTOLIC BLOOD PRESSURE: 84 MMHG

## 2019-04-02 DIAGNOSIS — R91.8 MULTIPLE PULMONARY NODULES: Primary | ICD-10-CM

## 2019-04-02 DIAGNOSIS — C18.2 PRIMARY ADENOCARCINOMA OF ASCENDING COLON: ICD-10-CM

## 2019-04-02 PROCEDURE — 3008F PR BODY MASS INDEX (BMI) DOCUMENTED: ICD-10-PCS | Mod: ,,, | Performed by: INTERNAL MEDICINE

## 2019-04-02 PROCEDURE — 99213 PR OFFICE/OUTPT VISIT, EST, LEVL III, 20-29 MIN: ICD-10-PCS | Mod: ,,, | Performed by: INTERNAL MEDICINE

## 2019-04-02 PROCEDURE — 3008F BODY MASS INDEX DOCD: CPT | Mod: ,,, | Performed by: INTERNAL MEDICINE

## 2019-04-02 PROCEDURE — 3075F SYST BP GE 130 - 139MM HG: CPT | Mod: ,,, | Performed by: INTERNAL MEDICINE

## 2019-04-02 PROCEDURE — 99213 OFFICE O/P EST LOW 20 MIN: CPT | Mod: ,,, | Performed by: INTERNAL MEDICINE

## 2019-04-02 PROCEDURE — 3079F DIAST BP 80-89 MM HG: CPT | Mod: ,,, | Performed by: INTERNAL MEDICINE

## 2019-04-02 PROCEDURE — 3079F PR MOST RECENT DIASTOLIC BLOOD PRESSURE 80-89 MM HG: ICD-10-PCS | Mod: ,,, | Performed by: INTERNAL MEDICINE

## 2019-04-02 PROCEDURE — 3075F PR MOST RECENT SYSTOLIC BLOOD PRESS GE 130-139MM HG: ICD-10-PCS | Mod: ,,, | Performed by: INTERNAL MEDICINE

## 2019-04-02 NOTE — PROGRESS NOTES
Office Visit    Patient Name: Vince Kwon  MRN: [id number]  : 1965      Reason for visit: Pulmonary nodules    HPI:     2018 - Pt had surgery for colon cancer in 3/2018 - has not needed any chemotherapy (sees Dr Welch), had PET scan showing a few small pulmonary nodules.  Ct scan done 2018 (3 months later) was stable, here for evaluation.  He was a smoker (1 pack per week for 3 years).    2019 - Here for follow up, most recent PET scan was good (see below).  No new complaints.    Past Medical History    Past Medical History:   Diagnosis Date    Colon tumor     Hyperlipidemia     Hypertension     Hypokalemia 3/1/2018    Multiple pulmonary nodules 8/10/2018       Past Surgical History    Past Surgical History:   Procedure Laterality Date    COLON SURGERY  2018    FINGER SURGERY      RIGHT COLECTOMY Right 2018           Medications      Current Outpatient Medications:     amLODIPine (NORVASC) 10 MG tablet, Take 1 tablet (10 mg total) by mouth once daily., Disp: 90 tablet, Rfl: 1    aspirin (ECOTRIN) 81 MG EC tablet, Take 81 mg by mouth., Disp: , Rfl:     atorvastatin (LIPITOR) 20 MG tablet, Take one tablet daily., Disp: , Rfl:     azilsartan med-chlorthalidone (EDARBYCLOR) 40-12.5 mg Tab, Take 1 tablet by mouth once daily., Disp: 90 tablet, Rfl: 1    labetalol (NORMODYNE) 300 MG tablet, Take 1 tablet (300 mg total) by mouth 2 (two) times daily., Disp: 180 tablet, Rfl: 1    omeprazole (PRILOSEC) 20 MG capsule, Take 20 mg by mouth once daily., Disp: , Rfl:     potassium chloride SA (K-DUR,KLOR-CON) 20 MEQ tablet, Take 1 tablet (20 mEq total) by mouth 3 (three) times daily., Disp: 90 tablet, Rfl: 3    Allergies    Review of patient's allergies indicates:  No Known Allergies    SocHx    Social History     Tobacco Use   Smoking Status Former Smoker   Smokeless Tobacco Never Used       Social History     Substance and Sexual Activity   Alcohol Use Yes     "Comment: occ       Drug Use - no  Occupation - works in [employer]  Asbestos exposure - no  Pets - no    FMHx    Family History   Problem Relation Age of Onset    Diabetes Mother     Heart disease Mother     Diabetes Sister     Breast cancer Sister     Diabetes Maternal Aunt     Diabetes Maternal Grandmother     Heart disease Maternal Grandmother     Heart disease Maternal Grandfather          Review of Systems  Review of Systems   Constitutional: Negative for chills, diaphoresis, fever, malaise/fatigue and weight loss.   HENT: Negative for congestion.    Eyes: Negative for pain.   Respiratory: Negative for cough, hemoptysis, sputum production, shortness of breath, wheezing and stridor.    Cardiovascular: Negative for chest pain, palpitations, orthopnea, claudication, leg swelling and PND.   Gastrointestinal: Negative for abdominal pain, constipation, diarrhea, heartburn, nausea and vomiting.   Genitourinary: Negative for dysuria, frequency and urgency.   Musculoskeletal: Negative for falls and myalgias.   Neurological: Negative for sensory change, focal weakness and weakness.   Psychiatric/Behavioral: Negative for depression. The patient is not nervous/anxious.        Physical Exam    Vitals:    04/02/19 1039   BP: 138/84   Pulse: 75   SpO2: 97%   Weight: 108.9 kg (240 lb)   Height: 5' 8" (1.727 m)       Physical Exam   Constitutional: He is oriented to person, place, and time. He appears well-developed and well-nourished. No distress.   overweight   HENT:   Head: Normocephalic and atraumatic.   Right Ear: External ear normal.   Left Ear: External ear normal.   Nose: Nose normal.   Mouth/Throat: Oropharynx is clear and moist.   Eyes: Pupils are equal, round, and reactive to light. EOM are normal.   Neck: Normal range of motion. Neck supple. No JVD present. No tracheal deviation present. No thyromegaly present.   Cardiovascular: Normal rate, regular rhythm, normal heart sounds and intact distal pulses. Exam " reveals no gallop and no friction rub.   No murmur heard.  Pulmonary/Chest: Effort normal and breath sounds normal. No stridor. No respiratory distress. He has no wheezes. He has no rales. He exhibits no tenderness.   Abdominal: Soft. Bowel sounds are normal. He exhibits no distension.   Musculoskeletal: Normal range of motion. He exhibits no edema or tenderness.   Lymphadenopathy:     He has no cervical adenopathy.   Neurological: He is alert and oriented to person, place, and time. He has normal reflexes. No cranial nerve deficit.   Skin: He is not diaphoretic.   Psychiatric: He has a normal mood and affect. His behavior is normal.   Nursing note and vitals reviewed.      Labs    Lab Results   Component Value Date    WBC 4.3 (L) 03/15/2019    HGB 15.2 03/15/2019    HCT 46.4 03/15/2019     03/15/2019       Sodium   Date Value Ref Range Status   03/15/2019 139 134 - 144 mmol/L      Potassium   Date Value Ref Range Status   03/15/2019 3.2 (L) 3.5 - 5.0 mmol/L      Chloride   Date Value Ref Range Status   03/15/2019 100 98 - 110 mmol/L      CO2   Date Value Ref Range Status   03/15/2019 33.3 (H) 22.8 - 31.6 mmol/L      Glucose   Date Value Ref Range Status   03/15/2019 101 (H) 70 - 99 mg/dL      BUN, Bld   Date Value Ref Range Status   03/15/2019 17 8 - 20 mg/dL      Creatinine   Date Value Ref Range Status   03/15/2019 1.24 0.60 - 1.40 mg/dL      Calcium   Date Value Ref Range Status   03/15/2019 9.1 7.7 - 10.4 mg/dL      Total Protein   Date Value Ref Range Status   03/15/2019 7.5 6.0 - 8.2 g/dL      Albumin   Date Value Ref Range Status   03/15/2019 4.3 3.1 - 4.7 g/dL      Total Bilirubin   Date Value Ref Range Status   03/15/2019 1.4 (H) 0.3 - 1.0 mg/dL      Alkaline Phosphatase   Date Value Ref Range Status   03/15/2019 64 40 - 104 IU/L      AST   Date Value Ref Range Status   03/15/2019 31 10 - 40 IU/L        Xrays    PET CT WITH IMAGE FUSION    HISTORY:  Lung field abnormal . Colon cancer status post  resection. Recent  abnormal CT revealing enlarged lymph nodes.    TECHNIQUE: Following IV administration of 12.7 mCi of F-18 labeled FDG into  right antecubital fossa and a 60 minute delay, PET CT was performed from the  vertex of the skull through the proximal thighs with an integrated PET CT  scanner with image fusion. CT images were obtained to aid in attenuation  correction and PET localization.  The patient's serum glucose at the time of the exam was   108  mg/dL.    COMPARISON: CT abdomen and pelvis 02/01/2019. PET/CT 04/11/2018    FINDINGS: Images through the brain demonstrate no acute intracranial process or  large intra-axial lesion. Mastoid air cells are clear. Small mucous retention  cyst or polyp along the posterior aspect of left maxillary sinus.    No pathologically enlarged or hypermetabolic lymph nodes within the neck.    No FDG avid pulmonary nodule or mass. Stable 3 mm right middle lobe pulmonary  nodule on image 112. No pathologically enlarged or hypermetabolic mediastinal,  hilar, or axillary lymph nodes.    No focal hepatic lesion. Gallbladder and biliary tree are unremarkable. Spleen,  pancreas, adrenal glands, kidneys, ureters, and urinary bladder are  unremarkable. Patient is status post partial resection of ascending colon.  Colon is otherwise unremarkable.    No pathologically enlarged or hypermetabolic lymph nodes within the abdomen or  pelvis.    Greater than expected FDG uptake within the spine could be on the basis of red  marrow conversion. No aggressive osseous abnormality.    IMPRESSION:    No convincing PET/CT evidence of metastatic disease.    Read and electronically signed by: Shubham Marino MD on 3/8/2019 11:33 AM CST      SHUBHAM MARINO MD    Impression/Plan    Problem List Items Addressed This Visit        Pulmonary    Multiple pulmonary nodules - Primary  - small and not likely significant, stable by PET scan  - will repeat CT scan in 9/2019  - RTC 6 months                 Rudi Crockett MD

## 2019-06-03 DIAGNOSIS — I10 ESSENTIAL HYPERTENSION: ICD-10-CM

## 2019-06-04 RX ORDER — AMLODIPINE BESYLATE 10 MG/1
TABLET ORAL
Qty: 90 TABLET | Refills: 1 | Status: SHIPPED | OUTPATIENT
Start: 2019-06-04 | End: 2019-06-28 | Stop reason: SDUPTHER

## 2019-06-28 ENCOUNTER — OFFICE VISIT (OUTPATIENT)
Dept: FAMILY MEDICINE | Facility: CLINIC | Age: 54
End: 2019-06-28
Payer: COMMERCIAL

## 2019-06-28 VITALS
SYSTOLIC BLOOD PRESSURE: 132 MMHG | HEART RATE: 70 BPM | WEIGHT: 242 LBS | HEIGHT: 68 IN | OXYGEN SATURATION: 95 % | BODY MASS INDEX: 36.68 KG/M2 | DIASTOLIC BLOOD PRESSURE: 80 MMHG | TEMPERATURE: 98 F

## 2019-06-28 DIAGNOSIS — I10 ESSENTIAL HYPERTENSION: Primary | ICD-10-CM

## 2019-06-28 DIAGNOSIS — L72.3 SEBACEOUS CYST OF RIGHT AXILLA: ICD-10-CM

## 2019-06-28 PROCEDURE — 3008F PR BODY MASS INDEX (BMI) DOCUMENTED: ICD-10-PCS | Mod: ,,, | Performed by: INTERNAL MEDICINE

## 2019-06-28 PROCEDURE — 99213 PR OFFICE/OUTPT VISIT, EST, LEVL III, 20-29 MIN: ICD-10-PCS | Mod: ,,, | Performed by: INTERNAL MEDICINE

## 2019-06-28 PROCEDURE — 3008F BODY MASS INDEX DOCD: CPT | Mod: ,,, | Performed by: INTERNAL MEDICINE

## 2019-06-28 PROCEDURE — 3075F PR MOST RECENT SYSTOLIC BLOOD PRESS GE 130-139MM HG: ICD-10-PCS | Mod: ,,, | Performed by: INTERNAL MEDICINE

## 2019-06-28 PROCEDURE — 99213 OFFICE O/P EST LOW 20 MIN: CPT | Mod: ,,, | Performed by: INTERNAL MEDICINE

## 2019-06-28 PROCEDURE — 3079F PR MOST RECENT DIASTOLIC BLOOD PRESSURE 80-89 MM HG: ICD-10-PCS | Mod: ,,, | Performed by: INTERNAL MEDICINE

## 2019-06-28 PROCEDURE — 3079F DIAST BP 80-89 MM HG: CPT | Mod: ,,, | Performed by: INTERNAL MEDICINE

## 2019-06-28 PROCEDURE — 3075F SYST BP GE 130 - 139MM HG: CPT | Mod: ,,, | Performed by: INTERNAL MEDICINE

## 2019-06-28 RX ORDER — LABETALOL 300 MG/1
300 TABLET, FILM COATED ORAL 2 TIMES DAILY
Qty: 180 TABLET | Refills: 1 | Status: SHIPPED | OUTPATIENT
Start: 2019-06-28 | End: 2019-11-22 | Stop reason: SDUPTHER

## 2019-06-28 RX ORDER — ATORVASTATIN CALCIUM 20 MG/1
20 TABLET, FILM COATED ORAL DAILY
Qty: 90 TABLET | Refills: 1 | Status: SHIPPED | OUTPATIENT
Start: 2019-06-28 | End: 2019-11-22 | Stop reason: SDUPTHER

## 2019-06-28 RX ORDER — AMLODIPINE BESYLATE 10 MG/1
10 TABLET ORAL DAILY
Qty: 90 TABLET | Refills: 1 | Status: SHIPPED | OUTPATIENT
Start: 2019-06-28 | End: 2019-11-22 | Stop reason: SDUPTHER

## 2019-06-28 NOTE — PROGRESS NOTES
Subjective:       Patient ID: Vince Kwon is a 53 y.o. male.    Chief Complaint: Hypertension (continuity of care) and Mass (growth under right arm)    Here for routine f/u and med refills.  He had recent colonoscopy which looked good; due rpt in 3 years.    Hypertension   This is a chronic problem. The problem is controlled. Pertinent negatives include no chest pain, headaches, neck pain, palpitations, peripheral edema or shortness of breath. Past treatments include beta blockers, calcium channel blockers, diuretics and angiotensin blockers. The current treatment provides moderate improvement.   Mass   The current episode started more than 1 month ago (about 3-4 months). The problem has been unchanged. Pertinent negatives include no abdominal pain, arthralgias, chest pain, chills, congestion, coughing, fatigue, fever, headaches, joint swelling, myalgias, nausea, neck pain, numbness, rash, vomiting or weakness. Associated symptoms comments: Knot in right axilla.  Not painful or draining.  Tried changing deodorants; no difference.  Notices it has an odor if he rubs finger on it.. Treatments tried: OTC cream for boils. The treatment provided no relief.     Review of Systems   Constitutional: Negative for chills, fatigue, fever and unexpected weight change.   HENT: Negative for congestion, hearing loss, postnasal drip, rhinorrhea, trouble swallowing and voice change.    Eyes: Negative for photophobia and visual disturbance.   Respiratory: Negative for apnea, cough, choking, chest tightness, shortness of breath and wheezing.    Cardiovascular: Negative for chest pain, palpitations and leg swelling.   Gastrointestinal: Negative for abdominal pain, blood in stool, constipation, diarrhea, nausea, rectal pain and vomiting.   Endocrine: Negative for cold intolerance, heat intolerance, polydipsia and polyuria.   Genitourinary: Negative for decreased urine volume, difficulty urinating, discharge, dysuria, flank pain,  frequency, genital sores, hematuria, testicular pain and urgency.   Musculoskeletal: Negative for arthralgias, back pain, gait problem, joint swelling, myalgias and neck pain.   Skin: Negative for color change, rash and wound.   Allergic/Immunologic: Negative for environmental allergies and food allergies.   Neurological: Negative for dizziness, tremors, seizures, syncope, facial asymmetry, speech difficulty, weakness, light-headedness, numbness and headaches.   Hematological: Negative for adenopathy. Does not bruise/bleed easily.   Psychiatric/Behavioral: Negative for confusion, hallucinations, sleep disturbance and suicidal ideas. The patient is not nervous/anxious.        Past Medical History:   Diagnosis Date    Colon tumor     Hyperlipidemia     Hypertension     Hypokalemia 3/1/2018    Multiple pulmonary nodules 8/10/2018      Past Surgical History:   Procedure Laterality Date    COLON SURGERY  03/07/2018    COLONOSCOPY      FINGER SURGERY      RIGHT COLECTOMY Right 03/07/2018           Family History   Problem Relation Age of Onset    Diabetes Mother     Heart disease Mother     Diabetes Sister     Breast cancer Sister     Diabetes Maternal Aunt     Diabetes Maternal Grandmother     Heart disease Maternal Grandmother     Heart disease Maternal Grandfather        Social History     Socioeconomic History    Marital status: Single     Spouse name: Not on file    Number of children: Not on file    Years of education: Not on file    Highest education level: Not on file   Occupational History    Occupation: shipping and recieving   Social Needs    Financial resource strain: Not on file    Food insecurity:     Worry: Not on file     Inability: Not on file    Transportation needs:     Medical: Not on file     Non-medical: Not on file   Tobacco Use    Smoking status: Former Smoker    Smokeless tobacco: Never Used   Substance and Sexual Activity    Alcohol use: Yes     Comment: occ  "   Drug use: No    Sexual activity: Yes     Partners: Female   Lifestyle    Physical activity:     Days per week: Not on file     Minutes per session: Not on file    Stress: Not at all   Relationships    Social connections:     Talks on phone: Not on file     Gets together: Not on file     Attends Jainism service: Not on file     Active member of club or organization: Not on file     Attends meetings of clubs or organizations: Not on file     Relationship status: Not on file   Other Topics Concern    Not on file   Social History Narrative    Lives alone       Current Outpatient Medications   Medication Sig Dispense Refill    amLODIPine (NORVASC) 10 MG tablet Take 1 tablet (10 mg total) by mouth once daily. 90 tablet 1    aspirin (ECOTRIN) 81 MG EC tablet Take 81 mg by mouth.      atorvastatin (LIPITOR) 20 MG tablet Take 1 tablet (20 mg total) by mouth once daily. Take one tablet daily. 90 tablet 1    azilsartan med-chlorthalidone (EDARBYCLOR) 40-12.5 mg Tab Take 1 tablet by mouth once daily. 90 tablet 1    labetalol (NORMODYNE) 300 MG tablet Take 1 tablet (300 mg total) by mouth 2 (two) times daily. 180 tablet 1    omeprazole (PRILOSEC) 20 MG capsule Take 20 mg by mouth once daily.      potassium chloride SA (K-DUR,KLOR-CON) 20 MEQ tablet Take 1 tablet (20 mEq total) by mouth 3 (three) times daily. 90 tablet 3     No current facility-administered medications for this visit.        Review of patient's allergies indicates:  No Known Allergies  Objective:    HPI     Hypertension      Additional comments: continuity of care              Mass      Additional comments: growth under right arm          Last edited by Jose R Willis MA on 6/28/2019  8:10 AM. (History)      Blood pressure 132/80, pulse 70, temperature 97.9 °F (36.6 °C), temperature source Temporal, height 5' 8" (1.727 m), weight 109.8 kg (242 lb), SpO2 95 %. Body mass index is 36.8 kg/m².   Physical Exam   Constitutional: He appears " well-developed. No distress.   Obese     HENT:   Nose: Nose normal.   Mouth/Throat: Oropharynx is clear and moist.   Eyes: Conjunctivae are normal. Right eye exhibits no discharge. Left eye exhibits no discharge. No scleral icterus.   Neck: Carotid bruit is not present.   Cardiovascular: Normal rate, regular rhythm and normal heart sounds.   No murmur heard.  Pulmonary/Chest: Effort normal and breath sounds normal. No respiratory distress. He has no decreased breath sounds. He has no wheezes. He has no rhonchi. He has no rales.   Abdominal: Soft. He exhibits no distension. There is no tenderness. There is no rebound and no guarding.   Musculoskeletal: He exhibits no edema.   Neurological: He is alert. He displays no tremor.   Skin: Skin is warm and dry. Rash noted. Rash is nodular (approx 1cm subcutaneous nodule in right axilla; smooth, mobile, well demarcated; feels like a sebaceous cyst; does not appear to be a lymph node).   Psychiatric: He has a normal mood and affect. His speech is normal.   Nursing note and vitals reviewed.          Assessment:       1. Essential hypertension    2. Sebaceous cyst of right axilla        Plan:       Vince was seen today for hypertension and mass.    Diagnoses and all orders for this visit:    Essential hypertension  -     amLODIPine (NORVASC) 10 MG tablet; Take 1 tablet (10 mg total) by mouth once daily.  -     atorvastatin (LIPITOR) 20 MG tablet; Take 1 tablet (20 mg total) by mouth once daily. Take one tablet daily.  -     labetalol (NORMODYNE) 300 MG tablet; Take 1 tablet (300 mg total) by mouth 2 (two) times daily.  -     azilsartan med-chlorthalidone (EDARBYCLOR) 40-12.5 mg Tab; Take 1 tablet by mouth once daily.    Sebaceous cyst of right axilla  Comments:  Reassurance and observation

## 2019-09-20 ENCOUNTER — HOSPITAL ENCOUNTER (OUTPATIENT)
Dept: RADIOLOGY | Facility: HOSPITAL | Age: 54
Discharge: HOME | End: 2019-09-20
Attending: INTERNAL MEDICINE
Payer: COMMERCIAL

## 2019-09-20 DIAGNOSIS — C18.2 PRIMARY ADENOCARCINOMA OF ASCENDING COLON: ICD-10-CM

## 2019-09-20 DIAGNOSIS — R91.8 MULTIPLE PULMONARY NODULES: ICD-10-CM

## 2019-09-20 LAB
CREAT SERPL-MCNC: 1.4 MG/DL (ref 0.5–1.4)
SAMPLE: NORMAL

## 2019-09-20 PROCEDURE — 25500020 PHARM REV CODE 255: Mod: PO | Performed by: INTERNAL MEDICINE

## 2019-09-20 PROCEDURE — 74177 CT ABD & PELVIS W/CONTRAST: CPT | Mod: TC,PO

## 2019-09-20 RX ADMIN — IOHEXOL 100 ML: 350 INJECTION, SOLUTION INTRAVENOUS at 10:09

## 2019-09-23 NOTE — PROGRESS NOTES
[facility] Hematology/Oncology  PROGRESS NOTE       Subjective:       Patient ID:   NAME: Vince Kwon : 1965     53 y.o. male    Referring Doc: Ugo Johnson  Other Physicians: Natalya Gutierrez    Chief Complaint:  Colon ca f/u    History of Present Illness:     Patient returns today for a regularly scheduled follow-up visit.  The patient is here by himself. He is doing ok with no new issues.  Bowel movements have been normal. He denies any CP, SOB, HA's or N/V.     He last  saw Dr Crockett with pulmonary on 2019 and he saw Dr Hoang on 2019.     No new labs since 3/2019. He had surveillance CT scans on 2019    .         ROS:   GEN: normal without any fever, night sweats or weight loss  HEENT: normal with no HA's, sore throat, stiff neck, changes in vision  CV: normal with no CP, SOB, PND, LIGHT or orthopnea  PULM: normal with no SOB, cough, hemoptysis, sputum or pleuritic pain  GI: normal with no abdominal pain, nausea, vomiting, constipation, diarrhea, melanotic stools, BRBPR, or hematemesis  : normal with no hematuria, dysuria  BREAST: normal with no mass, discharge, pain  SKIN: normal with no rash, erythema, bruising, or swelling    Allergies:  Review of patient's allergies indicates:  No Known Allergies    Medications:    Current Outpatient Medications:     amLODIPine (NORVASC) 10 MG tablet, Take 1 tablet (10 mg total) by mouth once daily., Disp: 90 tablet, Rfl: 1    aspirin (ECOTRIN) 81 MG EC tablet, Take 81 mg by mouth., Disp: , Rfl:     atorvastatin (LIPITOR) 20 MG tablet, Take 1 tablet (20 mg total) by mouth once daily. Take one tablet daily., Disp: 90 tablet, Rfl: 1    azilsartan med-chlorthalidone (EDARBYCLOR) 40-12.5 mg Tab, Take 1 tablet by mouth once daily., Disp: 90 tablet, Rfl: 1    labetalol (NORMODYNE) 300 MG tablet, Take 1 tablet (300 mg total) by mouth 2 (two) times daily., Disp: 180 tablet, Rfl: 1    omeprazole (PRILOSEC) 20 MG capsule, Take 20 mg by mouth once  daily., Disp: , Rfl:     potassium chloride SA (K-DUR,KLOR-CON) 20 MEQ tablet, Take 1 tablet (20 mEq total) by mouth 3 (three) times daily., Disp: 90 tablet, Rfl: 3    PMHx/PSHx Updates:  See patient's last visit with me on 3/19/2019.  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    THE TUMOR PENETRATES THE MUSCULARIS PROPRIA AND INVADES THE  PERICOLONIC FAT.    THE PROXIMAL (ILEAL) AND DISTAL (COLONIC) SURGICAL MARGINS OF  RESECTION ARE FREE OF    MALIGNANCY.    A TOTAL OF TWENTY-SEVEN (27) PERICOLONIC LYMPH NODES ARE FREE OF  METASTATIC TUMOR.    APPENDIX: REACTIVE MUCOSAL LYMPHOID HYPERPLASIA.     NO EVIDENCE OF METASTATIC CARCINOMA.    Procedure:  Right hemicolectomy  Specimen Length (if applicable):  <CR-*Specimen Length (if applicable):     12.0 cm>  Tumor Site:  Cecum  Tumor Location  Tumor Size:  Greatest dimension:  4.5 cm  Macroscopic Tumor Perforation:  Not identified  Macroscopic Intactness of Mesorectum:  Histologic Type:  Adenocarcinoma, focal mucinous (colloid) carcinoma  component.  Histologic Grade:  Low grade (well differentiated to moderately  differentiated)  Histologic Features Suggestive of Microsatellite Instability:  Intratumoral Lymphocytic Response (tumor-infiltrating lymphocytes):  Peritumor Lymphocytic Response (Crohn-like response):  Tumor Subtype and Differentiation:  Microscopic Tumor Extension:  Tumor invades through the muscularis  propria into the subserosal adipose tissue or the nonperitonealized  pericolic or perirectal soft tissues but does not extend to the serosal  surface  Margins    TNM Descriptors:  Primary Tumor (pT):  pT3:  Tumor invades through the muscularis propria  into pericolorectal tissues  Regional Lymph Nodes (pN):  pN0:  No  regional lymph node metastasis  Distant Metastasis:  Not applicable.      Objective:     Vitals:  Blood pressure (!) 142/90, pulse 78, temperature 98.7 °F (37.1 °C), temperature source Oral, resp. rate 20, weight 111.7 kg (246 lb 4.8 oz).    Physical Examination:   GEN: no apparent distress, comfortable; AAOx3  HEAD: atraumatic and normocephalic  EYES: no pallor, no icterus, PERRLA  ENT: OMM, no pharyngeal erythema, external ears WNL; no nasal discharge; no thrush  NECK: no masses, thyroid normal, trachea midline, no LAD/LN's, supple  CV: RRR with no murmur; normal pulse; normal S1 and S2; no pedal edema  CHEST: Normal respiratory effort; CTAB; normal breath sounds; no wheeze or crackles  ABDOM: nontender and nondistended; soft; normal bowel sounds; no rebound/guarding  MUSC/Skeletal: ROM normal; no crepitus; joints normal; no deformities or arthropathy  EXTREM: no clubbing, cyanosis, inflammation or swelling  SKIN: no rashes, lesions, ulcers, petechiae or subcutaneous nodules  : no escamilla  NEURO: grossly intact; motor/sensory WNL; AAOx3; no tremors  PSYCH: normal mood, affect and behavior  LYMPH: normal cervical, supraclavicular, axillary and groin LN's            Labs:     3/15/2019    Lab Results   Component Value Date    WBC 4.3 (L) 03/15/2019    HGB 15.2 03/15/2019    HCT 46.4 03/15/2019    MCV 84.1 03/15/2019     03/15/2019     BMP  Lab Results   Component Value Date     03/15/2019    K 3.2 (L) 03/15/2019     03/15/2019    CO2 33.3 (H) 03/15/2019    BUN 17 03/15/2019    CREATININE 1.24 03/15/2019    CALCIUM 9.1 03/15/2019     Lab Results   Component Value Date    AST 31 03/15/2019    ALKPHOS 64 03/15/2019    BILITOT 1.4 (H) 03/15/2019     Lab Results   Component Value Date    CEA 1.5 03/15/2019           Radiology/Diagnostic Studies:    CT scans  9/20/2019:    Impression       No evidence of recurrence or metastatic disease    Stable tiny noncalcified nodules in the right lung           Ct  scan 2/1/2019:    IMPRESSION:  1. Interval development of several prominent to borderline enlarged lymph nodes  within the central mesentery and right lower quadrant, nonspecific. These are  suspicious for metastatic disease, given lymphadenopathy seen on the patient's  original preoperative CT of 02/23/2018. Correlation with serum tumor markers,  and consideration for further evaluation with PET CT, are recommended.  2. No additional evidence for metastatic disease in the abdomen or the pelvis.          CT scans 7/27/2018:    IMPRESSION:    1. Stable appearance of subcentimeter right middle lobe and right upper lobe  pulmonary nodules.  2. Status post partial colonic resection with expected postoperative changes,  as discussed above. No residual mass or pathologically enlarged lymph nodes in  the abdomen or pelvis.  3. Incidental findings as above.            PET/CT: 4/11/2018    IMPRESSION:    1. Persistent mild wall thickening centered about site of ileocolonic  anastomosis with associated FDG uptake is most likely postoperative in nature  and due to residual inflammation. Residual malignancy cannot be excluded on the  basis of imaging alone, and correlation with surgical resection margins is  requested.    2. No current convincing evidence of metastatic disease.    3. 3 mm nodular density which is vague in superior right middle lobe is nonspecific. CT thorax without IV contrast follow-up is recommended within 3-6  months to simply document stability.          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 Comparison: None Findings: Cardiac silhouette size is normal. Lungs are clear without effusion. No pneumothorax. No acute osseous abnormality. IMPRESSION: No acute pulmonary process. Read and electronically signed by: Shubham Marino MD on 2/28/2018 6:22 PM CST SHUBHAM MARINO MD    Ct Abdomen Pelvis  With Contrast    Result Date: 2/23/2018  CMS MANDATED QUALITY DATA - CT RADIATION ? 436 All CT scans at this facility utilize dose modulation, iterative reconstruction, and/or weight based dosing when appropriate to reduce radiation dose to as low as reasonably achievable. CLINICAL HISTORY: 52 years (1965) Male K63.9 TECHNIQUE: MDI ABDOMEN AND PELVIS W  CONTRAST CT. 296 images obtained. Axial CT images of the abdomen and pelvis were obtained from the dome of the diaphragm to the proximal thigh. CONTRAST: 100 mL of IV Omni 350 was administered uneventfully by IV. Oral contrast was also administered COMPARISON: None available. FINDINGS: Lower Thorax: The lung bases are clear. The heart is normal in size and there is no pericardial effusion. CT Abdomen: Liver: The liver is normal size and imaging appearance. Gallbladder: The gallbladder is unremarkable without acute cholecystitis. Biliary Tree: There is no intra or extrahepatic duct dilation. Spleen: The spleen is normal. Pancreas: The pancreas is normal. Adrenal Glands: The adrenal glands appear within normal limits. Kidneys: The kidneys are normal in imaging appearance without hydronephrosis or hydroureter. Vasculature: The aorta is normal in course and caliber. Lymph nodes: No abdominal lymphadenopathy is seen by size criteria. Intraperitoneal structures: There is no ascites. Bowel:  There is an apple core like lesion in the ascending colon extending over a length of approximately 5 cm (10 o'clock-8 o'clock position involving a majority of the circumference) the soft tissue mass measures approximately 2 cm in thickness. There are numerous small rounded lymph nodes in the cecal mesentery, none of which are enlarged by size criteria, however given the morphology and location these may be involved, for example a 15 x 7 mm node (image 121). Abdominal wall: The abdominal wall and musculature are normal. Musculoskeletal: There is a transitional lumbosacral vertebral  "body (S1) with a hypoplastic disc at S1-S2. There is moderate to severe disc height loss at L4-L5. No aggressive appearing lytic or blastic lesion is identified. CT Pelvis: Bladder: The urinary bladder is within normal limits. Reproductive Organs: The prostate and seminal vesicles are within normal limits. Pelvic Lymph nodes: No pelvic lymphadenopathy or pelvic mass is identified. IMPRESSION: 1. Apple-core like lesion in the descending colon most consistent with a primary colonic adenocarcinoma, there is no evidence of obstruction, pneumatosis intra-abdominal free air or abscess. 2. No lymphadenopathy by size criteria and no finding to specifically suggest metastatic disease in the abdomen or pelvis. Read and electronically signed by: Singh Pineda MD on 2/23/2018 9:53 AM CST SINGH PINEDA MD      I have reviewed all available lab results and radiology reports.    Assessment/Plan:   (1) 52 y.o. male with diagnosis of right colon mass found on recent colonoscopy on 2/16/2018 with Dr Johnson.  - he had been seen by Dr Gutierrez with [facility]   - s/p right colectomy with Dr Gutierrez on 3/7/2018  - 27 LN's were all negative  - T3 N0 and Stage IIA  - low grade adenocarcinoma  - PET scan on 4/11/2018 with no definitive evidence of ant metastatic process  - repeat CEA was 2.1  - will most likely not need chemotherapy if he remains a Stage II  - MMR was negative but all of all JESSICA was "stable" which has been shown to illicit a poorer prognosis in general but would not changes the current plan of therapy (discussed with the patient in detail)  - prior CT scans on 7/27/2018 which appeared to be stable; however, repeat CT on 2/1/2019 showed some increase in LN's which needed to be addressed with PET scans but his insurance declined   - latest CT's on 9/20/2019 with no evidence of recurrent cancer  - colonoscopy this year was good per patient with another one planned for 3 yrs     (2) HTN and hypercholesterolemia     (3) " Prior mini-CVA - HTN related; no subsequent deficits; sounds like he had a TIA    (4) RML lung nodule on PEt at 3mm with no FDG uptake; CT stable;  - he last saw Dr Crockett with pulmonary on 4/2/2019  - latest Ct was stable on 9/20/2019    1. Primary adenocarcinoma of ascending colon           PLAN:  1.  Repeat scans in 6 months  2. F/u with GI, PCP, Gen Surg, pulm etc  3. F/u with Dr Gutierrez and Dr Johnson, and PCP   4. Encouraged compliance with f/u with Pulm  5.  Check up to date labs incl CEA  6. RTC 6 months  Fax note to Jose Johnson Jr, MD, [place]    Discussion:       I spent over 25 mins of time with the patient. Reviewed results of the recently ordered labs, tests and studies; made directives with regards to the results. Over half of this time was spent couseling and coordinating care.    I have explained all of the above in detail and the patient understands all of the current recommendation(s). I have answered all of their questions to the best of my ability and to their complete satisfaction.   The patient is to continue with the current management plan.            Electronically signed by Carlo Welch MD

## 2019-09-24 ENCOUNTER — OFFICE VISIT (OUTPATIENT)
Dept: HEMATOLOGY/ONCOLOGY | Facility: CLINIC | Age: 54
End: 2019-09-24
Payer: COMMERCIAL

## 2019-09-24 ENCOUNTER — LAB VISIT (OUTPATIENT)
Dept: LAB | Facility: HOSPITAL | Age: 54
End: 2019-09-24
Attending: INTERNAL MEDICINE
Payer: COMMERCIAL

## 2019-09-24 VITALS
DIASTOLIC BLOOD PRESSURE: 90 MMHG | BODY MASS INDEX: 37.45 KG/M2 | SYSTOLIC BLOOD PRESSURE: 142 MMHG | WEIGHT: 246.31 LBS | HEART RATE: 78 BPM | TEMPERATURE: 99 F | RESPIRATION RATE: 20 BRPM

## 2019-09-24 DIAGNOSIS — R91.8 LUNG NODULES: ICD-10-CM

## 2019-09-24 DIAGNOSIS — C18.2 PRIMARY ADENOCARCINOMA OF ASCENDING COLON: Primary | ICD-10-CM

## 2019-09-24 DIAGNOSIS — C18.2 PRIMARY ADENOCARCINOMA OF ASCENDING COLON: ICD-10-CM

## 2019-09-24 LAB
ALBUMIN SERPL BCP-MCNC: 4.1 G/DL (ref 3.5–5.2)
ALP SERPL-CCNC: 68 U/L (ref 55–135)
ALT SERPL W/O P-5'-P-CCNC: 40 U/L (ref 10–44)
ANION GAP SERPL CALC-SCNC: 10 MMOL/L (ref 8–16)
AST SERPL-CCNC: 62 U/L (ref 10–40)
BASOPHILS # BLD AUTO: 0.02 K/UL (ref 0–0.2)
BASOPHILS NFR BLD: 0.4 % (ref 0–1.9)
BILIRUB SERPL-MCNC: 1 MG/DL (ref 0.1–1)
BUN SERPL-MCNC: 16 MG/DL (ref 6–20)
CALCIUM SERPL-MCNC: 9.4 MG/DL (ref 8.7–10.5)
CEA SERPL-MCNC: 2.3 NG/ML (ref 0–5)
CHLORIDE SERPL-SCNC: 102 MMOL/L (ref 95–110)
CO2 SERPL-SCNC: 30 MMOL/L (ref 23–29)
CREAT SERPL-MCNC: 1.4 MG/DL (ref 0.5–1.4)
DIFFERENTIAL METHOD: NORMAL
EOSINOPHIL # BLD AUTO: 0.2 K/UL (ref 0–0.5)
EOSINOPHIL NFR BLD: 4.4 % (ref 0–8)
ERYTHROCYTE [DISTWIDTH] IN BLOOD BY AUTOMATED COUNT: 13.3 % (ref 11.5–14.5)
EST. GFR  (AFRICAN AMERICAN): >60 ML/MIN/1.73 M^2
EST. GFR  (NON AFRICAN AMERICAN): 56.2 ML/MIN/1.73 M^2
GLUCOSE SERPL-MCNC: 119 MG/DL (ref 70–110)
HCT VFR BLD AUTO: 46.4 % (ref 40–54)
HGB BLD-MCNC: 15 G/DL (ref 14–18)
IMM GRANULOCYTES # BLD AUTO: 0.01 K/UL (ref 0–0.04)
IMM GRANULOCYTES NFR BLD AUTO: 0.2 % (ref 0–0.5)
LYMPHOCYTES # BLD AUTO: 1.6 K/UL (ref 1–4.8)
LYMPHOCYTES NFR BLD: 35.3 % (ref 18–48)
MCH RBC QN AUTO: 27.5 PG (ref 27–31)
MCHC RBC AUTO-ENTMCNC: 32.3 G/DL (ref 32–36)
MCV RBC AUTO: 85 FL (ref 82–98)
MONOCYTES # BLD AUTO: 0.4 K/UL (ref 0.3–1)
MONOCYTES NFR BLD: 9.8 % (ref 4–15)
NEUTROPHILS # BLD AUTO: 2.2 K/UL (ref 1.8–7.7)
NEUTROPHILS NFR BLD: 49.9 % (ref 38–73)
NRBC BLD-RTO: 0 /100 WBC
PLATELET # BLD AUTO: 208 K/UL (ref 150–350)
PMV BLD AUTO: 9.9 FL (ref 9.2–12.9)
POTASSIUM SERPL-SCNC: 3.2 MMOL/L (ref 3.5–5.1)
PROT SERPL-MCNC: 7.5 G/DL (ref 6–8.4)
RBC # BLD AUTO: 5.45 M/UL (ref 4.6–6.2)
SODIUM SERPL-SCNC: 142 MMOL/L (ref 136–145)
WBC # BLD AUTO: 4.5 K/UL (ref 3.9–12.7)

## 2019-09-24 PROCEDURE — 85025 COMPLETE CBC W/AUTO DIFF WBC: CPT

## 2019-09-24 PROCEDURE — 99214 PR OFFICE/OUTPT VISIT, EST, LEVL IV, 30-39 MIN: ICD-10-PCS | Mod: S$GLB,,, | Performed by: INTERNAL MEDICINE

## 2019-09-24 PROCEDURE — 3080F PR MOST RECENT DIASTOLIC BLOOD PRESSURE >= 90 MM HG: ICD-10-PCS | Mod: S$GLB,,, | Performed by: INTERNAL MEDICINE

## 2019-09-24 PROCEDURE — 3077F SYST BP >= 140 MM HG: CPT | Mod: S$GLB,,, | Performed by: INTERNAL MEDICINE

## 2019-09-24 PROCEDURE — 3008F PR BODY MASS INDEX (BMI) DOCUMENTED: ICD-10-PCS | Mod: S$GLB,,, | Performed by: INTERNAL MEDICINE

## 2019-09-24 PROCEDURE — 3080F DIAST BP >= 90 MM HG: CPT | Mod: S$GLB,,, | Performed by: INTERNAL MEDICINE

## 2019-09-24 PROCEDURE — 82378 CARCINOEMBRYONIC ANTIGEN: CPT

## 2019-09-24 PROCEDURE — 80053 COMPREHEN METABOLIC PANEL: CPT

## 2019-09-24 PROCEDURE — 36415 COLL VENOUS BLD VENIPUNCTURE: CPT

## 2019-09-24 PROCEDURE — 3008F BODY MASS INDEX DOCD: CPT | Mod: S$GLB,,, | Performed by: INTERNAL MEDICINE

## 2019-09-24 PROCEDURE — 3077F PR MOST RECENT SYSTOLIC BLOOD PRESSURE >= 140 MM HG: ICD-10-PCS | Mod: S$GLB,,, | Performed by: INTERNAL MEDICINE

## 2019-09-24 PROCEDURE — 99214 OFFICE O/P EST MOD 30 MIN: CPT | Mod: S$GLB,,, | Performed by: INTERNAL MEDICINE

## 2019-10-02 ENCOUNTER — OFFICE VISIT (OUTPATIENT)
Dept: PULMONOLOGY | Facility: CLINIC | Age: 54
End: 2019-10-02
Payer: COMMERCIAL

## 2019-10-02 VITALS
SYSTOLIC BLOOD PRESSURE: 140 MMHG | WEIGHT: 247 LBS | HEART RATE: 72 BPM | DIASTOLIC BLOOD PRESSURE: 80 MMHG | HEIGHT: 68 IN | BODY MASS INDEX: 37.44 KG/M2 | OXYGEN SATURATION: 98 %

## 2019-10-02 DIAGNOSIS — R91.8 MULTIPLE PULMONARY NODULES: Primary | ICD-10-CM

## 2019-10-02 PROCEDURE — 3008F PR BODY MASS INDEX (BMI) DOCUMENTED: ICD-10-PCS | Mod: S$GLB,,, | Performed by: INTERNAL MEDICINE

## 2019-10-02 PROCEDURE — 99213 OFFICE O/P EST LOW 20 MIN: CPT | Mod: S$GLB,,, | Performed by: INTERNAL MEDICINE

## 2019-10-02 PROCEDURE — 3079F PR MOST RECENT DIASTOLIC BLOOD PRESSURE 80-89 MM HG: ICD-10-PCS | Mod: S$GLB,,, | Performed by: INTERNAL MEDICINE

## 2019-10-02 PROCEDURE — 3077F SYST BP >= 140 MM HG: CPT | Mod: S$GLB,,, | Performed by: INTERNAL MEDICINE

## 2019-10-02 PROCEDURE — 3079F DIAST BP 80-89 MM HG: CPT | Mod: S$GLB,,, | Performed by: INTERNAL MEDICINE

## 2019-10-02 PROCEDURE — 99213 PR OFFICE/OUTPT VISIT, EST, LEVL III, 20-29 MIN: ICD-10-PCS | Mod: S$GLB,,, | Performed by: INTERNAL MEDICINE

## 2019-10-02 PROCEDURE — 3077F PR MOST RECENT SYSTOLIC BLOOD PRESSURE >= 140 MM HG: ICD-10-PCS | Mod: S$GLB,,, | Performed by: INTERNAL MEDICINE

## 2019-10-02 PROCEDURE — 3008F BODY MASS INDEX DOCD: CPT | Mod: S$GLB,,, | Performed by: INTERNAL MEDICINE

## 2019-10-02 NOTE — PROGRESS NOTES
Office Visit    Patient Name: Vince Kwon  MRN: [id number]  : 1965      Reason for visit: Pulmonary nodules    HPI:     2018 - Pt had surgery for colon cancer in 3/2018 - has not needed any chemotherapy (sees Dr Welch), had PET scan showing a few small pulmonary nodules.  Ct scan done 2018 (3 months later) was stable, here for evaluation.  He was a smoker (1 pack per week for 3 years).    2019 - Here for follow up, most recent PET scan was good (see below).  No new complaints.    10/2/2019 - Here for follow up, doing well, no symptoms.  To get repeat CT scans (ordered by Dr Welch).    Past Medical History    Past Medical History:   Diagnosis Date    Colon tumor     Hyperlipidemia     Hypertension     Hypokalemia 3/1/2018    Multiple pulmonary nodules 8/10/2018       Past Surgical History    Past Surgical History:   Procedure Laterality Date    COLON SURGERY  2018    COLONOSCOPY      FINGER SURGERY      RIGHT COLECTOMY Right 2018           Medications      Current Outpatient Medications:     amLODIPine (NORVASC) 10 MG tablet, Take 1 tablet (10 mg total) by mouth once daily., Disp: 90 tablet, Rfl: 1    aspirin (ECOTRIN) 81 MG EC tablet, Take 81 mg by mouth., Disp: , Rfl:     atorvastatin (LIPITOR) 20 MG tablet, Take 1 tablet (20 mg total) by mouth once daily. Take one tablet daily., Disp: 90 tablet, Rfl: 1    azilsartan med-chlorthalidone (EDARBYCLOR) 40-12.5 mg Tab, Take 1 tablet by mouth once daily., Disp: 90 tablet, Rfl: 1    labetalol (NORMODYNE) 300 MG tablet, Take 1 tablet (300 mg total) by mouth 2 (two) times daily., Disp: 180 tablet, Rfl: 1    omeprazole (PRILOSEC) 20 MG capsule, Take 20 mg by mouth once daily., Disp: , Rfl:     potassium chloride SA (K-DUR,KLOR-CON) 20 MEQ tablet, Take 1 tablet (20 mEq total) by mouth 3 (three) times daily., Disp: 90 tablet, Rfl: 3    Allergies    Review of patient's allergies indicates:  No Known  "Allergies    SocHx    Social History     Tobacco Use   Smoking Status Former Smoker   Smokeless Tobacco Never Used       Social History     Substance and Sexual Activity   Alcohol Use Yes    Comment: occ       Drug Use - no  Occupation - works in warehouse  Asbestos exposure - no  Pets - no    FMHx    Family History   Problem Relation Age of Onset    Diabetes Mother     Heart disease Mother     Diabetes Sister     Breast cancer Sister     Diabetes Maternal Aunt     Diabetes Maternal Grandmother     Heart disease Maternal Grandmother     Heart disease Maternal Grandfather          Review of Systems  Review of Systems   Constitutional: Negative for chills, diaphoresis, fever, malaise/fatigue and weight loss.   HENT: Negative for congestion.    Eyes: Negative for pain.   Respiratory: Negative for cough, hemoptysis, sputum production, shortness of breath, wheezing and stridor.    Cardiovascular: Negative for chest pain, palpitations, orthopnea, claudication, leg swelling and PND.   Gastrointestinal: Negative for abdominal pain, constipation, diarrhea, heartburn, nausea and vomiting.   Genitourinary: Negative for dysuria, frequency and urgency.   Musculoskeletal: Negative for falls and myalgias.   Neurological: Negative for sensory change, focal weakness and weakness.   Psychiatric/Behavioral: Negative for depression. The patient is not nervous/anxious.        Physical Exam    Vitals:    10/02/19 1040   BP: (!) 140/80   Pulse: 72   SpO2: 98%   Weight: 112 kg (247 lb)   Height: 5' 8" (1.727 m)       Physical Exam   Constitutional: He is oriented to person, place, and time. He appears well-developed and well-nourished. No distress.   overweight   HENT:   Head: Normocephalic and atraumatic.   Right Ear: External ear normal.   Left Ear: External ear normal.   Nose: Nose normal.   Mouth/Throat: Oropharynx is clear and moist.   Eyes: Pupils are equal, round, and reactive to light. EOM are normal.   Neck: Normal range " of motion. Neck supple. No JVD present. No tracheal deviation present. No thyromegaly present.   Cardiovascular: Normal rate, regular rhythm, normal heart sounds and intact distal pulses. Exam reveals no gallop and no friction rub.   No murmur heard.  Pulmonary/Chest: Effort normal and breath sounds normal. No stridor. No respiratory distress. He has no wheezes. He has no rales. He exhibits no tenderness.   Abdominal: Soft. Bowel sounds are normal. He exhibits no distension.   Musculoskeletal: Normal range of motion. He exhibits no edema or tenderness.   Lymphadenopathy:     He has no cervical adenopathy.   Neurological: He is alert and oriented to person, place, and time. He has normal reflexes. No cranial nerve deficit.   Skin: He is not diaphoretic.   Psychiatric: He has a normal mood and affect. His behavior is normal.   Nursing note and vitals reviewed.      Labs    Lab Results   Component Value Date    WBC 4.50 09/24/2019    HGB 15.0 09/24/2019    HCT 46.4 09/24/2019     09/24/2019       Sodium   Date Value Ref Range Status   09/24/2019 142 136 - 145 mmol/L Final   03/15/2019 139 134 - 144 mmol/L      Potassium   Date Value Ref Range Status   09/24/2019 3.2 (L) 3.5 - 5.1 mmol/L Final     Chloride   Date Value Ref Range Status   09/24/2019 102 95 - 110 mmol/L Final   03/15/2019 100 98 - 110 mmol/L      CO2   Date Value Ref Range Status   09/24/2019 30 (H) 23 - 29 mmol/L Final     Glucose   Date Value Ref Range Status   09/24/2019 119 (H) 70 - 110 mg/dL Final   03/15/2019 101 (H) 70 - 99 mg/dL      BUN, Bld   Date Value Ref Range Status   09/24/2019 16 6 - 20 mg/dL Final     Creatinine   Date Value Ref Range Status   09/24/2019 1.4 0.5 - 1.4 mg/dL Final   03/15/2019 1.24 0.60 - 1.40 mg/dL      Calcium   Date Value Ref Range Status   09/24/2019 9.4 8.7 - 10.5 mg/dL Final     Total Protein   Date Value Ref Range Status   09/24/2019 7.5 6.0 - 8.4 g/dL Final     Albumin   Date Value Ref Range Status    09/24/2019 4.1 3.5 - 5.2 g/dL Final   03/15/2019 4.3 3.1 - 4.7 g/dL      Total Bilirubin   Date Value Ref Range Status   09/24/2019 1.0 0.1 - 1.0 mg/dL Final     Comment:     For infants and newborns, interpretation of results should be based  on gestational age, weight and in agreement with clinical  observations.  Premature Infant recommended reference ranges:  Up to 24 hours.............<8.0 mg/dL  Up to 48 hours............<12.0 mg/dL  3-5 days..................<15.0 mg/dL  6-29 days.................<15.0 mg/dL       Alkaline Phosphatase   Date Value Ref Range Status   09/24/2019 68 55 - 135 U/L Final     AST   Date Value Ref Range Status   09/24/2019 62 (H) 10 - 40 U/L Final     ALT   Date Value Ref Range Status   09/24/2019 40 10 - 44 U/L Final     Anion Gap   Date Value Ref Range Status   09/24/2019 10 8 - 16 mmol/L Final       Xrays    PET CT WITH IMAGE FUSION    HISTORY:  Lung field abnormal . Colon cancer status post resection. Recent  abnormal CT revealing enlarged lymph nodes.    TECHNIQUE: Following IV administration of 12.7 mCi of F-18 labeled FDG into  right antecubital fossa and a 60 minute delay, PET CT was performed from the  vertex of the skull through the proximal thighs with an integrated PET CT  scanner with image fusion. CT images were obtained to aid in attenuation  correction and PET localization.  The patient's serum glucose at the time of the exam was   108  mg/dL.    COMPARISON: CT abdomen and pelvis 02/01/2019. PET/CT 04/11/2018    FINDINGS: Images through the brain demonstrate no acute intracranial process or  large intra-axial lesion. Mastoid air cells are clear. Small mucous retention  cyst or polyp along the posterior aspect of left maxillary sinus.    No pathologically enlarged or hypermetabolic lymph nodes within the neck.    No FDG avid pulmonary nodule or mass. Stable 3 mm right middle lobe pulmonary  nodule on image 112. No pathologically enlarged or hypermetabolic  mediastinal,  hilar, or axillary lymph nodes.    No focal hepatic lesion. Gallbladder and biliary tree are unremarkable. Spleen,  pancreas, adrenal glands, kidneys, ureters, and urinary bladder are  unremarkable. Patient is status post partial resection of ascending colon.  Colon is otherwise unremarkable.    No pathologically enlarged or hypermetabolic lymph nodes within the abdomen or  pelvis.    Greater than expected FDG uptake within the spine could be on the basis of red  marrow conversion. No aggressive osseous abnormality.    IMPRESSION:    No convincing PET/CT evidence of metastatic disease.    Read and electronically signed by: Shubham Marnio MD on 3/8/2019 11:33 AM CST      SHUBHAM MARINO MD    Impression/Plan    Problem List Items Addressed This Visit        Pulmonary    Multiple pulmonary nodules - Primary  - small and not likely significant, stable by PET scan  - repeat scan ordered  - if stable would repeat in 1 year                Rudi Crockett MD

## 2019-11-22 ENCOUNTER — TELEPHONE (OUTPATIENT)
Dept: FAMILY MEDICINE | Facility: CLINIC | Age: 54
End: 2019-11-22

## 2019-11-22 ENCOUNTER — LAB VISIT (OUTPATIENT)
Dept: LAB | Facility: HOSPITAL | Age: 54
End: 2019-11-22
Attending: INTERNAL MEDICINE
Payer: COMMERCIAL

## 2019-11-22 ENCOUNTER — OFFICE VISIT (OUTPATIENT)
Dept: FAMILY MEDICINE | Facility: CLINIC | Age: 54
End: 2019-11-22
Payer: COMMERCIAL

## 2019-11-22 VITALS
HEIGHT: 68 IN | HEART RATE: 72 BPM | OXYGEN SATURATION: 95 % | DIASTOLIC BLOOD PRESSURE: 70 MMHG | WEIGHT: 251 LBS | TEMPERATURE: 98 F | BODY MASS INDEX: 38.04 KG/M2 | SYSTOLIC BLOOD PRESSURE: 116 MMHG

## 2019-11-22 DIAGNOSIS — E87.6 HYPOKALEMIA: ICD-10-CM

## 2019-11-22 DIAGNOSIS — I10 ESSENTIAL HYPERTENSION: ICD-10-CM

## 2019-11-22 DIAGNOSIS — I10 ESSENTIAL HYPERTENSION: Primary | ICD-10-CM

## 2019-11-22 DIAGNOSIS — E78.00 PURE HYPERCHOLESTEROLEMIA: ICD-10-CM

## 2019-11-22 LAB
ALBUMIN SERPL BCP-MCNC: 4.2 G/DL (ref 3.5–5.2)
ALP SERPL-CCNC: 62 U/L (ref 55–135)
ALT SERPL W/O P-5'-P-CCNC: 28 U/L (ref 10–44)
ANION GAP SERPL CALC-SCNC: 8 MMOL/L (ref 8–16)
AST SERPL-CCNC: 35 U/L (ref 10–40)
BILIRUB SERPL-MCNC: 1.5 MG/DL (ref 0.1–1)
BILIRUB UR QL STRIP: NEGATIVE
BUN SERPL-MCNC: 16 MG/DL (ref 6–20)
CALCIUM SERPL-MCNC: 9.2 MG/DL (ref 8.7–10.5)
CHLORIDE SERPL-SCNC: 100 MMOL/L (ref 95–110)
CHOLEST SERPL-MCNC: 220 MG/DL (ref 120–199)
CHOLEST/HDLC SERPL: 4.5 {RATIO} (ref 2–5)
CLARITY UR: CLEAR
CO2 SERPL-SCNC: 32 MMOL/L (ref 23–29)
COLOR UR: YELLOW
CREAT SERPL-MCNC: 1.3 MG/DL (ref 0.5–1.4)
EST. GFR  (AFRICAN AMERICAN): >60 ML/MIN/1.73 M^2
EST. GFR  (NON AFRICAN AMERICAN): >60 ML/MIN/1.73 M^2
GLUCOSE SERPL-MCNC: 107 MG/DL (ref 70–110)
GLUCOSE UR QL STRIP: NEGATIVE
HDLC SERPL-MCNC: 49 MG/DL (ref 40–75)
HDLC SERPL: 22.3 % (ref 20–50)
HGB UR QL STRIP: NEGATIVE
KETONES UR QL STRIP: NEGATIVE
LDLC SERPL CALC-MCNC: 153.2 MG/DL (ref 63–159)
LEUKOCYTE ESTERASE UR QL STRIP: NEGATIVE
NITRITE UR QL STRIP: NEGATIVE
NONHDLC SERPL-MCNC: 171 MG/DL
PH UR STRIP: 7 [PH] (ref 5–8)
POTASSIUM SERPL-SCNC: 2.8 MMOL/L (ref 3.5–5.1)
PROT SERPL-MCNC: 7.2 G/DL (ref 6–8.4)
PROT UR QL STRIP: NEGATIVE
SODIUM SERPL-SCNC: 140 MMOL/L (ref 136–145)
SP GR UR STRIP: 1.01 (ref 1–1.03)
TRIGL SERPL-MCNC: 89 MG/DL (ref 30–150)
URN SPEC COLLECT METH UR: NORMAL
UROBILINOGEN UR STRIP-ACNC: NEGATIVE EU/DL

## 2019-11-22 PROCEDURE — 3074F PR MOST RECENT SYSTOLIC BLOOD PRESSURE < 130 MM HG: ICD-10-PCS | Mod: S$GLB,,, | Performed by: INTERNAL MEDICINE

## 2019-11-22 PROCEDURE — 99213 OFFICE O/P EST LOW 20 MIN: CPT | Mod: S$GLB,,, | Performed by: INTERNAL MEDICINE

## 2019-11-22 PROCEDURE — 3078F PR MOST RECENT DIASTOLIC BLOOD PRESSURE < 80 MM HG: ICD-10-PCS | Mod: S$GLB,,, | Performed by: INTERNAL MEDICINE

## 2019-11-22 PROCEDURE — 36415 COLL VENOUS BLD VENIPUNCTURE: CPT

## 2019-11-22 PROCEDURE — 99213 PR OFFICE/OUTPT VISIT, EST, LEVL III, 20-29 MIN: ICD-10-PCS | Mod: S$GLB,,, | Performed by: INTERNAL MEDICINE

## 2019-11-22 PROCEDURE — 80053 COMPREHEN METABOLIC PANEL: CPT

## 2019-11-22 PROCEDURE — 81003 URINALYSIS AUTO W/O SCOPE: CPT

## 2019-11-22 PROCEDURE — 3078F DIAST BP <80 MM HG: CPT | Mod: S$GLB,,, | Performed by: INTERNAL MEDICINE

## 2019-11-22 PROCEDURE — 80061 LIPID PANEL: CPT

## 2019-11-22 PROCEDURE — 3008F PR BODY MASS INDEX (BMI) DOCUMENTED: ICD-10-PCS | Mod: S$GLB,,, | Performed by: INTERNAL MEDICINE

## 2019-11-22 PROCEDURE — 3008F BODY MASS INDEX DOCD: CPT | Mod: S$GLB,,, | Performed by: INTERNAL MEDICINE

## 2019-11-22 PROCEDURE — 3074F SYST BP LT 130 MM HG: CPT | Mod: S$GLB,,, | Performed by: INTERNAL MEDICINE

## 2019-11-22 RX ORDER — LABETALOL 300 MG/1
300 TABLET, FILM COATED ORAL 2 TIMES DAILY
Qty: 180 TABLET | Refills: 1 | Status: SHIPPED | OUTPATIENT
Start: 2019-11-22 | End: 2020-05-01 | Stop reason: SDUPTHER

## 2019-11-22 RX ORDER — POTASSIUM CHLORIDE 20 MEQ/1
40 TABLET, EXTENDED RELEASE ORAL 2 TIMES DAILY
Qty: 120 TABLET | Refills: 3 | Status: SHIPPED | OUTPATIENT
Start: 2019-11-22 | End: 2020-05-01 | Stop reason: SDUPTHER

## 2019-11-22 RX ORDER — ATORVASTATIN CALCIUM 20 MG/1
20 TABLET, FILM COATED ORAL DAILY
Qty: 90 TABLET | Refills: 1 | Status: SHIPPED | OUTPATIENT
Start: 2019-11-22 | End: 2020-05-01 | Stop reason: SDUPTHER

## 2019-11-22 RX ORDER — AMLODIPINE BESYLATE 10 MG/1
10 TABLET ORAL DAILY
Qty: 90 TABLET | Refills: 1 | Status: SHIPPED | OUTPATIENT
Start: 2019-11-22 | End: 2020-05-01 | Stop reason: SDUPTHER

## 2019-11-22 RX ORDER — POTASSIUM CHLORIDE 20 MEQ/1
20 TABLET, EXTENDED RELEASE ORAL 3 TIMES DAILY
Qty: 90 TABLET | Refills: 3 | Status: SHIPPED | OUTPATIENT
Start: 2019-11-22 | End: 2019-11-22 | Stop reason: SDUPTHER

## 2019-11-22 NOTE — TELEPHONE ENCOUNTER
----- Message from Jose Hoang Jr., MD sent at 11/22/2019 11:34 AM CST -----  I have reviewed your results.  They demonstrate no abnormal findings.  If you have any additional concerns regarding these tests, please contact me at your convenience.

## 2019-11-22 NOTE — TELEPHONE ENCOUNTER
----- Message from Jose Hoang Jr., MD sent at 11/22/2019 11:40 AM CST -----  Please call the patient regarding his abnormal result.  Tell him to change his potassium to 2 BID instead of 1 TID

## 2019-11-22 NOTE — PROGRESS NOTES
Subjective:       Patient ID: Vince Kwon is a 54 y.o. male.    Chief Complaint: Hypertension (continuity of care) and Hyperlipidemia    Hypertension   This is a chronic problem. The problem is controlled. Pertinent negatives include no chest pain, headaches, neck pain, palpitations, peripheral edema or shortness of breath. Past treatments include beta blockers, calcium channel blockers, diuretics and angiotensin blockers. The current treatment provides significant improvement. There are no compliance problems.      Review of Systems   Constitutional: Negative for chills, fatigue, fever and unexpected weight change.   HENT: Negative for congestion, hearing loss, postnasal drip, rhinorrhea, trouble swallowing and voice change.    Eyes: Negative for photophobia and visual disturbance.   Respiratory: Negative for apnea, cough, choking, chest tightness, shortness of breath and wheezing.    Cardiovascular: Negative for chest pain, palpitations and leg swelling.   Gastrointestinal: Negative for abdominal pain, blood in stool, constipation, diarrhea, nausea, rectal pain and vomiting.   Endocrine: Negative for cold intolerance, heat intolerance, polydipsia and polyuria.   Genitourinary: Negative for decreased urine volume, difficulty urinating, discharge, dysuria, flank pain, frequency, genital sores, hematuria, testicular pain and urgency.   Musculoskeletal: Negative for arthralgias, back pain, gait problem, joint swelling, myalgias and neck pain.   Skin: Negative for color change, rash and wound.   Allergic/Immunologic: Negative for environmental allergies and food allergies.   Neurological: Negative for dizziness, tremors, seizures, syncope, facial asymmetry, speech difficulty, weakness, light-headedness, numbness and headaches.   Hematological: Negative for adenopathy. Does not bruise/bleed easily.   Psychiatric/Behavioral: Negative for confusion, hallucinations, sleep disturbance and suicidal ideas. The  patient is not nervous/anxious.        Past Medical History:   Diagnosis Date    Colon tumor     Hyperlipidemia     Hypertension     Hypokalemia 3/1/2018    Multiple pulmonary nodules 8/10/2018      Past Surgical History:   Procedure Laterality Date    COLON SURGERY  03/07/2018    COLONOSCOPY      FINGER SURGERY      RIGHT COLECTOMY Right 03/07/2018           Family History   Problem Relation Age of Onset    Diabetes Mother     Heart disease Mother     Diabetes Sister     Breast cancer Sister     Diabetes Maternal Aunt     Diabetes Maternal Grandmother     Heart disease Maternal Grandmother     Heart disease Maternal Grandfather        Social History     Socioeconomic History    Marital status: Single     Spouse name: Not on file    Number of children: Not on file    Years of education: Not on file    Highest education level: Not on file   Occupational History    Occupation: shipping and recieving   Social Needs    Financial resource strain: Not on file    Food insecurity:     Worry: Not on file     Inability: Not on file    Transportation needs:     Medical: Not on file     Non-medical: Not on file   Tobacco Use    Smoking status: Former Smoker    Smokeless tobacco: Never Used   Substance and Sexual Activity    Alcohol use: Yes     Comment: occ    Drug use: No    Sexual activity: Yes     Partners: Female   Lifestyle    Physical activity:     Days per week: Not on file     Minutes per session: Not on file    Stress: Not at all   Relationships    Social connections:     Talks on phone: Not on file     Gets together: Not on file     Attends [employer] service: Not on file     Active member of club or organization: Not on file     Attends meetings of clubs or organizations: Not on file     Relationship status: Not on file   Other Topics Concern    Not on file   Social History Narrative    Lives alone       Current Outpatient Medications   Medication Sig Dispense Refill     "amLODIPine (NORVASC) 10 MG tablet Take 1 tablet (10 mg total) by mouth once daily. 90 tablet 1    aspirin (ECOTRIN) 81 MG EC tablet Take 81 mg by mouth.      atorvastatin (LIPITOR) 20 MG tablet Take 1 tablet (20 mg total) by mouth once daily. Take one tablet daily. 90 tablet 1    azilsartan med-chlorthalidone (EDARBYCLOR) 40-12.5 mg Tab Take 1 tablet by mouth once daily. 90 tablet 1    labetalol (NORMODYNE) 300 MG tablet Take 1 tablet (300 mg total) by mouth 2 (two) times daily. 180 tablet 1    potassium chloride SA (K-DUR,KLOR-CON) 20 MEQ tablet Take 2 tablets (40 mEq total) by mouth 2 (two) times daily. 120 tablet 3    omeprazole (PRILOSEC) 20 MG capsule Take 20 mg by mouth once daily.       No current facility-administered medications for this visit.        Review of patient's allergies indicates:  No Known Allergies  Objective:    HPI     Hypertension      Additional comments: continuity of care          Last edited by Jose R Willis MA on 11/22/2019  8:34 AM. (History)      Blood pressure 116/70, pulse 72, temperature 97.9 °F (36.6 °C), temperature source Temporal, height 5' 8" (1.727 m), weight 113.9 kg (251 lb), SpO2 95 %. Body mass index is 38.16 kg/m².   Physical Exam   Constitutional: He appears well-developed. No distress.   Obese     HENT:   Nose: Nose normal.   Mouth/Throat: Oropharynx is clear and moist.   Eyes: Conjunctivae are normal. Right eye exhibits no discharge. Left eye exhibits no discharge. No scleral icterus.   Neck: Carotid bruit is not present.   Cardiovascular: Normal rate, regular rhythm and normal heart sounds.   No murmur heard.  Pulmonary/Chest: Effort normal and breath sounds normal. No respiratory distress. He has no decreased breath sounds. He has no wheezes. He has no rhonchi. He has no rales.   Abdominal: Soft. He exhibits no distension. There is no tenderness. There is no rebound and no guarding.   Musculoskeletal: He exhibits no edema.   Neurological: He is alert. He " displays no tremor.   Skin: Skin is warm and dry.   Psychiatric: He has a normal mood and affect. His speech is normal.   Nursing note and vitals reviewed.          Assessment:       1. Essential hypertension    2. Hypokalemia    3. Pure hypercholesterolemia        Plan:       Vince was seen today for hypertension and hyperlipidemia.    Diagnoses and all orders for this visit:    Essential hypertension  -     amLODIPine (NORVASC) 10 MG tablet; Take 1 tablet (10 mg total) by mouth once daily.  -     azilsartan med-chlorthalidone (EDARBYCLOR) 40-12.5 mg Tab; Take 1 tablet by mouth once daily.  -     labetalol (NORMODYNE) 300 MG tablet; Take 1 tablet (300 mg total) by mouth 2 (two) times daily.  -     Comprehensive metabolic panel; Future  -     Lipid panel; Future  -     Urinalysis; Future    Hypokalemia  Comments:  Continues to have issues with low potassium.  Increase KCl  Orders:  -     Discontinue: potassium chloride SA (K-DUR,KLOR-CON) 20 MEQ tablet; Take 1 tablet (20 mEq total) by mouth 3 (three) times daily.  -     Comprehensive metabolic panel; Future  -     potassium chloride SA (K-DUR,KLOR-CON) 20 MEQ tablet; Take 2 tablets (40 mEq total) by mouth 2 (two) times daily.    Pure hypercholesterolemia  -     atorvastatin (LIPITOR) 20 MG tablet; Take 1 tablet (20 mg total) by mouth once daily. Take one tablet daily.  -     Comprehensive metabolic panel; Future  -     Lipid panel; Future

## 2019-11-25 ENCOUNTER — TELEPHONE (OUTPATIENT)
Dept: FAMILY MEDICINE | Facility: CLINIC | Age: 54
End: 2019-11-25

## 2020-03-27 ENCOUNTER — TELEPHONE (OUTPATIENT)
Dept: HEMATOLOGY/ONCOLOGY | Facility: CLINIC | Age: 55
End: 2020-03-27

## 2020-03-27 ENCOUNTER — HOSPITAL ENCOUNTER (OUTPATIENT)
Dept: RADIOLOGY | Facility: HOSPITAL | Age: 55
Discharge: HOME | End: 2020-03-27
Attending: INTERNAL MEDICINE
Payer: COMMERCIAL

## 2020-03-27 DIAGNOSIS — C18.2 PRIMARY ADENOCARCINOMA OF ASCENDING COLON: ICD-10-CM

## 2020-03-27 DIAGNOSIS — R91.8 LUNG NODULES: ICD-10-CM

## 2020-03-27 LAB
CREAT SERPL-MCNC: 1.4 MG/DL (ref 0.5–1.4)
SAMPLE: NORMAL

## 2020-03-27 PROCEDURE — 71260 CT THORAX DX C+: CPT | Mod: TC,PO

## 2020-03-27 PROCEDURE — 25500020 PHARM REV CODE 255: Mod: PO | Performed by: INTERNAL MEDICINE

## 2020-03-27 RX ADMIN — IOHEXOL 100 ML: 350 INJECTION, SOLUTION INTRAVENOUS at 08:03

## 2020-03-27 NOTE — TELEPHONE ENCOUNTER
Attempted to call patient to let him know of good scan results. Left a message for him to return my call.

## 2020-03-30 ENCOUNTER — TELEPHONE (OUTPATIENT)
Dept: HEMATOLOGY/ONCOLOGY | Facility: CLINIC | Age: 55
End: 2020-03-30

## 2020-03-30 NOTE — PROGRESS NOTES
[facility] Hematology/Oncology  PROGRESS NOTE - Telemedicine Visit       Subjective:       Patient ID:   NAME: Vince Kwon : 1965     54 y.o. male    Referring Doc: Ugo Johnson  Other Physicians: Natalya Gutierrez    Chief Complaint:  Colon ca f/u    History of Present Illness:     Patient is being seen today via a telemedicine follow-up visit in lieu of a normal in-person visit due to the recent COVID19 outbreak. The patient is currently located at home. This visit type is a virtual visit with synchronous audio and/or video.    The patient is here by himself. He is doing ok with no new issues.  Bowel movements have been normal. He denies any CP, SOB, HA's or N/V.     He last  saw Dr Crockett with pulmonary on 10/2/2019 and he saw Dr Hoang on 2019.     He recent CT scan but they only did the chest and not abdom/pelvis         .         ROS:   GEN: normal without any fever, night sweats or weight loss  HEENT: normal with no HA's, sore throat, stiff neck, changes in vision  CV: normal with no CP, SOB, PND, LIGHT or orthopnea  PULM: normal with no SOB, cough, hemoptysis, sputum or pleuritic pain  GI: normal with no abdominal pain, nausea, vomiting, constipation, diarrhea, melanotic stools, BRBPR, or hematemesis  : normal with no hematuria, dysuria  BREAST: normal with no mass, discharge, pain  SKIN: normal with no rash, erythema, bruising, or swelling    Allergies:  Review of patient's allergies indicates:  No Known Allergies    Medications:    Current Outpatient Medications:     amLODIPine (NORVASC) 10 MG tablet, Take 1 tablet (10 mg total) by mouth once daily., Disp: 90 tablet, Rfl: 1    aspirin (ECOTRIN) 81 MG EC tablet, Take 81 mg by mouth., Disp: , Rfl:     atorvastatin (LIPITOR) 20 MG tablet, Take 1 tablet (20 mg total) by mouth once daily. Take one tablet daily., Disp: 90 tablet, Rfl: 1    azilsartan med-chlorthalidone (EDARBYCLOR) 40-12.5 mg Tab, Take 1 tablet by mouth once daily.,  Disp: 90 tablet, Rfl: 1    labetalol (NORMODYNE) 300 MG tablet, Take 1 tablet (300 mg total) by mouth 2 (two) times daily., Disp: 180 tablet, Rfl: 1    omeprazole (PRILOSEC) 20 MG capsule, Take 20 mg by mouth once daily., Disp: , Rfl:     potassium chloride SA (K-DUR,KLOR-CON) 20 MEQ tablet, Take 2 tablets (40 mEq total) by mouth 2 (two) times daily., Disp: 120 tablet, Rfl: 3    PMHx/PSHx Updates:  See patient's last visit with me on 9/24/2019.  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    THE TUMOR PENETRATES THE MUSCULARIS PROPRIA AND INVADES THE  PERICOLONIC FAT.    THE PROXIMAL (ILEAL) AND DISTAL (COLONIC) SURGICAL MARGINS OF  RESECTION ARE FREE OF    MALIGNANCY.    A TOTAL OF TWENTY-SEVEN (27) PERICOLONIC LYMPH NODES ARE FREE OF  METASTATIC TUMOR.    APPENDIX: REACTIVE MUCOSAL LYMPHOID HYPERPLASIA.     NO EVIDENCE OF METASTATIC CARCINOMA.    Procedure:  Right hemicolectomy  Specimen Length (if applicable):  <CR-*Specimen Length (if applicable):     12.0 cm>  Tumor Site:  Cecum  Tumor Location  Tumor Size:  Greatest dimension:  4.5 cm  Macroscopic Tumor Perforation:  Not identified  Macroscopic Intactness of Mesorectum:  Histologic Type:  Adenocarcinoma, focal mucinous (colloid) carcinoma  component.  Histologic Grade:  Low grade (well differentiated to moderately  differentiated)  Histologic Features Suggestive of Microsatellite Instability:  Intratumoral Lymphocytic Response (tumor-infiltrating lymphocytes):  Peritumor Lymphocytic Response (Crohn-like response):  Tumor Subtype and Differentiation:  Microscopic Tumor Extension:  Tumor invades through the muscularis  propria into the subserosal adipose tissue or the nonperitonealized  pericolic or perirectal soft  tissues but does not extend to the serosal  surface  Margins    TNM Descriptors:  Primary Tumor (pT):  pT3:  Tumor invades through the muscularis propria  into pericolorectal tissues  Regional Lymph Nodes (pN):  pN0:  No regional lymph node metastasis  Distant Metastasis:  Not applicable.      Objective:     Vitals:  afebrile    Physical Examination:   GEN: no apparent distress, comfortable; AAOx3  HEAD: atraumatic and normocephalic  EYES: no conjunctival pallor or muddiness, no icterus; normal pupil reaction to ambient light  ENT: OMM, no pharyngeal erythema, external bilateral ears WNL; no visible thrush or ulcers  NECK: no masses or swelling, trachea midline, no visible LAD/LN's   CV: no palpitations; no pedal edema; no noticeable JVD or neck vein distension  CHEST: Normal respiratory effort; chest wall breath movements symmetrical; no audible wheezing  ABDOM: non-distended; no bloating  MUSC/Skeletal: ROM normal; joints visibly normal; no deformities or arthropathy  EXTREM: no clubbing, cyanosis, inflammation or swelling  SKIN: no rashes, lesions, ulcers, petechiae or subcutaneous nodules  : no escamilla  NEURO: moving all 4 extremities; AAOx3; no tremors  PSYCH: normal mood, affect and behavior  LYMPH: no visible LN's or LAD              Labs:     9/24/2019    Lab Results   Component Value Date    WBC 4.50 09/24/2019    HGB 15.0 09/24/2019    HCT 46.4 09/24/2019    MCV 85 09/24/2019     09/24/2019     BMP  Lab Results   Component Value Date     11/22/2019    K 2.8 (LL) 11/22/2019     11/22/2019    CO2 32 (H) 11/22/2019    BUN 16 11/22/2019    CREATININE 1.3 11/22/2019    CALCIUM 9.2 11/22/2019    ANIONGAP 8 11/22/2019    ESTGFRAFRICA >60.0 11/22/2019    EGFRNONAA >60.0 11/22/2019     Lab Results   Component Value Date    ALT 28 11/22/2019    AST 35 11/22/2019    ALKPHOS 62 11/22/2019    BILITOT 1.5 (H) 11/22/2019     Lab Results   Component Value Date    CEA 2.3 09/24/2019            Radiology/Diagnostic Studies:    CT chest scan  3/27/2020:    Impression       Stable soft tissue attenuation noncalcified pulmonary nodules with no new concerning pulmonary nodule or mass identified.  No findings to specifically suggest metastatic disease.           CT scans  9/20/2019:    Impression       No evidence of recurrence or metastatic disease    Stable tiny noncalcified nodules in the right lung           Ct scan 2/1/2019:    IMPRESSION:  1. Interval development of several prominent to borderline enlarged lymph nodes  within the central mesentery and right lower quadrant, nonspecific. These are  suspicious for metastatic disease, given lymphadenopathy seen on the patient's  original preoperative CT of 02/23/2018. Correlation with serum tumor markers,  and consideration for further evaluation with PET CT, are recommended.  2. No additional evidence for metastatic disease in the abdomen or the pelvis.          CT scans 7/27/2018:    IMPRESSION:    1. Stable appearance of subcentimeter right middle lobe and right upper lobe  pulmonary nodules.  2. Status post partial colonic resection with expected postoperative changes,  as discussed above. No residual mass or pathologically enlarged lymph nodes in  the abdomen or pelvis.  3. Incidental findings as above.            PET/CT: 4/11/2018    IMPRESSION:    1. Persistent mild wall thickening centered about site of ileocolonic  anastomosis with associated FDG uptake is most likely postoperative in nature  and due to residual inflammation. Residual malignancy cannot be excluded on the  basis of imaging alone, and correlation with surgical resection margins is  requested.    2. No current convincing evidence of metastatic disease.    3. 3 mm nodular density which is vague in superior right middle lobe is nonspecific. CT thorax without IV contrast follow-up is recommended within 3-6  months to simply document stability.          X-ray Chest Pa And  Lateral    Result Date: 2/28/2018  CHEST ROUT RAD, 2 VWS,FRNT/LAT XR Indication: 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 Comparison: None Findings: Cardiac silhouette size is normal. Lungs are clear without effusion. No pneumothorax. No acute osseous abnormality. IMPRESSION: No acute pulmonary process. Read and electronically signed by: Shubham Marino MD on 2/28/2018 6:22 PM CST SHUBHAM MARINO MD    Ct Abdomen Pelvis With Contrast    Result Date: 2/23/2018  CMS MANDATED QUALITY DATA - CT RADIATION ? 436 All CT scans at this facility utilize dose modulation, iterative reconstruction, and/or weight based dosing when appropriate to reduce radiation dose to as low as reasonably achievable. CLINICAL HISTORY: 52 years (1965) Male K63.9 TECHNIQUE: MDI ABDOMEN AND PELVIS W  CONTRAST CT. 296 images obtained. Axial CT images of the abdomen and pelvis were obtained from the dome of the diaphragm to the proximal thigh. CONTRAST: 100 mL of IV Omni 350 was administered uneventfully by IV. Oral contrast was also administered COMPARISON: None available. FINDINGS: Lower Thorax: The lung bases are clear. The heart is normal in size and there is no pericardial effusion. CT Abdomen: Liver: The liver is normal size and imaging appearance. Gallbladder: The gallbladder is unremarkable without acute cholecystitis. Biliary Tree: There is no intra or extrahepatic duct dilation. Spleen: The spleen is normal. Pancreas: The pancreas is normal. Adrenal Glands: The adrenal glands appear within normal limits. Kidneys: The kidneys are normal in imaging appearance without hydronephrosis or hydroureter. Vasculature: The aorta is normal in course and caliber. Lymph nodes: No abdominal lymphadenopathy is seen by size criteria. Intraperitoneal structures: There is no ascites. Bowel:  There is an apple core like lesion in the ascending colon extending over a length of approximately 5 cm (10 o'clock-8 o'clock position involving  "a majority of the circumference) the soft tissue mass measures approximately 2 cm in thickness. There are numerous small rounded lymph nodes in the cecal mesentery, none of which are enlarged by size criteria, however given the morphology and location these may be involved, for example a 15 x 7 mm node (image 121). Abdominal wall: The abdominal wall and musculature are normal. Musculoskeletal: There is a transitional lumbosacral vertebral body (S1) with a hypoplastic disc at S1-S2. There is moderate to severe disc height loss at L4-L5. No aggressive appearing lytic or blastic lesion is identified. CT Pelvis: Bladder: The urinary bladder is within normal limits. Reproductive Organs: The prostate and seminal vesicles are within normal limits. Pelvic Lymph nodes: No pelvic lymphadenopathy or pelvic mass is identified. IMPRESSION: 1. Apple-core like lesion in the descending colon most consistent with a primary colonic adenocarcinoma, there is no evidence of obstruction, pneumatosis intra-abdominal free air or abscess. 2. No lymphadenopathy by size criteria and no finding to specifically suggest metastatic disease in the abdomen or pelvis. Read and electronically signed by: Singh Pineda MD on 2/23/2018 9:53 AM CST SINGH PINEDA MD      I have reviewed all available lab results and radiology reports.    Assessment/Plan:   (1) 54 y.o. male with diagnosis of right colon mass found on recent colonoscopy on 2/16/2018 with Dr Johnson.  - he had been seen by Dr Gutierrez with [facility]   - s/p right colectomy with Dr Gutierrez on 3/7/2018  - 27 LN's were all negative  - T3 N0 and Stage IIA  - low grade adenocarcinoma  - PET scan on 4/11/2018 with no definitive evidence of ant metastatic process  - repeat CEA was 2.1  - will most likely not need chemotherapy if he remains a Stage II  - MMR was negative but all of all JESSICA was "stable" which has been shown to illicit a poorer prognosis in general but would not changes the " current plan of therapy (discussed with the patient in detail)  - prior CT scans on 7/27/2018 which appeared to be stable; however, repeat CT on 2/1/2019 showed some increase in LN's which needed to be addressed with PET scans but his insurance declined   - latest CT's on 9/20/2019 with no evidence of recurrent cancer  - last colonoscopy was good per patient with another one planned for 3 yrs       (2) HTN and hypercholesterolemia     (3) Prior mini-CVA - HTN related; no subsequent deficits; sounds like he had a TIA    (4) RML lung nodule on PEt at 3mm with no FDG uptake; CT stable;  - he last saw Dr Crockett with pulmonary on 4/2/2019  - latest Ct was stable on 9/20/2019 and again on 3/27/2020    1. Primary adenocarcinoma of ascending colon     2. Multiple pulmonary nodules           PLAN:  1.  Repeat scans in 6 months (PET); will check to see why insurance would not allow CT abdom  2. F/u with GI, PCP, Gen [facility], pulm etc  3. F/u with Dr Gutierrez, Dr Crockett, Dr Johnson, and PCP   4. Encouraged compliance with f/u with Pulm  5.  Check up to date labs incl CEA  6. RTC 6 months    Fax note to Jose Johnson Jr, MD, [facility]    Discussion:       Total Time spent on patient:    I spent over 25 mins of time with the patient. Reviewed results of the recently ordered labs, tests, reports and studies; made directives with regards to the results. Over half of this time was spent couseling and coordinating care, making treatment and analytical decisions; ordering necessary labs, tests and studies; and discussing treatment options and setting up treatment plan(s) if indicated.    COVID-19 Discussion:    I had long discussion with patient and any applicable family about the COVID-19 coronavirus epidemic and the recommended precautions with regard to cancer and/or hematology patients. I have re-iterated the CDC recommendations for adequate hand washing, use of hand -like products, and coughing  into elbow, etc. In addition, especially for our patients who are on chemotherapy and/or our otherwise immunocompromised patients, I have recommended avoidance of crowds, including movie theaters, restaurants, churches, etc. I have recommended avoidance of any sick or symptomatic family members and/or friends. I have also recommended avoidance of any raw and unwashed food products, and general avoidance of food items that have not been prepared by themselves. The patient has been asked to call us immediately with any symptom developments, issues, questions or other general concerns.       Telemedicine Statement:    The patient acknowledged and agreed to the audio/video encounter and the patient who is being provided medical services by telemedicine is:  (1) informed of the relationship between the physician and patient and the respective role of any other health care provider with respect to management of the patient; and (2) notified that he or she may decline to receive medical services by telemedicine and may withdraw from such care at any time.      I have explained all of the above in detail and the patient understands all of the current recommendation(s). I have answered all of their questions to the best of my ability and to their complete satisfaction.   The patient is to continue with the current management plan.            Electronically signed by Carlo Welch MD

## 2020-03-31 ENCOUNTER — OFFICE VISIT (OUTPATIENT)
Dept: HEMATOLOGY/ONCOLOGY | Facility: CLINIC | Age: 55
End: 2020-03-31
Payer: COMMERCIAL

## 2020-03-31 DIAGNOSIS — C18.2 PRIMARY ADENOCARCINOMA OF ASCENDING COLON: Primary | ICD-10-CM

## 2020-03-31 DIAGNOSIS — R91.8 MULTIPLE PULMONARY NODULES: ICD-10-CM

## 2020-03-31 PROCEDURE — 99214 OFFICE O/P EST MOD 30 MIN: CPT | Mod: 95,,, | Performed by: INTERNAL MEDICINE

## 2020-03-31 PROCEDURE — 99214 PR OFFICE/OUTPT VISIT, EST, LEVL IV, 30-39 MIN: ICD-10-PCS | Mod: 95,,, | Performed by: INTERNAL MEDICINE

## 2020-04-22 DIAGNOSIS — I10 ESSENTIAL HYPERTENSION: ICD-10-CM

## 2020-05-01 ENCOUNTER — OFFICE VISIT (OUTPATIENT)
Dept: FAMILY MEDICINE | Facility: CLINIC | Age: 55
End: 2020-05-01
Payer: COMMERCIAL

## 2020-05-01 ENCOUNTER — LAB VISIT (OUTPATIENT)
Dept: LAB | Facility: HOSPITAL | Age: 55
End: 2020-05-01
Attending: INTERNAL MEDICINE
Payer: COMMERCIAL

## 2020-05-01 VITALS
TEMPERATURE: 99 F | HEART RATE: 81 BPM | BODY MASS INDEX: 37.89 KG/M2 | HEIGHT: 68 IN | WEIGHT: 250 LBS | DIASTOLIC BLOOD PRESSURE: 82 MMHG | OXYGEN SATURATION: 97 % | SYSTOLIC BLOOD PRESSURE: 140 MMHG

## 2020-05-01 DIAGNOSIS — E87.6 HYPOKALEMIA: ICD-10-CM

## 2020-05-01 DIAGNOSIS — I10 ESSENTIAL HYPERTENSION: ICD-10-CM

## 2020-05-01 DIAGNOSIS — E78.00 PURE HYPERCHOLESTEROLEMIA: ICD-10-CM

## 2020-05-01 LAB
ALBUMIN SERPL BCP-MCNC: 4.2 G/DL (ref 3.5–5.2)
ALP SERPL-CCNC: 71 U/L (ref 55–135)
ALT SERPL W/O P-5'-P-CCNC: 23 U/L (ref 10–44)
ANION GAP SERPL CALC-SCNC: 8 MMOL/L (ref 8–16)
AST SERPL-CCNC: 25 U/L (ref 10–40)
BILIRUB SERPL-MCNC: 1.7 MG/DL (ref 0.1–1)
BUN SERPL-MCNC: 20 MG/DL (ref 6–20)
CALCIUM SERPL-MCNC: 9.6 MG/DL (ref 8.7–10.5)
CHLORIDE SERPL-SCNC: 101 MMOL/L (ref 95–110)
CHOLEST SERPL-MCNC: 214 MG/DL (ref 120–199)
CHOLEST/HDLC SERPL: 3.9 {RATIO} (ref 2–5)
CO2 SERPL-SCNC: 31 MMOL/L (ref 23–29)
CREAT SERPL-MCNC: 1.4 MG/DL (ref 0.5–1.4)
EST. GFR  (AFRICAN AMERICAN): >60 ML/MIN/1.73 M^2
EST. GFR  (NON AFRICAN AMERICAN): 55.8 ML/MIN/1.73 M^2
GLUCOSE SERPL-MCNC: 102 MG/DL (ref 70–110)
HDLC SERPL-MCNC: 55 MG/DL (ref 40–75)
HDLC SERPL: 25.7 % (ref 20–50)
LDLC SERPL CALC-MCNC: 143 MG/DL (ref 63–159)
NONHDLC SERPL-MCNC: 159 MG/DL
POTASSIUM SERPL-SCNC: 3.4 MMOL/L (ref 3.5–5.1)
PROT SERPL-MCNC: 7.8 G/DL (ref 6–8.4)
SODIUM SERPL-SCNC: 140 MMOL/L (ref 136–145)
TRIGL SERPL-MCNC: 80 MG/DL (ref 30–150)

## 2020-05-01 PROCEDURE — 3079F DIAST BP 80-89 MM HG: CPT | Mod: S$GLB,,, | Performed by: INTERNAL MEDICINE

## 2020-05-01 PROCEDURE — 3077F SYST BP >= 140 MM HG: CPT | Mod: S$GLB,,, | Performed by: INTERNAL MEDICINE

## 2020-05-01 PROCEDURE — 3077F PR MOST RECENT SYSTOLIC BLOOD PRESSURE >= 140 MM HG: ICD-10-PCS | Mod: S$GLB,,, | Performed by: INTERNAL MEDICINE

## 2020-05-01 PROCEDURE — 36415 COLL VENOUS BLD VENIPUNCTURE: CPT

## 2020-05-01 PROCEDURE — 99214 OFFICE O/P EST MOD 30 MIN: CPT | Mod: S$GLB,,, | Performed by: INTERNAL MEDICINE

## 2020-05-01 PROCEDURE — 3008F BODY MASS INDEX DOCD: CPT | Mod: S$GLB,,, | Performed by: INTERNAL MEDICINE

## 2020-05-01 PROCEDURE — 3008F PR BODY MASS INDEX (BMI) DOCUMENTED: ICD-10-PCS | Mod: S$GLB,,, | Performed by: INTERNAL MEDICINE

## 2020-05-01 PROCEDURE — 3079F PR MOST RECENT DIASTOLIC BLOOD PRESSURE 80-89 MM HG: ICD-10-PCS | Mod: S$GLB,,, | Performed by: INTERNAL MEDICINE

## 2020-05-01 PROCEDURE — 99214 PR OFFICE/OUTPT VISIT, EST, LEVL IV, 30-39 MIN: ICD-10-PCS | Mod: S$GLB,,, | Performed by: INTERNAL MEDICINE

## 2020-05-01 PROCEDURE — 80061 LIPID PANEL: CPT

## 2020-05-01 PROCEDURE — 80053 COMPREHEN METABOLIC PANEL: CPT

## 2020-05-01 RX ORDER — LABETALOL 300 MG/1
300 TABLET, FILM COATED ORAL 2 TIMES DAILY
Qty: 180 TABLET | Refills: 1 | Status: SHIPPED | OUTPATIENT
Start: 2020-05-01 | End: 2020-10-02 | Stop reason: SDUPTHER

## 2020-05-01 RX ORDER — AMLODIPINE BESYLATE 10 MG/1
10 TABLET ORAL DAILY
Qty: 90 TABLET | Refills: 1 | Status: SHIPPED | OUTPATIENT
Start: 2020-05-01 | End: 2020-10-02 | Stop reason: SDUPTHER

## 2020-05-01 RX ORDER — ATORVASTATIN CALCIUM 20 MG/1
20 TABLET, FILM COATED ORAL DAILY
Qty: 90 TABLET | Refills: 1 | Status: SHIPPED | OUTPATIENT
Start: 2020-05-01 | End: 2020-10-02 | Stop reason: SDUPTHER

## 2020-05-01 RX ORDER — POTASSIUM CHLORIDE 20 MEQ/1
40 TABLET, EXTENDED RELEASE ORAL 2 TIMES DAILY
Qty: 120 TABLET | Refills: 3 | Status: SHIPPED | OUTPATIENT
Start: 2020-05-01 | End: 2020-10-02 | Stop reason: SDUPTHER

## 2020-05-01 NOTE — PROGRESS NOTES
Subjective:       Patient ID: Vince Kwon is a 54 y.o. male.    Chief Complaint: Hypertension (5 month followup)    Hypertension   This is a chronic problem. The problem is controlled (120s/80s on home monitoring). Pertinent negatives include no chest pain, headaches, neck pain, palpitations, peripheral edema or shortness of breath. Risk factors for coronary artery disease include male gender, obesity and dyslipidemia. Past treatments include beta blockers, calcium channel blockers, diuretics and angiotensin blockers. The current treatment provides significant improvement. There are no compliance problems (has been out of Edarbyclor a few days; Rx refill went to the wrong pharmacy).    Hyperlipidemia   This is a chronic problem. The problem is uncontrolled. Recent lipid tests were reviewed and are high. Exacerbating diseases include obesity. Pertinent negatives include no chest pain, myalgias or shortness of breath. Current antihyperlipidemic treatment includes statins. There are no compliance problems.      Review of Systems   Constitutional: Negative for chills, fatigue, fever and unexpected weight change.   HENT: Negative for congestion, hearing loss, postnasal drip, rhinorrhea, trouble swallowing and voice change.    Eyes: Negative for photophobia and visual disturbance.   Respiratory: Negative for apnea, cough, choking, chest tightness, shortness of breath and wheezing.    Cardiovascular: Negative for chest pain, palpitations and leg swelling.   Gastrointestinal: Negative for abdominal pain, blood in stool, constipation, diarrhea, nausea, rectal pain and vomiting.   Endocrine: Negative for cold intolerance, heat intolerance, polydipsia and polyuria.   Genitourinary: Negative for decreased urine volume, difficulty urinating, discharge, dysuria, flank pain, frequency, genital sores, hematuria, testicular pain and urgency.   Musculoskeletal: Negative for arthralgias, back pain, gait problem, joint  swelling, myalgias and neck pain.   Skin: Negative for color change, rash and wound.   Allergic/Immunologic: Negative for environmental allergies and food allergies.   Neurological: Negative for dizziness, tremors, seizures, syncope, facial asymmetry, speech difficulty, weakness, light-headedness, numbness and headaches.   Hematological: Negative for adenopathy. Does not bruise/bleed easily.   Psychiatric/Behavioral: Negative for confusion, hallucinations, sleep disturbance and suicidal ideas. The patient is not nervous/anxious.        Past Medical History:   Diagnosis Date    Colon tumor     Hyperlipidemia     Hypertension     Hypokalemia 3/1/2018    Multiple pulmonary nodules 8/10/2018      Past Surgical History:   Procedure Laterality Date    COLON SURGERY  03/07/2018    COLONOSCOPY      FINGER SURGERY      RIGHT COLECTOMY Right 03/07/2018           Family History   Problem Relation Age of Onset    Diabetes Mother     Heart disease Mother     Diabetes Sister     Breast cancer Sister     Diabetes Maternal Aunt     Diabetes Maternal Grandmother     Heart disease Maternal Grandmother     Heart disease Maternal Grandfather        Social History     Socioeconomic History    Marital status: Single     Spouse name: Not on file    Number of children: Not on file    Years of education: Not on file    Highest education level: Not on file   Occupational History    Occupation: shipping and recieving   Social Needs    Financial resource strain: Not on file    Food insecurity:     Worry: Not on file     Inability: Not on file    Transportation needs:     Medical: Not on file     Non-medical: Not on file   Tobacco Use    Smoking status: Former Smoker    Smokeless tobacco: Never Used   Substance and Sexual Activity    Alcohol use: Yes     Comment: occ    Drug use: No    Sexual activity: Yes     Partners: Female   Lifestyle    Physical activity:     Days per week: Not on file     Minutes  "per session: Not on file    Stress: Not at all   Relationships    Social connections:     Talks on phone: Not on file     Gets together: Not on file     Attends Islam service: Not on file     Active member of club or organization: Not on file     Attends meetings of clubs or organizations: Not on file     Relationship status: Not on file   Other Topics Concern    Not on file   Social History Narrative    Lives alone       Current Outpatient Medications   Medication Sig Dispense Refill    amLODIPine (NORVASC) 10 MG tablet Take 1 tablet (10 mg total) by mouth once daily. 90 tablet 1    aspirin (ECOTRIN) 81 MG EC tablet Take 81 mg by mouth.      atorvastatin (LIPITOR) 20 MG tablet Take 1 tablet (20 mg total) by mouth once daily. Take one tablet daily. 90 tablet 1    azilsartan med-chlorthalidone (EDARBYCLOR) 40-12.5 mg Tab Take 1 tablet by mouth once daily. 90 tablet 1    labetaloL (NORMODYNE) 300 MG tablet Take 1 tablet (300 mg total) by mouth 2 (two) times daily. 180 tablet 1    potassium chloride SA (K-DUR,KLOR-CON) 20 MEQ tablet Take 2 tablets (40 mEq total) by mouth 2 (two) times daily. 120 tablet 3    omeprazole (PRILOSEC) 20 MG capsule Take 20 mg by mouth once daily.       No current facility-administered medications for this visit.        Review of patient's allergies indicates:  No Known Allergies  Objective:    HPI     Hypertension      Additional comments: 5 month followup          Last edited by Jose R Willis MA on 5/1/2020  9:46 AM. (History)      Blood pressure (!) 140/82, pulse 81, temperature 98.5 °F (36.9 °C), temperature source Oral, height 5' 8" (1.727 m), weight 113.4 kg (250 lb), SpO2 97 %. Body mass index is 38.01 kg/m².   Physical Exam        Assessment:       1. Essential hypertension    2. Pure hypercholesterolemia    3. Hypokalemia        Plan:       Vince was seen today for hypertension.    Diagnoses and all orders for this visit:    Essential hypertension  Comments:  BP fine " considering he has been out of edarbyclor  Orders:  -     amLODIPine (NORVASC) 10 MG tablet; Take 1 tablet (10 mg total) by mouth once daily.  -     azilsartan med-chlorthalidone (EDARBYCLOR) 40-12.5 mg Tab; Take 1 tablet by mouth once daily.  -     labetaloL (NORMODYNE) 300 MG tablet; Take 1 tablet (300 mg total) by mouth 2 (two) times daily.  -     Comprehensive metabolic panel; Future  -     Lipid panel; Future    Pure hypercholesterolemia  -     atorvastatin (LIPITOR) 20 MG tablet; Take 1 tablet (20 mg total) by mouth once daily. Take one tablet daily.  -     Comprehensive metabolic panel; Future  -     Lipid panel; Future    Hypokalemia  Comments:  Has had issues with low potassium despite high doses of supplemental potassium  Orders:  -     potassium chloride SA (K-DUR,KLOR-CON) 20 MEQ tablet; Take 2 tablets (40 mEq total) by mouth 2 (two) times daily.  -     Comprehensive metabolic panel; Future

## 2020-05-04 ENCOUNTER — TELEPHONE (OUTPATIENT)
Dept: FAMILY MEDICINE | Facility: CLINIC | Age: 55
End: 2020-05-04

## 2020-05-04 NOTE — TELEPHONE ENCOUNTER
----- Message from Jose Hoang Jr., MD sent at 5/4/2020  8:20 AM CDT -----  Please call the patient regarding his result.  Potassium looks okay; no changes

## 2020-09-16 DIAGNOSIS — R59.0 LAD (LYMPHADENOPATHY), INTRA-ABDOMINAL: ICD-10-CM

## 2020-09-16 DIAGNOSIS — C18.2 PRIMARY ADENOCARCINOMA OF ASCENDING COLON: Primary | ICD-10-CM

## 2020-09-16 DIAGNOSIS — R91.8 MULTIPLE PULMONARY NODULES: ICD-10-CM

## 2020-10-02 ENCOUNTER — OFFICE VISIT (OUTPATIENT)
Dept: FAMILY MEDICINE | Facility: CLINIC | Age: 55
End: 2020-10-02
Payer: COMMERCIAL

## 2020-10-02 ENCOUNTER — LAB VISIT (OUTPATIENT)
Dept: LAB | Facility: HOSPITAL | Age: 55
End: 2020-10-02
Attending: INTERNAL MEDICINE
Payer: COMMERCIAL

## 2020-10-02 VITALS
TEMPERATURE: 97 F | WEIGHT: 258 LBS | OXYGEN SATURATION: 95 % | BODY MASS INDEX: 39.1 KG/M2 | HEIGHT: 68 IN | HEART RATE: 76 BPM | DIASTOLIC BLOOD PRESSURE: 86 MMHG | SYSTOLIC BLOOD PRESSURE: 152 MMHG

## 2020-10-02 DIAGNOSIS — I10 ESSENTIAL HYPERTENSION: ICD-10-CM

## 2020-10-02 DIAGNOSIS — R91.8 MULTIPLE PULMONARY NODULES: ICD-10-CM

## 2020-10-02 DIAGNOSIS — E87.6 HYPOKALEMIA: ICD-10-CM

## 2020-10-02 DIAGNOSIS — E78.00 PURE HYPERCHOLESTEROLEMIA: ICD-10-CM

## 2020-10-02 DIAGNOSIS — C18.2 PRIMARY ADENOCARCINOMA OF ASCENDING COLON: ICD-10-CM

## 2020-10-02 LAB
ALBUMIN SERPL BCP-MCNC: 4.1 G/DL (ref 3.5–5.2)
ALBUMIN SERPL BCP-MCNC: 4.1 G/DL (ref 3.5–5.2)
ALP SERPL-CCNC: 66 U/L (ref 55–135)
ALP SERPL-CCNC: 66 U/L (ref 55–135)
ALT SERPL W/O P-5'-P-CCNC: 25 U/L (ref 10–44)
ALT SERPL W/O P-5'-P-CCNC: 25 U/L (ref 10–44)
ANION GAP SERPL CALC-SCNC: 11 MMOL/L (ref 8–16)
ANION GAP SERPL CALC-SCNC: 11 MMOL/L (ref 8–16)
AST SERPL-CCNC: 27 U/L (ref 10–40)
AST SERPL-CCNC: 27 U/L (ref 10–40)
BASOPHILS # BLD AUTO: 0.02 K/UL (ref 0–0.2)
BASOPHILS NFR BLD: 0.4 % (ref 0–1.9)
BILIRUB SERPL-MCNC: 1.2 MG/DL (ref 0.1–1)
BILIRUB SERPL-MCNC: 1.2 MG/DL (ref 0.1–1)
BUN SERPL-MCNC: 21 MG/DL (ref 6–20)
BUN SERPL-MCNC: 21 MG/DL (ref 6–20)
CALCIUM SERPL-MCNC: 9.5 MG/DL (ref 8.7–10.5)
CALCIUM SERPL-MCNC: 9.5 MG/DL (ref 8.7–10.5)
CEA SERPL-MCNC: 1.4 NG/ML (ref 0–5)
CHLORIDE SERPL-SCNC: 103 MMOL/L (ref 95–110)
CHLORIDE SERPL-SCNC: 103 MMOL/L (ref 95–110)
CO2 SERPL-SCNC: 28 MMOL/L (ref 23–29)
CO2 SERPL-SCNC: 28 MMOL/L (ref 23–29)
CREAT SERPL-MCNC: 1.4 MG/DL (ref 0.5–1.4)
CREAT SERPL-MCNC: 1.4 MG/DL (ref 0.5–1.4)
DIFFERENTIAL METHOD: ABNORMAL
EOSINOPHIL # BLD AUTO: 0.2 K/UL (ref 0–0.5)
EOSINOPHIL NFR BLD: 3.5 % (ref 0–8)
ERYTHROCYTE [DISTWIDTH] IN BLOOD BY AUTOMATED COUNT: 13.3 % (ref 11.5–14.5)
EST. GFR  (AFRICAN AMERICAN): >60 ML/MIN/1.73 M^2
EST. GFR  (AFRICAN AMERICAN): >60 ML/MIN/1.73 M^2
EST. GFR  (NON AFRICAN AMERICAN): 55.8 ML/MIN/1.73 M^2
EST. GFR  (NON AFRICAN AMERICAN): 55.8 ML/MIN/1.73 M^2
GLUCOSE SERPL-MCNC: 117 MG/DL (ref 70–110)
GLUCOSE SERPL-MCNC: 117 MG/DL (ref 70–110)
HCT VFR BLD AUTO: 45.7 % (ref 40–54)
HGB BLD-MCNC: 14.6 G/DL (ref 14–18)
IMM GRANULOCYTES # BLD AUTO: 0.01 K/UL (ref 0–0.04)
IMM GRANULOCYTES NFR BLD AUTO: 0.2 % (ref 0–0.5)
LYMPHOCYTES # BLD AUTO: 1.9 K/UL (ref 1–4.8)
LYMPHOCYTES NFR BLD: 37.2 % (ref 18–48)
MCH RBC QN AUTO: 27.1 PG (ref 27–31)
MCHC RBC AUTO-ENTMCNC: 31.9 G/DL (ref 32–36)
MCV RBC AUTO: 85 FL (ref 82–98)
MONOCYTES # BLD AUTO: 0.5 K/UL (ref 0.3–1)
MONOCYTES NFR BLD: 9.8 % (ref 4–15)
NEUTROPHILS # BLD AUTO: 2.6 K/UL (ref 1.8–7.7)
NEUTROPHILS NFR BLD: 48.9 % (ref 38–73)
NRBC BLD-RTO: 0 /100 WBC
PLATELET # BLD AUTO: 184 K/UL (ref 150–350)
PMV BLD AUTO: 10 FL (ref 9.2–12.9)
POTASSIUM SERPL-SCNC: 3.5 MMOL/L (ref 3.5–5.1)
POTASSIUM SERPL-SCNC: 3.5 MMOL/L (ref 3.5–5.1)
PROT SERPL-MCNC: 7.3 G/DL (ref 6–8.4)
PROT SERPL-MCNC: 7.3 G/DL (ref 6–8.4)
RBC # BLD AUTO: 5.39 M/UL (ref 4.6–6.2)
SODIUM SERPL-SCNC: 142 MMOL/L (ref 136–145)
SODIUM SERPL-SCNC: 142 MMOL/L (ref 136–145)
WBC # BLD AUTO: 5.21 K/UL (ref 3.9–12.7)

## 2020-10-02 PROCEDURE — 3077F SYST BP >= 140 MM HG: CPT | Mod: S$GLB,,, | Performed by: INTERNAL MEDICINE

## 2020-10-02 PROCEDURE — 36415 COLL VENOUS BLD VENIPUNCTURE: CPT

## 2020-10-02 PROCEDURE — 85025 COMPLETE CBC W/AUTO DIFF WBC: CPT

## 2020-10-02 PROCEDURE — 99214 OFFICE O/P EST MOD 30 MIN: CPT | Mod: S$GLB,,, | Performed by: INTERNAL MEDICINE

## 2020-10-02 PROCEDURE — 80053 COMPREHEN METABOLIC PANEL: CPT

## 2020-10-02 PROCEDURE — 3079F DIAST BP 80-89 MM HG: CPT | Mod: S$GLB,,, | Performed by: INTERNAL MEDICINE

## 2020-10-02 PROCEDURE — 3079F PR MOST RECENT DIASTOLIC BLOOD PRESSURE 80-89 MM HG: ICD-10-PCS | Mod: S$GLB,,, | Performed by: INTERNAL MEDICINE

## 2020-10-02 PROCEDURE — 3077F PR MOST RECENT SYSTOLIC BLOOD PRESSURE >= 140 MM HG: ICD-10-PCS | Mod: S$GLB,,, | Performed by: INTERNAL MEDICINE

## 2020-10-02 PROCEDURE — 3008F PR BODY MASS INDEX (BMI) DOCUMENTED: ICD-10-PCS | Mod: S$GLB,,, | Performed by: INTERNAL MEDICINE

## 2020-10-02 PROCEDURE — 82378 CARCINOEMBRYONIC ANTIGEN: CPT

## 2020-10-02 PROCEDURE — 3008F BODY MASS INDEX DOCD: CPT | Mod: S$GLB,,, | Performed by: INTERNAL MEDICINE

## 2020-10-02 PROCEDURE — 99214 PR OFFICE/OUTPT VISIT, EST, LEVL IV, 30-39 MIN: ICD-10-PCS | Mod: S$GLB,,, | Performed by: INTERNAL MEDICINE

## 2020-10-02 RX ORDER — OMEPRAZOLE 20 MG/1
20 CAPSULE, DELAYED RELEASE ORAL DAILY
Qty: 90 CAPSULE | Refills: 1 | Status: SHIPPED | OUTPATIENT
Start: 2020-10-02 | End: 2021-01-08

## 2020-10-02 RX ORDER — LABETALOL 300 MG/1
300 TABLET, FILM COATED ORAL 2 TIMES DAILY
Qty: 180 TABLET | Refills: 1 | Status: SHIPPED | OUTPATIENT
Start: 2020-10-02 | End: 2021-06-11 | Stop reason: SDUPTHER

## 2020-10-02 RX ORDER — AZILSARTAN KAMEDOXOMIL AND CHLORTHALIDONE 40; 12.5 MG/1; MG/1
1 TABLET ORAL DAILY
Qty: 90 TABLET | Refills: 1 | Status: CANCELLED | OUTPATIENT
Start: 2020-10-02 | End: 2021-10-02

## 2020-10-02 RX ORDER — AZILSARTAN KAMEDOXOMIL AND CHLORTHALIDONE 40; 25 MG/1; MG/1
1 TABLET ORAL DAILY
Qty: 90 TABLET | Refills: 1 | Status: SHIPPED | OUTPATIENT
Start: 2020-10-02 | End: 2021-05-03

## 2020-10-02 RX ORDER — POTASSIUM CHLORIDE 20 MEQ/1
20 TABLET, EXTENDED RELEASE ORAL 3 TIMES DAILY
Qty: 270 TABLET | Refills: 1 | Status: SHIPPED | OUTPATIENT
Start: 2020-10-02 | End: 2021-06-11 | Stop reason: SDUPTHER

## 2020-10-02 RX ORDER — ATORVASTATIN CALCIUM 20 MG/1
20 TABLET, FILM COATED ORAL DAILY
Qty: 90 TABLET | Refills: 1 | Status: SHIPPED | OUTPATIENT
Start: 2020-10-02 | End: 2021-06-11 | Stop reason: SDUPTHER

## 2020-10-02 RX ORDER — AMLODIPINE BESYLATE 10 MG/1
10 TABLET ORAL DAILY
Qty: 90 TABLET | Refills: 1 | Status: SHIPPED | OUTPATIENT
Start: 2020-10-02 | End: 2021-06-11 | Stop reason: SDUPTHER

## 2020-10-02 NOTE — PROGRESS NOTES
Subjective:       Patient ID: Vince Kwon is a 54 y.o. male.    Chief Complaint: Hypertension (5 months followup) and Hyperlipidemia    Hypertension  This is a chronic problem. The problem is uncontrolled (hasn't been monitoring at home; stressed this week). Associated symptoms include anxiety. Pertinent negatives include no chest pain, headaches, malaise/fatigue, neck pain, palpitations, peripheral edema or shortness of breath. (Weight up 8 pounds  ) Risk factors for coronary artery disease include male gender, obesity and dyslipidemia. Past treatments include beta blockers, calcium channel blockers, diuretics and angiotensin blockers. The current treatment provides significant improvement. There are no compliance problems (hasn't been exercising d/t gym being shut down).    Hyperlipidemia  This is a chronic problem. Recent lipid tests were reviewed and are variable. Exacerbating diseases include obesity. Pertinent negatives include no chest pain, myalgias or shortness of breath. Current antihyperlipidemic treatment includes statins. There are no compliance problems.  Risk factors for coronary artery disease include hypertension, male sex and obesity.     Review of Systems   Constitutional: Positive for unexpected weight change. Negative for chills, fatigue, fever and malaise/fatigue.   HENT: Positive for postnasal drip. Negative for congestion, hearing loss, rhinorrhea, trouble swallowing and voice change.    Eyes: Negative for photophobia and visual disturbance.   Respiratory: Negative for apnea, cough, choking, chest tightness, shortness of breath and wheezing.    Cardiovascular: Negative for chest pain, palpitations and leg swelling.   Gastrointestinal: Negative for abdominal pain, blood in stool, constipation, diarrhea, nausea, rectal pain and vomiting.   Endocrine: Negative for cold intolerance, heat intolerance, polydipsia and polyuria.   Genitourinary: Negative for decreased urine volume,  difficulty urinating, discharge, dysuria, flank pain, frequency, genital sores, hematuria, testicular pain and urgency.   Musculoskeletal: Negative for arthralgias, back pain, gait problem, joint swelling, myalgias and neck pain.   Skin: Negative for color change, rash and wound.   Allergic/Immunologic: Negative for environmental allergies and food allergies.   Neurological: Negative for dizziness, tremors, seizures, syncope, facial asymmetry, speech difficulty, weakness, light-headedness, numbness and headaches.   Hematological: Negative for adenopathy. Does not bruise/bleed easily.   Psychiatric/Behavioral: Negative for confusion, hallucinations, sleep disturbance and suicidal ideas. The patient is not nervous/anxious.        Past Medical History:   Diagnosis Date    Colon tumor     Hyperlipidemia     Hypertension     Hypokalemia 3/1/2018    Multiple pulmonary nodules 8/10/2018      Past Surgical History:   Procedure Laterality Date    COLON SURGERY  03/07/2018    COLONOSCOPY      FINGER SURGERY      RIGHT COLECTOMY Right 03/07/2018           Family History   Problem Relation Age of Onset    Diabetes Mother     Heart disease Mother     Diabetes Sister     Breast cancer Sister     Diabetes Maternal Aunt     Diabetes Maternal Grandmother     Heart disease Maternal Grandmother     Heart disease Maternal Grandfather        Social History     Socioeconomic History    Marital status: Single     Spouse name: Not on file    Number of children: Not on file    Years of education: Not on file    Highest education level: Not on file   Occupational History    Occupation: shipping and recieving   Social Needs    Financial resource strain: Not on file    Food insecurity     Worry: Not on file     Inability: Not on file    Transportation needs     Medical: Not on file     Non-medical: Not on file   Tobacco Use    Smoking status: Former Smoker    Smokeless tobacco: Never Used   Substance and  "Sexual Activity    Alcohol use: Yes     Comment: occ    Drug use: No    Sexual activity: Yes     Partners: Female   Lifestyle    Physical activity     Days per week: Not on file     Minutes per session: Not on file    Stress: Not at all   Relationships    Social connections     Talks on phone: Not on file     Gets together: Not on file     Attends Shinto service: Not on file     Active member of club or organization: Not on file     Attends meetings of clubs or organizations: Not on file     Relationship status: Not on file   Other Topics Concern    Not on file   Social History Narrative    Lives alone       Current Outpatient Medications   Medication Sig Dispense Refill    amLODIPine (NORVASC) 10 MG tablet Take 1 tablet (10 mg total) by mouth once daily. 90 tablet 1    aspirin (ECOTRIN) 81 MG EC tablet Take 81 mg by mouth.      atorvastatin (LIPITOR) 20 MG tablet Take 1 tablet (20 mg total) by mouth once daily. Take one tablet daily. 90 tablet 1    labetaloL (NORMODYNE) 300 MG tablet Take 1 tablet (300 mg total) by mouth 2 (two) times daily. 180 tablet 1    omeprazole (PRILOSEC) 20 MG capsule Take 1 capsule (20 mg total) by mouth once daily. 90 capsule 1    potassium chloride SA (K-DUR,KLOR-CON) 20 MEQ tablet Take 1 tablet (20 mEq total) by mouth 3 (three) times daily. 270 tablet 1    azilsartan med-chlorthalidone (EDARBYCLOR) 40-25 mg Tab Take 1 tablet by mouth once daily. 90 tablet 1     No current facility-administered medications for this visit.        Review of patient's allergies indicates:  No Known Allergies  Objective:    HPI     Hypertension      Additional comments: 5 months followup          Last edited by Jose R Willis MA on 10/2/2020  8:58 AM. (History)      Blood pressure (!) 152/86, pulse 76, temperature 97.3 °F (36.3 °C), temperature source Temporal, height 5' 8" (1.727 m), weight 117 kg (258 lb), SpO2 95 %. Body mass index is 39.23 kg/m².   Physical Exam  Vitals signs and " nursing note reviewed.   Constitutional:       General: He is not in acute distress.     Appearance: He is well-developed. He is obese. He is not ill-appearing, toxic-appearing or diaphoretic.   HENT:      Head: Normocephalic and atraumatic.   Eyes:      General: No scleral icterus.        Right eye: No discharge.         Left eye: No discharge.      Conjunctiva/sclera: Conjunctivae normal.   Neck:      Vascular: No carotid bruit.   Cardiovascular:      Rate and Rhythm: Normal rate and regular rhythm.      Heart sounds: Normal heart sounds. No murmur.   Pulmonary:      Effort: Pulmonary effort is normal. No respiratory distress.      Breath sounds: Normal breath sounds. No decreased breath sounds, wheezing, rhonchi or rales.   Abdominal:      General: There is no distension.      Palpations: Abdomen is soft.      Tenderness: There is no abdominal tenderness. There is no guarding or rebound.   Musculoskeletal:      Right lower leg: No edema.      Left lower leg: No edema.   Skin:     General: Skin is warm and dry.   Neurological:      Mental Status: He is alert.      Motor: No tremor.   Psychiatric:         Mood and Affect: Mood normal.         Speech: Speech normal.         Behavior: Behavior normal.             Assessment:       1. Essential hypertension    2. Pure hypercholesterolemia    3. Hypokalemia        Plan:       Vince was seen today for hypertension and hyperlipidemia.    Diagnoses and all orders for this visit:    Essential hypertension  Comments:  Increase edarbyclor to 40/25  Orders:  -     amLODIPine (NORVASC) 10 MG tablet; Take 1 tablet (10 mg total) by mouth once daily.  -     labetaloL (NORMODYNE) 300 MG tablet; Take 1 tablet (300 mg total) by mouth 2 (two) times daily.  -     Comprehensive metabolic panel; Future    Pure hypercholesterolemia  -     atorvastatin (LIPITOR) 20 MG tablet; Take 1 tablet (20 mg total) by mouth once daily. Take one tablet daily.    Hypokalemia  Comments:  Has had issues  with low potassium despite high doses of supplemental potassium.  Will need to monitor given increase in HCTZ  Orders:  -     potassium chloride SA (K-DUR,KLOR-CON) 20 MEQ tablet; Take 1 tablet (20 mEq total) by mouth 3 (three) times daily.  -     Comprehensive metabolic panel; Future    Other orders  -     Cancel: azilsartan med-chlorthalidone (EDARBYCLOR) 40-12.5 mg Tab; Take 1 tablet by mouth once daily.  -     omeprazole (PRILOSEC) 20 MG capsule; Take 1 capsule (20 mg total) by mouth once daily.  -     azilsartan med-chlorthalidone (EDARBYCLOR) 40-25 mg Tab; Take 1 tablet by mouth once daily.

## 2020-10-09 ENCOUNTER — HOSPITAL ENCOUNTER (OUTPATIENT)
Dept: RADIOLOGY | Facility: HOSPITAL | Age: 55
Discharge: HOME | End: 2020-10-09
Attending: NURSE PRACTITIONER
Payer: COMMERCIAL

## 2020-10-09 DIAGNOSIS — C18.2 PRIMARY ADENOCARCINOMA OF ASCENDING COLON: ICD-10-CM

## 2020-10-09 DIAGNOSIS — R91.8 MULTIPLE PULMONARY NODULES: ICD-10-CM

## 2020-10-09 DIAGNOSIS — R59.0 LAD (LYMPHADENOPATHY), INTRA-ABDOMINAL: ICD-10-CM

## 2020-10-09 PROCEDURE — 25500020 PHARM REV CODE 255: Mod: PO | Performed by: NURSE PRACTITIONER

## 2020-10-09 PROCEDURE — 74178 CT ABD&PLV WO CNTR FLWD CNTR: CPT | Mod: TC,PO

## 2020-10-09 PROCEDURE — 82565 ASSAY OF CREATININE: CPT | Mod: PO

## 2020-10-09 RX ADMIN — IOHEXOL 100 ML: 350 INJECTION, SOLUTION INTRAVENOUS at 09:10

## 2020-10-12 ENCOUNTER — OFFICE VISIT (OUTPATIENT)
Dept: HEMATOLOGY/ONCOLOGY | Facility: CLINIC | Age: 55
End: 2020-10-12
Payer: COMMERCIAL

## 2020-10-12 VITALS
DIASTOLIC BLOOD PRESSURE: 87 MMHG | BODY MASS INDEX: 39.38 KG/M2 | SYSTOLIC BLOOD PRESSURE: 140 MMHG | TEMPERATURE: 97 F | HEART RATE: 89 BPM | RESPIRATION RATE: 17 BRPM | WEIGHT: 259 LBS

## 2020-10-12 DIAGNOSIS — R91.8 MULTIPLE PULMONARY NODULES: ICD-10-CM

## 2020-10-12 DIAGNOSIS — C18.2 PRIMARY ADENOCARCINOMA OF ASCENDING COLON: Primary | ICD-10-CM

## 2020-10-12 DIAGNOSIS — C18.9 MALIGNANT NEOPLASM OF COLON, UNSPECIFIED PART OF COLON: ICD-10-CM

## 2020-10-12 PROCEDURE — 3008F PR BODY MASS INDEX (BMI) DOCUMENTED: ICD-10-PCS | Mod: S$GLB,,, | Performed by: INTERNAL MEDICINE

## 2020-10-12 PROCEDURE — 3079F PR MOST RECENT DIASTOLIC BLOOD PRESSURE 80-89 MM HG: ICD-10-PCS | Mod: S$GLB,,, | Performed by: INTERNAL MEDICINE

## 2020-10-12 PROCEDURE — 3008F BODY MASS INDEX DOCD: CPT | Mod: S$GLB,,, | Performed by: INTERNAL MEDICINE

## 2020-10-12 PROCEDURE — 3079F DIAST BP 80-89 MM HG: CPT | Mod: S$GLB,,, | Performed by: INTERNAL MEDICINE

## 2020-10-12 PROCEDURE — 3077F PR MOST RECENT SYSTOLIC BLOOD PRESSURE >= 140 MM HG: ICD-10-PCS | Mod: S$GLB,,, | Performed by: INTERNAL MEDICINE

## 2020-10-12 PROCEDURE — 99214 OFFICE O/P EST MOD 30 MIN: CPT | Mod: S$GLB,,, | Performed by: INTERNAL MEDICINE

## 2020-10-12 PROCEDURE — 99214 PR OFFICE/OUTPT VISIT, EST, LEVL IV, 30-39 MIN: ICD-10-PCS | Mod: S$GLB,,, | Performed by: INTERNAL MEDICINE

## 2020-10-12 PROCEDURE — 3077F SYST BP >= 140 MM HG: CPT | Mod: S$GLB,,, | Performed by: INTERNAL MEDICINE

## 2021-01-08 ENCOUNTER — OFFICE VISIT (OUTPATIENT)
Dept: FAMILY MEDICINE | Facility: CLINIC | Age: 56
End: 2021-01-08
Payer: COMMERCIAL

## 2021-01-08 VITALS
HEIGHT: 68 IN | WEIGHT: 258 LBS | HEART RATE: 52 BPM | BODY MASS INDEX: 39.1 KG/M2 | DIASTOLIC BLOOD PRESSURE: 70 MMHG | OXYGEN SATURATION: 97 % | SYSTOLIC BLOOD PRESSURE: 120 MMHG | TEMPERATURE: 98 F

## 2021-01-08 DIAGNOSIS — I10 ESSENTIAL HYPERTENSION: Primary | ICD-10-CM

## 2021-01-08 DIAGNOSIS — R73.01 IMPAIRED FASTING GLUCOSE: ICD-10-CM

## 2021-01-08 PROCEDURE — 99213 OFFICE O/P EST LOW 20 MIN: CPT | Mod: S$GLB,,, | Performed by: INTERNAL MEDICINE

## 2021-01-08 PROCEDURE — 3074F PR MOST RECENT SYSTOLIC BLOOD PRESSURE < 130 MM HG: ICD-10-PCS | Mod: S$GLB,,, | Performed by: INTERNAL MEDICINE

## 2021-01-08 PROCEDURE — 3008F PR BODY MASS INDEX (BMI) DOCUMENTED: ICD-10-PCS | Mod: S$GLB,,, | Performed by: INTERNAL MEDICINE

## 2021-01-08 PROCEDURE — 1126F PR PAIN SEVERITY QUANTIFIED, NO PAIN PRESENT: ICD-10-PCS | Mod: S$GLB,,, | Performed by: INTERNAL MEDICINE

## 2021-01-08 PROCEDURE — 3078F PR MOST RECENT DIASTOLIC BLOOD PRESSURE < 80 MM HG: ICD-10-PCS | Mod: S$GLB,,, | Performed by: INTERNAL MEDICINE

## 2021-01-08 PROCEDURE — 99213 PR OFFICE/OUTPT VISIT, EST, LEVL III, 20-29 MIN: ICD-10-PCS | Mod: S$GLB,,, | Performed by: INTERNAL MEDICINE

## 2021-01-08 PROCEDURE — 3078F DIAST BP <80 MM HG: CPT | Mod: S$GLB,,, | Performed by: INTERNAL MEDICINE

## 2021-01-08 PROCEDURE — 3008F BODY MASS INDEX DOCD: CPT | Mod: S$GLB,,, | Performed by: INTERNAL MEDICINE

## 2021-01-08 PROCEDURE — 1126F AMNT PAIN NOTED NONE PRSNT: CPT | Mod: S$GLB,,, | Performed by: INTERNAL MEDICINE

## 2021-01-08 PROCEDURE — 3074F SYST BP LT 130 MM HG: CPT | Mod: S$GLB,,, | Performed by: INTERNAL MEDICINE

## 2021-03-07 ENCOUNTER — HOSPITAL ENCOUNTER (EMERGENCY)
Facility: HOSPITAL | Age: 56
Discharge: HOME | End: 2021-03-07
Attending: EMERGENCY MEDICINE
Payer: COMMERCIAL

## 2021-03-07 VITALS
RESPIRATION RATE: 27 BRPM | WEIGHT: 250 LBS | HEART RATE: 88 BPM | DIASTOLIC BLOOD PRESSURE: 80 MMHG | HEIGHT: 68 IN | BODY MASS INDEX: 37.89 KG/M2 | TEMPERATURE: 99 F | SYSTOLIC BLOOD PRESSURE: 157 MMHG | OXYGEN SATURATION: 96 %

## 2021-03-07 DIAGNOSIS — R42 DIZZINESS: ICD-10-CM

## 2021-03-07 DIAGNOSIS — E87.6 HYPOKALEMIA: Primary | ICD-10-CM

## 2021-03-07 DIAGNOSIS — R11.2 NAUSEA AND VOMITING, INTRACTABILITY OF VOMITING NOT SPECIFIED, UNSPECIFIED VOMITING TYPE: ICD-10-CM

## 2021-03-07 LAB
ALBUMIN SERPL BCP-MCNC: 4.1 G/DL (ref 3.5–5.2)
ALP SERPL-CCNC: 60 U/L (ref 55–135)
ALT SERPL W/O P-5'-P-CCNC: 27 U/L (ref 10–44)
ANION GAP SERPL CALC-SCNC: 11 MMOL/L (ref 8–16)
AST SERPL-CCNC: 25 U/L (ref 10–40)
BASOPHILS # BLD AUTO: 0.02 K/UL (ref 0–0.2)
BASOPHILS NFR BLD: 0.2 % (ref 0–1.9)
BILIRUB SERPL-MCNC: 0.9 MG/DL (ref 0.1–1)
BUN SERPL-MCNC: 21 MG/DL (ref 6–20)
CALCIUM SERPL-MCNC: 9.9 MG/DL (ref 8.7–10.5)
CHLORIDE SERPL-SCNC: 99 MMOL/L (ref 95–110)
CO2 SERPL-SCNC: 31 MMOL/L (ref 23–29)
CREAT SERPL-MCNC: 1.3 MG/DL (ref 0.5–1.4)
DIFFERENTIAL METHOD: ABNORMAL
EOSINOPHIL # BLD AUTO: 0 K/UL (ref 0–0.5)
EOSINOPHIL NFR BLD: 0.4 % (ref 0–8)
ERYTHROCYTE [DISTWIDTH] IN BLOOD BY AUTOMATED COUNT: 13.4 % (ref 11.5–14.5)
EST. GFR  (AFRICAN AMERICAN): >60 ML/MIN/1.73 M^2
EST. GFR  (NON AFRICAN AMERICAN): >60 ML/MIN/1.73 M^2
GLUCOSE SERPL-MCNC: 141 MG/DL (ref 70–110)
HCT VFR BLD AUTO: 48.6 % (ref 40–54)
HGB BLD-MCNC: 15.8 G/DL (ref 14–18)
IMM GRANULOCYTES # BLD AUTO: 0.04 K/UL (ref 0–0.04)
IMM GRANULOCYTES NFR BLD AUTO: 0.4 % (ref 0–0.5)
LYMPHOCYTES # BLD AUTO: 1.4 K/UL (ref 1–4.8)
LYMPHOCYTES NFR BLD: 14.1 % (ref 18–48)
MCH RBC QN AUTO: 27.3 PG (ref 27–31)
MCHC RBC AUTO-ENTMCNC: 32.5 G/DL (ref 32–36)
MCV RBC AUTO: 84 FL (ref 82–98)
MONOCYTES # BLD AUTO: 0.7 K/UL (ref 0.3–1)
MONOCYTES NFR BLD: 7.1 % (ref 4–15)
NEUTROPHILS # BLD AUTO: 7.6 K/UL (ref 1.8–7.7)
NEUTROPHILS NFR BLD: 77.8 % (ref 38–73)
NRBC BLD-RTO: 0 /100 WBC
PLATELET # BLD AUTO: 236 K/UL (ref 150–350)
PMV BLD AUTO: 9.4 FL (ref 9.2–12.9)
POTASSIUM SERPL-SCNC: 3.3 MMOL/L (ref 3.5–5.1)
PROT SERPL-MCNC: 7.8 G/DL (ref 6–8.4)
RBC # BLD AUTO: 5.79 M/UL (ref 4.6–6.2)
SODIUM SERPL-SCNC: 141 MMOL/L (ref 136–145)
TROPONIN I SERPL DL<=0.01 NG/ML-MCNC: <0.03 NG/ML
WBC # BLD AUTO: 9.71 K/UL (ref 3.9–12.7)

## 2021-03-07 PROCEDURE — 93010 ELECTROCARDIOGRAM REPORT: CPT | Mod: ,,, | Performed by: INTERNAL MEDICINE

## 2021-03-07 PROCEDURE — 36415 COLL VENOUS BLD VENIPUNCTURE: CPT | Performed by: NURSE PRACTITIONER

## 2021-03-07 PROCEDURE — 63600175 PHARM REV CODE 636 W HCPCS: Performed by: NURSE PRACTITIONER

## 2021-03-07 PROCEDURE — 84484 ASSAY OF TROPONIN QUANT: CPT | Performed by: NURSE PRACTITIONER

## 2021-03-07 PROCEDURE — 25000003 PHARM REV CODE 250: Performed by: NURSE PRACTITIONER

## 2021-03-07 PROCEDURE — 99284 EMERGENCY DEPT VISIT MOD MDM: CPT | Mod: 25

## 2021-03-07 PROCEDURE — 85025 COMPLETE CBC W/AUTO DIFF WBC: CPT | Performed by: NURSE PRACTITIONER

## 2021-03-07 PROCEDURE — 80053 COMPREHEN METABOLIC PANEL: CPT | Performed by: NURSE PRACTITIONER

## 2021-03-07 PROCEDURE — 93010 EKG 12-LEAD: ICD-10-PCS | Mod: ,,, | Performed by: INTERNAL MEDICINE

## 2021-03-07 PROCEDURE — 96374 THER/PROPH/DIAG INJ IV PUSH: CPT

## 2021-03-07 PROCEDURE — 93005 ELECTROCARDIOGRAM TRACING: CPT | Performed by: INTERNAL MEDICINE

## 2021-03-07 RX ORDER — ONDANSETRON 2 MG/ML
4 INJECTION INTRAMUSCULAR; INTRAVENOUS
Status: COMPLETED | OUTPATIENT
Start: 2021-03-07 | End: 2021-03-07

## 2021-03-07 RX ORDER — ONDANSETRON 4 MG/1
4 TABLET, ORALLY DISINTEGRATING ORAL EVERY 8 HOURS PRN
Qty: 12 TABLET | Refills: 0 | Status: SHIPPED | OUTPATIENT
Start: 2021-03-07 | End: 2021-06-11

## 2021-03-07 RX ORDER — POTASSIUM CHLORIDE 20 MEQ/1
40 TABLET, EXTENDED RELEASE ORAL
Status: COMPLETED | OUTPATIENT
Start: 2021-03-07 | End: 2021-03-07

## 2021-03-07 RX ADMIN — SODIUM CHLORIDE 1000 ML: 0.9 INJECTION, SOLUTION INTRAVENOUS at 03:03

## 2021-03-07 RX ADMIN — POTASSIUM CHLORIDE 40 MEQ: 1500 TABLET, EXTENDED RELEASE ORAL at 03:03

## 2021-03-07 RX ADMIN — ONDANSETRON 4 MG: 2 INJECTION INTRAMUSCULAR; INTRAVENOUS at 03:03

## 2021-03-09 ENCOUNTER — OFFICE VISIT (OUTPATIENT)
Dept: FAMILY MEDICINE | Facility: CLINIC | Age: 56
End: 2021-03-09
Payer: COMMERCIAL

## 2021-03-09 VITALS
HEART RATE: 86 BPM | WEIGHT: 248 LBS | BODY MASS INDEX: 37.59 KG/M2 | HEIGHT: 68 IN | DIASTOLIC BLOOD PRESSURE: 80 MMHG | SYSTOLIC BLOOD PRESSURE: 130 MMHG | OXYGEN SATURATION: 95 % | TEMPERATURE: 98 F

## 2021-03-09 DIAGNOSIS — E87.6 HYPOKALEMIA: ICD-10-CM

## 2021-03-09 DIAGNOSIS — R42 VERTIGO: Primary | ICD-10-CM

## 2021-03-09 PROCEDURE — 3075F SYST BP GE 130 - 139MM HG: CPT | Mod: S$GLB,,, | Performed by: INTERNAL MEDICINE

## 2021-03-09 PROCEDURE — 3075F PR MOST RECENT SYSTOLIC BLOOD PRESS GE 130-139MM HG: ICD-10-PCS | Mod: S$GLB,,, | Performed by: INTERNAL MEDICINE

## 2021-03-09 PROCEDURE — 1126F PR PAIN SEVERITY QUANTIFIED, NO PAIN PRESENT: ICD-10-PCS | Mod: S$GLB,,, | Performed by: INTERNAL MEDICINE

## 2021-03-09 PROCEDURE — 3008F PR BODY MASS INDEX (BMI) DOCUMENTED: ICD-10-PCS | Mod: S$GLB,,, | Performed by: INTERNAL MEDICINE

## 2021-03-09 PROCEDURE — 3079F DIAST BP 80-89 MM HG: CPT | Mod: S$GLB,,, | Performed by: INTERNAL MEDICINE

## 2021-03-09 PROCEDURE — 99213 PR OFFICE/OUTPT VISIT, EST, LEVL III, 20-29 MIN: ICD-10-PCS | Mod: S$GLB,,, | Performed by: INTERNAL MEDICINE

## 2021-03-09 PROCEDURE — 3079F PR MOST RECENT DIASTOLIC BLOOD PRESSURE 80-89 MM HG: ICD-10-PCS | Mod: S$GLB,,, | Performed by: INTERNAL MEDICINE

## 2021-03-09 PROCEDURE — 99213 OFFICE O/P EST LOW 20 MIN: CPT | Mod: S$GLB,,, | Performed by: INTERNAL MEDICINE

## 2021-03-09 PROCEDURE — 3008F BODY MASS INDEX DOCD: CPT | Mod: S$GLB,,, | Performed by: INTERNAL MEDICINE

## 2021-03-09 PROCEDURE — 1126F AMNT PAIN NOTED NONE PRSNT: CPT | Mod: S$GLB,,, | Performed by: INTERNAL MEDICINE

## 2021-03-09 RX ORDER — MECLIZINE HYDROCHLORIDE 25 MG/1
25 TABLET ORAL 3 TIMES DAILY PRN
Qty: 30 TABLET | Refills: 1 | Status: SHIPPED | OUTPATIENT
Start: 2021-03-09 | End: 2021-04-12

## 2021-03-19 ENCOUNTER — OFFICE VISIT (OUTPATIENT)
Dept: URGENT CARE | Facility: CLINIC | Age: 56
End: 2021-03-19
Payer: COMMERCIAL

## 2021-03-19 VITALS
HEIGHT: 68 IN | DIASTOLIC BLOOD PRESSURE: 85 MMHG | HEART RATE: 79 BPM | WEIGHT: 252 LBS | TEMPERATURE: 98 F | RESPIRATION RATE: 18 BRPM | BODY MASS INDEX: 38.19 KG/M2 | SYSTOLIC BLOOD PRESSURE: 129 MMHG

## 2021-03-19 DIAGNOSIS — H92.01 OTALGIA, RIGHT: ICD-10-CM

## 2021-03-19 DIAGNOSIS — H66.91 RIGHT OTITIS MEDIA, UNSPECIFIED OTITIS MEDIA TYPE: Primary | ICD-10-CM

## 2021-03-19 PROCEDURE — 99204 OFFICE O/P NEW MOD 45 MIN: CPT | Mod: S$GLB,,, | Performed by: EMERGENCY MEDICINE

## 2021-03-19 PROCEDURE — 99204 PR OFFICE/OUTPT VISIT, NEW, LEVL IV, 45-59 MIN: ICD-10-PCS | Mod: S$GLB,,, | Performed by: EMERGENCY MEDICINE

## 2021-03-19 RX ORDER — AMOXICILLIN AND CLAVULANATE POTASSIUM 875; 125 MG/1; MG/1
1 TABLET, FILM COATED ORAL EVERY 12 HOURS
Qty: 20 TABLET | Refills: 0 | Status: SHIPPED | OUTPATIENT
Start: 2021-03-19 | End: 2021-03-29

## 2021-04-29 ENCOUNTER — OFFICE VISIT (OUTPATIENT)
Dept: HEMATOLOGY/ONCOLOGY | Facility: CLINIC | Age: 56
End: 2021-04-29
Payer: COMMERCIAL

## 2021-04-29 ENCOUNTER — TELEPHONE (OUTPATIENT)
Dept: HEMATOLOGY/ONCOLOGY | Facility: CLINIC | Age: 56
End: 2021-04-29

## 2021-04-29 VITALS
RESPIRATION RATE: 18 BRPM | HEART RATE: 102 BPM | DIASTOLIC BLOOD PRESSURE: 94 MMHG | SYSTOLIC BLOOD PRESSURE: 165 MMHG | WEIGHT: 249 LBS | HEIGHT: 68 IN | BODY MASS INDEX: 37.74 KG/M2

## 2021-04-29 DIAGNOSIS — C18.2 PRIMARY ADENOCARCINOMA OF ASCENDING COLON: Primary | ICD-10-CM

## 2021-04-29 PROCEDURE — 3008F BODY MASS INDEX DOCD: CPT | Mod: S$GLB,,, | Performed by: INTERNAL MEDICINE

## 2021-04-29 PROCEDURE — 99214 PR OFFICE/OUTPT VISIT, EST, LEVL IV, 30-39 MIN: ICD-10-PCS | Mod: S$GLB,,, | Performed by: INTERNAL MEDICINE

## 2021-04-29 PROCEDURE — 1126F AMNT PAIN NOTED NONE PRSNT: CPT | Mod: S$GLB,,, | Performed by: INTERNAL MEDICINE

## 2021-04-29 PROCEDURE — 1126F PR PAIN SEVERITY QUANTIFIED, NO PAIN PRESENT: ICD-10-PCS | Mod: S$GLB,,, | Performed by: INTERNAL MEDICINE

## 2021-04-29 PROCEDURE — 99214 OFFICE O/P EST MOD 30 MIN: CPT | Mod: S$GLB,,, | Performed by: INTERNAL MEDICINE

## 2021-04-29 PROCEDURE — 3008F PR BODY MASS INDEX (BMI) DOCUMENTED: ICD-10-PCS | Mod: S$GLB,,, | Performed by: INTERNAL MEDICINE

## 2021-05-07 ENCOUNTER — TELEPHONE (OUTPATIENT)
Dept: HEMATOLOGY/ONCOLOGY | Facility: CLINIC | Age: 56
End: 2021-05-07

## 2021-05-07 DIAGNOSIS — C18.9 MALIGNANT NEOPLASM OF COLON, UNSPECIFIED PART OF COLON: ICD-10-CM

## 2021-05-07 DIAGNOSIS — C18.2 PRIMARY ADENOCARCINOMA OF ASCENDING COLON: Primary | ICD-10-CM

## 2021-06-11 ENCOUNTER — OFFICE VISIT (OUTPATIENT)
Dept: FAMILY MEDICINE | Facility: CLINIC | Age: 56
End: 2021-06-11
Payer: COMMERCIAL

## 2021-06-11 VITALS
BODY MASS INDEX: 38.25 KG/M2 | HEART RATE: 93 BPM | WEIGHT: 252.38 LBS | SYSTOLIC BLOOD PRESSURE: 130 MMHG | OXYGEN SATURATION: 98 % | TEMPERATURE: 99 F | HEIGHT: 68 IN | DIASTOLIC BLOOD PRESSURE: 72 MMHG

## 2021-06-11 DIAGNOSIS — E87.6 HYPOKALEMIA: ICD-10-CM

## 2021-06-11 DIAGNOSIS — I10 ESSENTIAL HYPERTENSION: ICD-10-CM

## 2021-06-11 DIAGNOSIS — E78.00 PURE HYPERCHOLESTEROLEMIA: ICD-10-CM

## 2021-06-11 PROCEDURE — 3008F BODY MASS INDEX DOCD: CPT | Mod: S$GLB,,, | Performed by: INTERNAL MEDICINE

## 2021-06-11 PROCEDURE — 3075F PR MOST RECENT SYSTOLIC BLOOD PRESS GE 130-139MM HG: ICD-10-PCS | Mod: S$GLB,,, | Performed by: INTERNAL MEDICINE

## 2021-06-11 PROCEDURE — 3075F SYST BP GE 130 - 139MM HG: CPT | Mod: S$GLB,,, | Performed by: INTERNAL MEDICINE

## 2021-06-11 PROCEDURE — 3078F PR MOST RECENT DIASTOLIC BLOOD PRESSURE < 80 MM HG: ICD-10-PCS | Mod: S$GLB,,, | Performed by: INTERNAL MEDICINE

## 2021-06-11 PROCEDURE — 99213 PR OFFICE/OUTPT VISIT, EST, LEVL III, 20-29 MIN: ICD-10-PCS | Mod: S$GLB,,, | Performed by: INTERNAL MEDICINE

## 2021-06-11 PROCEDURE — 1126F PR PAIN SEVERITY QUANTIFIED, NO PAIN PRESENT: ICD-10-PCS | Mod: S$GLB,,, | Performed by: INTERNAL MEDICINE

## 2021-06-11 PROCEDURE — 3078F DIAST BP <80 MM HG: CPT | Mod: S$GLB,,, | Performed by: INTERNAL MEDICINE

## 2021-06-11 PROCEDURE — 1126F AMNT PAIN NOTED NONE PRSNT: CPT | Mod: S$GLB,,, | Performed by: INTERNAL MEDICINE

## 2021-06-11 PROCEDURE — 3008F PR BODY MASS INDEX (BMI) DOCUMENTED: ICD-10-PCS | Mod: S$GLB,,, | Performed by: INTERNAL MEDICINE

## 2021-06-11 PROCEDURE — 99213 OFFICE O/P EST LOW 20 MIN: CPT | Mod: S$GLB,,, | Performed by: INTERNAL MEDICINE

## 2021-06-11 RX ORDER — ATORVASTATIN CALCIUM 20 MG/1
20 TABLET, FILM COATED ORAL DAILY
Qty: 90 TABLET | Refills: 1 | Status: SHIPPED | OUTPATIENT
Start: 2021-06-11 | End: 2021-10-15 | Stop reason: SDUPTHER

## 2021-06-11 RX ORDER — AZILSARTAN KAMEDOXOMIL AND CHLORTHALIDONE 40; 25 MG/1; MG/1
1 TABLET ORAL DAILY
Qty: 90 TABLET | Refills: 1 | Status: SHIPPED | OUTPATIENT
Start: 2021-06-11 | End: 2021-10-15 | Stop reason: SDUPTHER

## 2021-06-11 RX ORDER — AMLODIPINE BESYLATE 10 MG/1
10 TABLET ORAL DAILY
Qty: 90 TABLET | Refills: 1 | Status: SHIPPED | OUTPATIENT
Start: 2021-06-11 | End: 2021-10-15 | Stop reason: SDUPTHER

## 2021-06-11 RX ORDER — POTASSIUM CHLORIDE 20 MEQ/1
20 TABLET, EXTENDED RELEASE ORAL 3 TIMES DAILY
Qty: 270 TABLET | Refills: 1 | Status: SHIPPED | OUTPATIENT
Start: 2021-06-11 | End: 2021-10-15 | Stop reason: SDUPTHER

## 2021-06-11 RX ORDER — LABETALOL 300 MG/1
300 TABLET, FILM COATED ORAL 2 TIMES DAILY
Qty: 180 TABLET | Refills: 1 | Status: SHIPPED | OUTPATIENT
Start: 2021-06-11 | End: 2021-10-15 | Stop reason: SDUPTHER

## 2021-10-15 ENCOUNTER — OFFICE VISIT (OUTPATIENT)
Dept: FAMILY MEDICINE | Facility: CLINIC | Age: 56
End: 2021-10-15
Payer: COMMERCIAL

## 2021-10-15 ENCOUNTER — LAB VISIT (OUTPATIENT)
Dept: LAB | Facility: HOSPITAL | Age: 56
End: 2021-10-15
Attending: INTERNAL MEDICINE
Payer: COMMERCIAL

## 2021-10-15 VITALS
HEART RATE: 79 BPM | WEIGHT: 248 LBS | OXYGEN SATURATION: 96 % | DIASTOLIC BLOOD PRESSURE: 80 MMHG | HEIGHT: 68 IN | BODY MASS INDEX: 37.59 KG/M2 | TEMPERATURE: 97 F | SYSTOLIC BLOOD PRESSURE: 120 MMHG

## 2021-10-15 DIAGNOSIS — E78.00 PURE HYPERCHOLESTEROLEMIA: ICD-10-CM

## 2021-10-15 DIAGNOSIS — I10 ESSENTIAL HYPERTENSION: ICD-10-CM

## 2021-10-15 DIAGNOSIS — Z00.01 ENCOUNTER FOR PREVENTATIVE ADULT HEALTH CARE EXAM WITH ABNORMAL FINDINGS: Primary | ICD-10-CM

## 2021-10-15 DIAGNOSIS — Z12.5 PROSTATE CANCER SCREENING: ICD-10-CM

## 2021-10-15 DIAGNOSIS — R73.01 IMPAIRED FASTING GLUCOSE: ICD-10-CM

## 2021-10-15 DIAGNOSIS — Z00.01 ENCOUNTER FOR PREVENTATIVE ADULT HEALTH CARE EXAM WITH ABNORMAL FINDINGS: ICD-10-CM

## 2021-10-15 DIAGNOSIS — C18.2 PRIMARY ADENOCARCINOMA OF ASCENDING COLON: ICD-10-CM

## 2021-10-15 DIAGNOSIS — E87.6 HYPOKALEMIA: ICD-10-CM

## 2021-10-15 LAB
ALBUMIN SERPL BCP-MCNC: 4 G/DL (ref 3.5–5.2)
ALP SERPL-CCNC: 59 U/L (ref 55–135)
ALT SERPL W/O P-5'-P-CCNC: 30 U/L (ref 10–44)
ANION GAP SERPL CALC-SCNC: 9 MMOL/L (ref 8–16)
AST SERPL-CCNC: 33 U/L (ref 10–40)
BASOPHILS # BLD AUTO: 0.03 K/UL (ref 0–0.2)
BASOPHILS NFR BLD: 0.6 % (ref 0–1.9)
BILIRUB SERPL-MCNC: 1.1 MG/DL (ref 0.1–1)
BUN SERPL-MCNC: 17 MG/DL (ref 6–20)
CALCIUM SERPL-MCNC: 9.3 MG/DL (ref 8.7–10.5)
CEA SERPL-MCNC: 1.7 NG/ML (ref 0–5)
CHLORIDE SERPL-SCNC: 103 MMOL/L (ref 95–110)
CHOLEST SERPL-MCNC: 240 MG/DL (ref 120–199)
CHOLEST/HDLC SERPL: 4.6 {RATIO} (ref 2–5)
CO2 SERPL-SCNC: 30 MMOL/L (ref 23–29)
COMPLEXED PSA SERPL-MCNC: 2.8 NG/ML (ref 0–4)
CREAT SERPL-MCNC: 1.2 MG/DL (ref 0.5–1.4)
DIFFERENTIAL METHOD: NORMAL
EOSINOPHIL # BLD AUTO: 0.1 K/UL (ref 0–0.5)
EOSINOPHIL NFR BLD: 2.4 % (ref 0–8)
ERYTHROCYTE [DISTWIDTH] IN BLOOD BY AUTOMATED COUNT: 13.6 % (ref 11.5–14.5)
EST. GFR  (AFRICAN AMERICAN): >60 ML/MIN/1.73 M^2
EST. GFR  (NON AFRICAN AMERICAN): >60 ML/MIN/1.73 M^2
ESTIMATED AVG GLUCOSE: 114 MG/DL (ref 68–131)
ESTIMATED AVG GLUCOSE: 114 MG/DL (ref 68–131)
GLUCOSE SERPL-MCNC: 111 MG/DL (ref 70–110)
HBA1C MFR BLD: 5.6 % (ref 4.5–6.2)
HBA1C MFR BLD: 5.6 % (ref 4.5–6.2)
HCT VFR BLD AUTO: 47.7 % (ref 40–54)
HDLC SERPL-MCNC: 52 MG/DL (ref 40–75)
HDLC SERPL: 21.7 % (ref 20–50)
HGB BLD-MCNC: 15.5 G/DL (ref 14–18)
IMM GRANULOCYTES # BLD AUTO: 0.01 K/UL (ref 0–0.04)
IMM GRANULOCYTES NFR BLD AUTO: 0.2 % (ref 0–0.5)
LDLC SERPL CALC-MCNC: 170.2 MG/DL (ref 63–159)
LYMPHOCYTES # BLD AUTO: 1.7 K/UL (ref 1–4.8)
LYMPHOCYTES NFR BLD: 32.6 % (ref 18–48)
MCH RBC QN AUTO: 27.2 PG (ref 27–31)
MCHC RBC AUTO-ENTMCNC: 32.5 G/DL (ref 32–36)
MCV RBC AUTO: 84 FL (ref 82–98)
MONOCYTES # BLD AUTO: 0.4 K/UL (ref 0.3–1)
MONOCYTES NFR BLD: 8.1 % (ref 4–15)
NEUTROPHILS # BLD AUTO: 2.9 K/UL (ref 1.8–7.7)
NEUTROPHILS NFR BLD: 56.1 % (ref 38–73)
NONHDLC SERPL-MCNC: 188 MG/DL
NRBC BLD-RTO: 0 /100 WBC
PLATELET # BLD AUTO: 196 K/UL (ref 150–450)
PMV BLD AUTO: 10.2 FL (ref 9.2–12.9)
POTASSIUM SERPL-SCNC: 3 MMOL/L (ref 3.5–5.1)
PROT SERPL-MCNC: 7.7 G/DL (ref 6–8.4)
RBC # BLD AUTO: 5.69 M/UL (ref 4.6–6.2)
SODIUM SERPL-SCNC: 142 MMOL/L (ref 136–145)
TRIGL SERPL-MCNC: 89 MG/DL (ref 30–150)
WBC # BLD AUTO: 5.09 K/UL (ref 3.9–12.7)

## 2021-10-15 PROCEDURE — 3074F PR MOST RECENT SYSTOLIC BLOOD PRESSURE < 130 MM HG: ICD-10-PCS | Mod: S$GLB,,, | Performed by: INTERNAL MEDICINE

## 2021-10-15 PROCEDURE — 83036 HEMOGLOBIN GLYCOSYLATED A1C: CPT | Performed by: INTERNAL MEDICINE

## 2021-10-15 PROCEDURE — 84153 ASSAY OF PSA TOTAL: CPT | Performed by: INTERNAL MEDICINE

## 2021-10-15 PROCEDURE — 99396 PR PREVENTIVE VISIT,EST,40-64: ICD-10-PCS | Mod: S$GLB,,, | Performed by: INTERNAL MEDICINE

## 2021-10-15 PROCEDURE — 1160F PR REVIEW ALL MEDS BY PRESCRIBER/CLIN PHARMACIST DOCUMENTED: ICD-10-PCS | Mod: S$GLB,,, | Performed by: INTERNAL MEDICINE

## 2021-10-15 PROCEDURE — 3008F BODY MASS INDEX DOCD: CPT | Mod: S$GLB,,, | Performed by: INTERNAL MEDICINE

## 2021-10-15 PROCEDURE — 36415 COLL VENOUS BLD VENIPUNCTURE: CPT | Performed by: INTERNAL MEDICINE

## 2021-10-15 PROCEDURE — 1160F RVW MEDS BY RX/DR IN RCRD: CPT | Mod: S$GLB,,, | Performed by: INTERNAL MEDICINE

## 2021-10-15 PROCEDURE — 3008F PR BODY MASS INDEX (BMI) DOCUMENTED: ICD-10-PCS | Mod: S$GLB,,, | Performed by: INTERNAL MEDICINE

## 2021-10-15 PROCEDURE — 80053 COMPREHEN METABOLIC PANEL: CPT | Performed by: INTERNAL MEDICINE

## 2021-10-15 PROCEDURE — 85025 COMPLETE CBC W/AUTO DIFF WBC: CPT | Performed by: INTERNAL MEDICINE

## 2021-10-15 PROCEDURE — 3079F PR MOST RECENT DIASTOLIC BLOOD PRESSURE 80-89 MM HG: ICD-10-PCS | Mod: S$GLB,,, | Performed by: INTERNAL MEDICINE

## 2021-10-15 PROCEDURE — 99396 PREV VISIT EST AGE 40-64: CPT | Mod: S$GLB,,, | Performed by: INTERNAL MEDICINE

## 2021-10-15 PROCEDURE — 82378 CARCINOEMBRYONIC ANTIGEN: CPT | Performed by: INTERNAL MEDICINE

## 2021-10-15 PROCEDURE — 3079F DIAST BP 80-89 MM HG: CPT | Mod: S$GLB,,, | Performed by: INTERNAL MEDICINE

## 2021-10-15 PROCEDURE — 80061 LIPID PANEL: CPT | Performed by: INTERNAL MEDICINE

## 2021-10-15 PROCEDURE — 3074F SYST BP LT 130 MM HG: CPT | Mod: S$GLB,,, | Performed by: INTERNAL MEDICINE

## 2021-10-15 RX ORDER — ATORVASTATIN CALCIUM 20 MG/1
20 TABLET, FILM COATED ORAL DAILY
Qty: 90 TABLET | Refills: 1 | Status: SHIPPED | OUTPATIENT
Start: 2021-10-15 | End: 2021-10-18 | Stop reason: SDUPTHER

## 2021-10-15 RX ORDER — AMLODIPINE BESYLATE 10 MG/1
10 TABLET ORAL DAILY
Qty: 90 TABLET | Refills: 1 | Status: SHIPPED | OUTPATIENT
Start: 2021-10-15 | End: 2022-03-18 | Stop reason: SDUPTHER

## 2021-10-15 RX ORDER — AZILSARTAN KAMEDOXOMIL AND CHLORTHALIDONE 40; 25 MG/1; MG/1
1 TABLET ORAL DAILY
Qty: 90 TABLET | Refills: 1 | Status: SHIPPED | OUTPATIENT
Start: 2021-10-15 | End: 2022-03-18 | Stop reason: SDUPTHER

## 2021-10-15 RX ORDER — LABETALOL 200 MG/1
200 TABLET, FILM COATED ORAL 2 TIMES DAILY
Qty: 180 TABLET | Refills: 1 | Status: SHIPPED | OUTPATIENT
Start: 2021-10-15 | End: 2022-03-18 | Stop reason: SDUPTHER

## 2021-10-15 RX ORDER — POTASSIUM CHLORIDE 20 MEQ/1
20 TABLET, EXTENDED RELEASE ORAL 3 TIMES DAILY
Qty: 270 TABLET | Refills: 1 | Status: SHIPPED | OUTPATIENT
Start: 2021-10-15 | End: 2022-08-19 | Stop reason: SDUPTHER

## 2021-10-18 DIAGNOSIS — E78.00 PURE HYPERCHOLESTEROLEMIA: ICD-10-CM

## 2021-10-18 RX ORDER — ATORVASTATIN CALCIUM 40 MG/1
40 TABLET, FILM COATED ORAL DAILY
Qty: 90 TABLET | Refills: 1 | Status: SHIPPED | OUTPATIENT
Start: 2021-10-18 | End: 2022-03-18 | Stop reason: SDUPTHER

## 2021-10-28 ENCOUNTER — OFFICE VISIT (OUTPATIENT)
Dept: HEMATOLOGY/ONCOLOGY | Facility: CLINIC | Age: 56
End: 2021-10-28
Payer: COMMERCIAL

## 2021-10-28 VITALS
DIASTOLIC BLOOD PRESSURE: 89 MMHG | RESPIRATION RATE: 18 BRPM | WEIGHT: 246 LBS | HEIGHT: 68 IN | BODY MASS INDEX: 37.28 KG/M2 | SYSTOLIC BLOOD PRESSURE: 145 MMHG | HEART RATE: 88 BPM

## 2021-10-28 DIAGNOSIS — C18.2 PRIMARY ADENOCARCINOMA OF ASCENDING COLON: Primary | ICD-10-CM

## 2021-10-28 DIAGNOSIS — R91.1 LUNG NODULE: ICD-10-CM

## 2021-10-28 PROCEDURE — 1160F RVW MEDS BY RX/DR IN RCRD: CPT | Mod: S$GLB,,, | Performed by: INTERNAL MEDICINE

## 2021-10-28 PROCEDURE — 3044F PR MOST RECENT HEMOGLOBIN A1C LEVEL <7.0%: ICD-10-PCS | Mod: S$GLB,,, | Performed by: INTERNAL MEDICINE

## 2021-10-28 PROCEDURE — 3077F PR MOST RECENT SYSTOLIC BLOOD PRESSURE >= 140 MM HG: ICD-10-PCS | Mod: S$GLB,,, | Performed by: INTERNAL MEDICINE

## 2021-10-28 PROCEDURE — 1160F PR REVIEW ALL MEDS BY PRESCRIBER/CLIN PHARMACIST DOCUMENTED: ICD-10-PCS | Mod: S$GLB,,, | Performed by: INTERNAL MEDICINE

## 2021-10-28 PROCEDURE — 3077F SYST BP >= 140 MM HG: CPT | Mod: S$GLB,,, | Performed by: INTERNAL MEDICINE

## 2021-10-28 PROCEDURE — 99214 PR OFFICE/OUTPT VISIT, EST, LEVL IV, 30-39 MIN: ICD-10-PCS | Mod: S$GLB,,, | Performed by: INTERNAL MEDICINE

## 2021-10-28 PROCEDURE — 3008F BODY MASS INDEX DOCD: CPT | Mod: S$GLB,,, | Performed by: INTERNAL MEDICINE

## 2021-10-28 PROCEDURE — 3008F PR BODY MASS INDEX (BMI) DOCUMENTED: ICD-10-PCS | Mod: S$GLB,,, | Performed by: INTERNAL MEDICINE

## 2021-10-28 PROCEDURE — 99214 OFFICE O/P EST MOD 30 MIN: CPT | Mod: S$GLB,,, | Performed by: INTERNAL MEDICINE

## 2021-10-28 PROCEDURE — 3079F DIAST BP 80-89 MM HG: CPT | Mod: S$GLB,,, | Performed by: INTERNAL MEDICINE

## 2021-10-28 PROCEDURE — 3044F HG A1C LEVEL LT 7.0%: CPT | Mod: S$GLB,,, | Performed by: INTERNAL MEDICINE

## 2021-10-28 PROCEDURE — 3079F PR MOST RECENT DIASTOLIC BLOOD PRESSURE 80-89 MM HG: ICD-10-PCS | Mod: S$GLB,,, | Performed by: INTERNAL MEDICINE

## 2021-11-05 ENCOUNTER — CLINICAL SUPPORT (OUTPATIENT)
Dept: URGENT CARE | Facility: CLINIC | Age: 56
End: 2021-11-05

## 2021-11-05 PROCEDURE — 99499 PR PHYSICAL - DOT/CDL: ICD-10-PCS | Mod: S$GLB,,, | Performed by: EMERGENCY MEDICINE

## 2021-11-05 PROCEDURE — 99499 UNLISTED E&M SERVICE: CPT | Mod: S$GLB,,, | Performed by: EMERGENCY MEDICINE

## 2021-12-03 ENCOUNTER — HOSPITAL ENCOUNTER (OUTPATIENT)
Dept: RADIOLOGY | Facility: HOSPITAL | Age: 56
Discharge: HOME | End: 2021-12-03
Attending: INTERNAL MEDICINE
Payer: COMMERCIAL

## 2021-12-03 DIAGNOSIS — R91.1 LUNG NODULE: ICD-10-CM

## 2021-12-03 DIAGNOSIS — C18.2 PRIMARY ADENOCARCINOMA OF ASCENDING COLON: ICD-10-CM

## 2021-12-03 LAB
CREAT SERPL-MCNC: 1.3 MG/DL (ref 0.5–1.4)
SAMPLE: NORMAL

## 2021-12-03 PROCEDURE — 25500020 PHARM REV CODE 255: Mod: PO | Performed by: INTERNAL MEDICINE

## 2021-12-03 PROCEDURE — 71260 CT THORAX DX C+: CPT | Mod: TC,PO

## 2021-12-03 PROCEDURE — 82565 ASSAY OF CREATININE: CPT | Mod: PO

## 2021-12-03 RX ADMIN — IOHEXOL 100 ML: 350 INJECTION, SOLUTION INTRAVENOUS at 01:12

## 2021-12-06 ENCOUNTER — TELEPHONE (OUTPATIENT)
Dept: HEMATOLOGY/ONCOLOGY | Facility: CLINIC | Age: 56
End: 2021-12-06
Payer: COMMERCIAL

## 2022-01-19 ENCOUNTER — OFFICE VISIT (OUTPATIENT)
Dept: FAMILY MEDICINE | Facility: CLINIC | Age: 57
End: 2022-01-19
Payer: COMMERCIAL

## 2022-01-19 VITALS
DIASTOLIC BLOOD PRESSURE: 80 MMHG | TEMPERATURE: 97 F | SYSTOLIC BLOOD PRESSURE: 118 MMHG | HEART RATE: 83 BPM | HEIGHT: 68 IN | BODY MASS INDEX: 36.53 KG/M2 | OXYGEN SATURATION: 96 % | WEIGHT: 241 LBS

## 2022-01-19 DIAGNOSIS — U07.1 COVID-19: ICD-10-CM

## 2022-01-19 DIAGNOSIS — J06.9 UPPER RESPIRATORY TRACT INFECTION, UNSPECIFIED TYPE: Primary | ICD-10-CM

## 2022-01-19 PROBLEM — E66.9 CLASS 2 OBESITY WITH BODY MASS INDEX (BMI) OF 36.0 TO 36.9 IN ADULT: Status: ACTIVE | Noted: 2022-01-19

## 2022-01-19 PROBLEM — E66.812 CLASS 2 OBESITY WITH BODY MASS INDEX (BMI) OF 36.0 TO 36.9 IN ADULT: Status: ACTIVE | Noted: 2022-01-19

## 2022-01-19 LAB
CTP QC/QA: YES
SARS-COV-2 RDRP RESP QL NAA+PROBE: POSITIVE

## 2022-01-19 PROCEDURE — U0002 COVID-19 LAB TEST NON-CDC: HCPCS | Mod: QW,S$GLB,, | Performed by: INTERNAL MEDICINE

## 2022-01-19 PROCEDURE — 3079F DIAST BP 80-89 MM HG: CPT | Mod: S$GLB,,, | Performed by: INTERNAL MEDICINE

## 2022-01-19 PROCEDURE — U0002: ICD-10-PCS | Mod: QW,S$GLB,, | Performed by: INTERNAL MEDICINE

## 2022-01-19 PROCEDURE — 3074F PR MOST RECENT SYSTOLIC BLOOD PRESSURE < 130 MM HG: ICD-10-PCS | Mod: S$GLB,,, | Performed by: INTERNAL MEDICINE

## 2022-01-19 PROCEDURE — 3079F PR MOST RECENT DIASTOLIC BLOOD PRESSURE 80-89 MM HG: ICD-10-PCS | Mod: S$GLB,,, | Performed by: INTERNAL MEDICINE

## 2022-01-19 PROCEDURE — 3008F PR BODY MASS INDEX (BMI) DOCUMENTED: ICD-10-PCS | Mod: S$GLB,,, | Performed by: INTERNAL MEDICINE

## 2022-01-19 PROCEDURE — 3074F SYST BP LT 130 MM HG: CPT | Mod: S$GLB,,, | Performed by: INTERNAL MEDICINE

## 2022-01-19 PROCEDURE — 3008F BODY MASS INDEX DOCD: CPT | Mod: S$GLB,,, | Performed by: INTERNAL MEDICINE

## 2022-01-19 PROCEDURE — 99213 PR OFFICE/OUTPT VISIT, EST, LEVL III, 20-29 MIN: ICD-10-PCS | Mod: S$GLB,,, | Performed by: INTERNAL MEDICINE

## 2022-01-19 PROCEDURE — 99213 OFFICE O/P EST LOW 20 MIN: CPT | Mod: S$GLB,,, | Performed by: INTERNAL MEDICINE

## 2022-01-19 RX ORDER — MAGNESIUM 30 MG
1 TABLET ORAL ONCE
COMMUNITY

## 2022-01-19 RX ORDER — IBUPROFEN 100 MG/5ML
1000 SUSPENSION, ORAL (FINAL DOSE FORM) ORAL DAILY
COMMUNITY

## 2022-01-19 NOTE — PROGRESS NOTES
Subjective:       Patient ID: iVnce Kwon is a 56 y.o. male.    Chief Complaint: Cough and Nasal Congestion    URI   This is a new problem. The current episode started in the past 7 days. Maximum temperature: chills but no fever noted. Associated symptoms include congestion, coughing, headaches, rhinorrhea and a sore throat. Pertinent negatives include no abdominal pain, chest pain, diarrhea, dysuria, ear pain, nausea, neck pain, rash, sinus pain, sneezing, vomiting or wheezing. Associated symptoms comments: Has noted some change in taste and smell but not completely gone. Treatments tried: Coricidin HBP. The treatment provided mild relief.     Review of Systems   Constitutional: Positive for chills and fatigue. Negative for activity change, appetite change, diaphoresis, fever and unexpected weight change.   HENT: Positive for congestion, rhinorrhea and sore throat. Negative for ear discharge, ear pain, hearing loss, nosebleeds, postnasal drip, sinus pressure, sinus pain, sneezing, tinnitus, trouble swallowing and voice change.    Eyes: Negative for photophobia, pain, discharge, redness, itching and visual disturbance.   Respiratory: Positive for cough. Negative for apnea, choking, chest tightness, shortness of breath and wheezing.    Cardiovascular: Negative for chest pain, palpitations and leg swelling.   Gastrointestinal: Negative for abdominal distention, abdominal pain, blood in stool, constipation, diarrhea, nausea and vomiting.   Endocrine: Negative for cold intolerance, heat intolerance, polydipsia and polyuria.   Genitourinary: Negative for decreased urine volume, difficulty urinating, dysuria, enuresis, flank pain, frequency, genital sores, hematuria, penile discharge, penile pain, scrotal swelling, testicular pain and urgency.   Musculoskeletal: Negative for arthralgias, back pain, gait problem, joint swelling, myalgias, neck pain and neck stiffness.   Skin: Negative for rash and wound.    Allergic/Immunologic: Negative for environmental allergies, food allergies and immunocompromised state.   Neurological: Positive for headaches. Negative for dizziness, tremors, seizures, syncope, facial asymmetry, speech difficulty, weakness, light-headedness and numbness.   Hematological: Negative for adenopathy. Does not bruise/bleed easily.   Psychiatric/Behavioral: Negative for confusion, decreased concentration, hallucinations, self-injury, sleep disturbance and suicidal ideas. The patient is not nervous/anxious.        Past Medical History:   Diagnosis Date    Colon tumor     Hyperlipidemia     Hypertension     Hypokalemia 3/1/2018    Multiple pulmonary nodules 8/10/2018      Past Surgical History:   Procedure Laterality Date    COLON SURGERY  03/07/2018    COLONOSCOPY      FINGER SURGERY      RIGHT COLECTOMY Right 03/07/2018           Family History   Problem Relation Age of Onset    Diabetes Mother     Heart disease Mother     Diabetes Sister     Breast cancer Sister     Diabetes Maternal Aunt     Diabetes Maternal Grandmother     Heart disease Maternal Grandmother     Heart disease Maternal Grandfather        Social History     Socioeconomic History    Marital status: Single   Occupational History    Occupation: shipping and recieving   Tobacco Use    Smoking status: Former Smoker    Smokeless tobacco: Never Used   Substance and Sexual Activity    Alcohol use: Yes     Comment: occ    Drug use: No    Sexual activity: Yes     Partners: Female   Social History Narrative    Lives alone       Current Outpatient Medications   Medication Sig Dispense Refill    amLODIPine (NORVASC) 10 MG tablet Take 1 tablet (10 mg total) by mouth once daily. 90 tablet 1    ascorbic acid, vitamin C, (VITAMIN C) 1000 MG tablet Take 1,000 mg by mouth once daily.      aspirin (ECOTRIN) 81 MG EC tablet Take 81 mg by mouth.      atorvastatin (LIPITOR) 40 MG tablet Take 1 tablet (40 mg total) by  "mouth once daily. Take one tablet daily. 90 tablet 1    azilsartan med-chlorthalidone (EDARBYCLOR) 40-25 mg Tab Take 1 tablet by mouth once daily. 90 tablet 1    labetaloL (NORMODYNE) 200 MG tablet Take 1 tablet (200 mg total) by mouth 2 (two) times daily. 180 tablet 1    magnesium 30 mg Tab Take 1 tablet by mouth once.      potassium chloride SA (K-DUR,KLOR-CON) 20 MEQ tablet Take 1 tablet (20 mEq total) by mouth 3 (three) times daily. 270 tablet 1    zinc 50 mg Tab Take by mouth.      meclizine (ANTIVERT) 25 mg tablet TAKE ONE TABLET BY MOUTH THREE TIMES DAILY AS NEEDED For dizziness (Patient not taking: Reported on 1/19/2022) 30 tablet 1     No current facility-administered medications for this visit.       Review of patient's allergies indicates:  No Known Allergies  Objective:      Blood pressure 118/80, pulse 83, temperature 96.5 °F (35.8 °C), temperature source Temporal, height 5' 8" (1.727 m), weight 109.3 kg (241 lb), SpO2 96 %. Body mass index is 36.64 kg/m².   Physical Exam  Vitals and nursing note reviewed.   Constitutional:       General: He is not in acute distress.     Appearance: He is well-developed. He is obese. He is not ill-appearing, toxic-appearing or diaphoretic.   HENT:      Head: Normocephalic and atraumatic.   Eyes:      General: No scleral icterus.        Right eye: No discharge.         Left eye: No discharge.      Conjunctiva/sclera: Conjunctivae normal.   Neck:      Vascular: No carotid bruit.   Cardiovascular:      Rate and Rhythm: Normal rate and regular rhythm.      Heart sounds: Normal heart sounds. No murmur heard.      Pulmonary:      Effort: Pulmonary effort is normal. No respiratory distress.      Breath sounds: Normal breath sounds. No decreased breath sounds, wheezing, rhonchi or rales.   Abdominal:      General: There is no distension.      Palpations: Abdomen is soft.      Tenderness: There is no abdominal tenderness. There is no guarding or rebound.   Musculoskeletal: "      Right lower leg: No edema.      Left lower leg: No edema.   Skin:     General: Skin is warm and dry.   Neurological:      Mental Status: He is alert.      Motor: No tremor.   Psychiatric:         Mood and Affect: Mood normal.         Speech: Speech normal.         Behavior: Behavior normal.           Office Visit on 01/19/2022   Component Date Value Ref Range Status    POC Rapid COVID 01/19/2022 Positive* Negative Final     Acceptable 01/19/2022 Yes   Final     COVID risk score 3  Assessment:       1. Upper respiratory tract infection, unspecified type    2. COVID-19        Plan:       Vince was seen today for cough and nasal congestion.    Diagnoses and all orders for this visit:    Upper respiratory tract infection, unspecified type  -     POCT COVID-19 Rapid Screening    COVID-19  Comments:  Eligible for mononclonal antibody infusion.  Discussed quarantine guidelines.  Orders:  -     Ambulatory referral/consult to [facility]; Future

## 2022-01-19 NOTE — PATIENT INSTRUCTIONS
Your test was POSITIVE for COVID-19 (coronavirus).       Please isolate yourself at home.  You may leave home and/or return to work once the following conditions are met:    If you were not hospitalized and are not moderately to severely immunocompromised:    More than 5 days since symptoms first appeared AND   More than 24 hours fever free without medications AND   Symptoms are improving   Continue to wear a mask around others for 5 additional days.    If you were hospitalized OR are moderately to severely immunocompromised:   More than 20 days since symptoms first appeared   More than 24 hours fever free without medications   Symptoms have improved    If you had no symptoms but tested positive:   More than 5 days since the date of the first positive test (20 days if moderately to severely immunocompromised). If you develop symptoms, then use the guidelines above.   Continue to wear a mask around others for 5 additional days.      Contact Tracing    As one of the next steps, you will receive a call or text from the [place] Department of Health ([facility]) COVID-19 Tracing Team. See the contact information below so you know not to ignore the health departments call. It is important that you contact them back immediately so they can help.      Contact Tracer Number:  [phone]  Caller ID for most carriers: [facility]     What is contact tracing?  · Contact tracing is a process that helps identify everyone who has been in close contact with an infected person. Contact tracers let those people know they may have been exposed and guide them on next steps. Confidentiality is important for everyone; no one will be told who may have exposed them to the virus.  · Your involvement is important. The more we know about where and how this virus is spreading, the better chance we have at stopping it from spreading further.  What does exposure mean?  · Exposure means you have been within 6 feet for more than 15  minutes with a person who has or had COVID-19.  What kind of questions do the contact tracers ask?  · A contact tracer will confirm your basic contact information including name, address, phone number, and next of kin, as well as asking about any symptoms you may have had. Theyll also ask you how you think you may have gotten sick, such as places where you may have been exposed to the virus, and people you were with. Those names will never be shared with anyone outside of that call, and will only be used to help trace and stop the spread of the virus.   I have privacy concerns. How will the state use my information?  · Your privacy about your health is important. All calls are completed using call centers that use the appropriate health privacy protection measures (HIPAA compliance), meaning that your patient information is safe. No one will ever ask you any questions related to immigration status. Your health comes first.   Do I have to participate?  · You do not have to participate, but we strongly encourage you to. Contact tracing can help us catch and control new outbreaks as theyre developing to keep your friends and family safe.   What if I dont hear from anyone?  · If you dont receive a call within 24 hours, you can call the number above right away to inquire about your status. That line is open from 8:00 am - 8:00 p.m., 7 days a week.  Contact tracing saves lives! Together, we have the power to beat this virus and keep our loved ones and neighbors safe.    For more information see CDC link below.      https://www.cdc.gov/coronavirus/2019-ncov/hcp/guidance-prevent-spread.html#precautions        Sources:  CDC, [place] [facility] and [facility]           Sincerely,     Jose Hoang Jr, MD

## 2022-01-20 ENCOUNTER — TELEPHONE (OUTPATIENT)
Dept: HEMATOLOGY/ONCOLOGY | Facility: CLINIC | Age: 57
End: 2022-01-20
Payer: COMMERCIAL

## 2022-01-20 NOTE — TELEPHONE ENCOUNTER
----- Message from Carlo Welch MD sent at 1/19/2022 12:30 PM CST -----  Let PCP know he is positive

## 2022-01-20 NOTE — TELEPHONE ENCOUNTER
Spoke with Alyssa at Dr. Severino office and relayed message that pt is positive for Covid.  Per Alyssa, they tested pt.  Zoran

## 2022-01-21 ENCOUNTER — INFUSION (OUTPATIENT)
Dept: INFECTIOUS DISEASES | Facility: HOSPITAL | Age: 57
End: 2022-01-21
Attending: INTERNAL MEDICINE
Payer: COMMERCIAL

## 2022-01-21 VITALS
SYSTOLIC BLOOD PRESSURE: 157 MMHG | TEMPERATURE: 98 F | DIASTOLIC BLOOD PRESSURE: 83 MMHG | HEART RATE: 67 BPM | RESPIRATION RATE: 18 BRPM | OXYGEN SATURATION: 96 %

## 2022-01-21 DIAGNOSIS — U07.1 COVID-19: ICD-10-CM

## 2022-01-21 PROCEDURE — 25000003 PHARM REV CODE 250: Performed by: INTERNAL MEDICINE

## 2022-01-21 RX ORDER — DIPHENHYDRAMINE HYDROCHLORIDE 50 MG/ML
25 INJECTION INTRAMUSCULAR; INTRAVENOUS ONCE AS NEEDED
Status: DISCONTINUED | OUTPATIENT
Start: 2022-01-21 | End: 2022-03-18

## 2022-01-21 RX ORDER — ALBUTEROL SULFATE 90 UG/1
2 AEROSOL, METERED RESPIRATORY (INHALATION)
Status: DISCONTINUED | OUTPATIENT
Start: 2022-01-21 | End: 2022-03-18

## 2022-01-21 RX ORDER — SODIUM CHLORIDE 0.9 % (FLUSH) 0.9 %
10 SYRINGE (ML) INJECTION
Status: DISCONTINUED | OUTPATIENT
Start: 2022-01-21 | End: 2022-03-18

## 2022-01-21 RX ORDER — EPINEPHRINE 0.3 MG/.3ML
0.3 INJECTION SUBCUTANEOUS
Status: DISCONTINUED | OUTPATIENT
Start: 2022-01-21 | End: 2022-03-18

## 2022-01-21 RX ORDER — ONDANSETRON 4 MG/1
4 TABLET, ORALLY DISINTEGRATING ORAL ONCE AS NEEDED
Status: DISCONTINUED | OUTPATIENT
Start: 2022-01-21 | End: 2022-03-18

## 2022-01-21 RX ORDER — ACETAMINOPHEN 325 MG/1
650 TABLET ORAL ONCE AS NEEDED
Status: DISCONTINUED | OUTPATIENT
Start: 2022-01-21 | End: 2022-03-18

## 2022-01-21 RX ADMIN — SODIUM CHLORIDE: 0.9 INJECTION, SOLUTION INTRAVENOUS at 09:01

## 2022-01-21 NOTE — PATIENT INSTRUCTIONS
Continue to isolate for a full 5 days from either the start of symptoms or positive Covid test. You also will need to be free of fever for 24 hours before getting around other people. Please continue to wear a mask for an additional 5 days when around others.     If you develop any unsual symptoms after leaving infusion or feel like you are getting worse, please call the [facility]-Call RN at [phone].    If you DID NOT receive a vaccine, do not get one within the next 90 days, per CDC guidelines, this includes any booster vaccines as well.    Continue to monitor and treat any fevers or congestion. Symptoms should begin to improve over the next 24-48 hours. Hydrate well with non-caffeinated beverages, eat every 3-4 hours, and move as much as you can.    In addition to your AVS, you were provided with medication fact sheets regarding Regeneron (casirivimab-imdevimab) which is the medication you received today.

## 2022-01-21 NOTE — PLAN OF CARE
Problem: Adult Inpatient Plan of Care  Goal: Plan of Care Review  Outcome: Ongoing, Progressing  Flowsheets (Taken 1/21/2022 1118)  Plan of Care Reviewed With: patient     Patient tolerated treatment without any reactions. Patient observed for 1 hour following infusion completion. IV removed. AVS and medication information provided. Patient ambulated upon discharge per self.

## 2022-03-18 ENCOUNTER — OFFICE VISIT (OUTPATIENT)
Dept: FAMILY MEDICINE | Facility: CLINIC | Age: 57
End: 2022-03-18
Payer: COMMERCIAL

## 2022-03-18 ENCOUNTER — LAB VISIT (OUTPATIENT)
Dept: LAB | Facility: HOSPITAL | Age: 57
End: 2022-03-18
Attending: INTERNAL MEDICINE
Payer: COMMERCIAL

## 2022-03-18 VITALS
TEMPERATURE: 97 F | OXYGEN SATURATION: 97 % | DIASTOLIC BLOOD PRESSURE: 68 MMHG | SYSTOLIC BLOOD PRESSURE: 124 MMHG | HEART RATE: 72 BPM | BODY MASS INDEX: 36.22 KG/M2 | WEIGHT: 239 LBS | HEIGHT: 68 IN

## 2022-03-18 DIAGNOSIS — I10 ESSENTIAL HYPERTENSION: Primary | ICD-10-CM

## 2022-03-18 DIAGNOSIS — C18.2 PRIMARY ADENOCARCINOMA OF ASCENDING COLON: ICD-10-CM

## 2022-03-18 DIAGNOSIS — E78.00 PURE HYPERCHOLESTEROLEMIA: ICD-10-CM

## 2022-03-18 LAB
BASOPHILS # BLD AUTO: 0.01 K/UL (ref 0–0.2)
BASOPHILS NFR BLD: 0.2 % (ref 0–1.9)
CEA SERPL-MCNC: 1.6 NG/ML (ref 0–5)
CHOLEST SERPL-MCNC: 165 MG/DL (ref 120–199)
CHOLEST/HDLC SERPL: 3.4 {RATIO} (ref 2–5)
DIFFERENTIAL METHOD: ABNORMAL
EOSINOPHIL # BLD AUTO: 0.1 K/UL (ref 0–0.5)
EOSINOPHIL NFR BLD: 1.6 % (ref 0–8)
ERYTHROCYTE [DISTWIDTH] IN BLOOD BY AUTOMATED COUNT: 14 % (ref 11.5–14.5)
HCT VFR BLD AUTO: 44.4 % (ref 40–54)
HDLC SERPL-MCNC: 49 MG/DL (ref 40–75)
HDLC SERPL: 29.7 % (ref 20–50)
HGB BLD-MCNC: 14.4 G/DL (ref 14–18)
IMM GRANULOCYTES # BLD AUTO: 0.01 K/UL (ref 0–0.04)
IMM GRANULOCYTES NFR BLD AUTO: 0.2 % (ref 0–0.5)
LDLC SERPL CALC-MCNC: 105 MG/DL (ref 63–159)
LYMPHOCYTES # BLD AUTO: 1.7 K/UL (ref 1–4.8)
LYMPHOCYTES NFR BLD: 38.7 % (ref 18–48)
MCH RBC QN AUTO: 26.7 PG (ref 27–31)
MCHC RBC AUTO-ENTMCNC: 32.4 G/DL (ref 32–36)
MCV RBC AUTO: 82 FL (ref 82–98)
MONOCYTES # BLD AUTO: 0.4 K/UL (ref 0.3–1)
MONOCYTES NFR BLD: 9.7 % (ref 4–15)
NEUTROPHILS # BLD AUTO: 2.2 K/UL (ref 1.8–7.7)
NEUTROPHILS NFR BLD: 49.6 % (ref 38–73)
NONHDLC SERPL-MCNC: 116 MG/DL
NRBC BLD-RTO: 0 /100 WBC
PLATELET # BLD AUTO: 194 K/UL (ref 150–450)
PMV BLD AUTO: 10.2 FL (ref 9.2–12.9)
RBC # BLD AUTO: 5.4 M/UL (ref 4.6–6.2)
TRIGL SERPL-MCNC: 55 MG/DL (ref 30–150)
WBC # BLD AUTO: 4.34 K/UL (ref 3.9–12.7)

## 2022-03-18 PROCEDURE — 99213 OFFICE O/P EST LOW 20 MIN: CPT | Mod: S$GLB,,, | Performed by: INTERNAL MEDICINE

## 2022-03-18 PROCEDURE — 85025 COMPLETE CBC W/AUTO DIFF WBC: CPT | Performed by: INTERNAL MEDICINE

## 2022-03-18 PROCEDURE — 3078F PR MOST RECENT DIASTOLIC BLOOD PRESSURE < 80 MM HG: ICD-10-PCS | Mod: S$GLB,,, | Performed by: INTERNAL MEDICINE

## 2022-03-18 PROCEDURE — 3078F DIAST BP <80 MM HG: CPT | Mod: S$GLB,,, | Performed by: INTERNAL MEDICINE

## 2022-03-18 PROCEDURE — 36415 COLL VENOUS BLD VENIPUNCTURE: CPT | Performed by: INTERNAL MEDICINE

## 2022-03-18 PROCEDURE — 3008F PR BODY MASS INDEX (BMI) DOCUMENTED: ICD-10-PCS | Mod: S$GLB,,, | Performed by: INTERNAL MEDICINE

## 2022-03-18 PROCEDURE — 3074F PR MOST RECENT SYSTOLIC BLOOD PRESSURE < 130 MM HG: ICD-10-PCS | Mod: S$GLB,,, | Performed by: INTERNAL MEDICINE

## 2022-03-18 PROCEDURE — 3008F BODY MASS INDEX DOCD: CPT | Mod: S$GLB,,, | Performed by: INTERNAL MEDICINE

## 2022-03-18 PROCEDURE — 80061 LIPID PANEL: CPT | Performed by: INTERNAL MEDICINE

## 2022-03-18 PROCEDURE — 3074F SYST BP LT 130 MM HG: CPT | Mod: S$GLB,,, | Performed by: INTERNAL MEDICINE

## 2022-03-18 PROCEDURE — 99213 PR OFFICE/OUTPT VISIT, EST, LEVL III, 20-29 MIN: ICD-10-PCS | Mod: S$GLB,,, | Performed by: INTERNAL MEDICINE

## 2022-03-18 PROCEDURE — 82378 CARCINOEMBRYONIC ANTIGEN: CPT | Performed by: INTERNAL MEDICINE

## 2022-03-18 RX ORDER — AMLODIPINE BESYLATE 10 MG/1
10 TABLET ORAL DAILY
Qty: 90 TABLET | Refills: 1 | Status: SHIPPED | OUTPATIENT
Start: 2022-03-18 | End: 2022-08-19 | Stop reason: SDUPTHER

## 2022-03-18 RX ORDER — LABETALOL 200 MG/1
200 TABLET, FILM COATED ORAL 2 TIMES DAILY
Qty: 180 TABLET | Refills: 1 | Status: SHIPPED | OUTPATIENT
Start: 2022-03-18 | End: 2022-08-19 | Stop reason: SDUPTHER

## 2022-03-18 RX ORDER — ATORVASTATIN CALCIUM 40 MG/1
40 TABLET, FILM COATED ORAL DAILY
Qty: 90 TABLET | Refills: 1 | Status: SHIPPED | OUTPATIENT
Start: 2022-03-18 | End: 2022-08-19 | Stop reason: SDUPTHER

## 2022-03-18 RX ORDER — AZILSARTAN KAMEDOXOMIL AND CHLORTHALIDONE 40; 25 MG/1; MG/1
1 TABLET ORAL DAILY
Qty: 90 TABLET | Refills: 1 | Status: SHIPPED | OUTPATIENT
Start: 2022-03-18 | End: 2022-08-19 | Stop reason: SDUPTHER

## 2022-03-18 NOTE — PROGRESS NOTES
Subjective:       Patient ID: Vince Kwon is a 56 y.o. male.    Chief Complaint: Hypertension (5 months follow up) and Hyperlipidemia    Here for routine follow up.  Followed by Heme/Onc and GI for h/o colon cancer.  He has f/u with Heme/Onc next week so should be getting labs for that.  His colonoscopy is due in June.    Hypertension  This is a chronic problem. The problem is controlled (120s/80s at home). Pertinent negatives include no anxiety, chest pain, headaches, malaise/fatigue, neck pain, palpitations, peripheral edema or shortness of breath. Risk factors for coronary artery disease include male gender, obesity and dyslipidemia. Past treatments include beta blockers, calcium channel blockers, diuretics and angiotensin blockers. The current treatment provides significant improvement. Compliance problems: was getting light headed so cut labetolol back to half tablet BID and has been better.    Hyperlipidemia  This is a chronic problem. The problem is uncontrolled. Recent lipid tests were reviewed and are high. Exacerbating diseases include obesity. Pertinent negatives include no chest pain, myalgias or shortness of breath. Current antihyperlipidemic treatment includes statins. The current treatment provides mild improvement of lipids. There are no compliance problems.      Review of Systems   Constitutional: Negative for activity change, appetite change, chills, diaphoresis, fatigue, fever, malaise/fatigue and unexpected weight change.   HENT: Negative for congestion, ear discharge, ear pain, hearing loss, nosebleeds, postnasal drip, rhinorrhea, sinus pressure, sinus pain, sneezing, sore throat, tinnitus, trouble swallowing and voice change.    Eyes: Negative for photophobia, pain, discharge, redness, itching and visual disturbance.   Respiratory: Negative for apnea, cough, choking, chest tightness, shortness of breath and wheezing.    Cardiovascular: Negative for chest pain, palpitations and leg  swelling.   Gastrointestinal: Negative for abdominal distention, abdominal pain, blood in stool, constipation, diarrhea, nausea and vomiting.   Endocrine: Negative for cold intolerance, heat intolerance, polydipsia and polyuria.   Genitourinary: Negative for decreased urine volume, difficulty urinating, dysuria, enuresis, flank pain, frequency, genital sores, hematuria, penile discharge, penile pain, scrotal swelling, testicular pain and urgency.   Musculoskeletal: Negative for arthralgias, back pain, gait problem, joint swelling, myalgias, neck pain and neck stiffness.   Skin: Negative for rash and wound.   Allergic/Immunologic: Negative for environmental allergies, food allergies and immunocompromised state.   Neurological: Negative for dizziness, tremors, seizures, syncope, facial asymmetry, speech difficulty, weakness, light-headedness, numbness and headaches.   Hematological: Negative for adenopathy. Does not bruise/bleed easily.   Psychiatric/Behavioral: Negative for confusion, decreased concentration, hallucinations, self-injury, sleep disturbance and suicidal ideas. The patient is not nervous/anxious.        Past Medical History:   Diagnosis Date    Colon tumor     Hyperlipidemia     Hypertension     Hypokalemia 3/1/2018    Multiple pulmonary nodules 8/10/2018      Past Surgical History:   Procedure Laterality Date    COLON SURGERY  03/07/2018    COLONOSCOPY      FINGER SURGERY      RIGHT COLECTOMY Right 03/07/2018           Family History   Problem Relation Age of Onset    Diabetes Mother     Heart disease Mother     Diabetes Sister     Breast cancer Sister     Diabetes Maternal Aunt     Diabetes Maternal Grandmother     Heart disease Maternal Grandmother     Heart disease Maternal Grandfather        Social History     Socioeconomic History    Marital status: Single   Occupational History    Occupation: shipping and recieving   Tobacco Use    Smoking status: Former Smoker     "Smokeless tobacco: Never Used   Substance and Sexual Activity    Alcohol use: Yes     Comment: occ    Drug use: No    Sexual activity: Yes     Partners: Female   Social History Narrative    Lives alone       Current Outpatient Medications   Medication Sig Dispense Refill    ascorbic acid, vitamin C, (VITAMIN C) 1000 MG tablet Take 1,000 mg by mouth once daily.      aspirin (ECOTRIN) 81 MG EC tablet Take 81 mg by mouth.      magnesium 30 mg Tab Take 1 tablet by mouth once.      meclizine (ANTIVERT) 25 mg tablet TAKE ONE TABLET BY MOUTH THREE TIMES DAILY AS NEEDED For dizziness 30 tablet 1    potassium chloride SA (K-DUR,KLOR-CON) 20 MEQ tablet Take 1 tablet (20 mEq total) by mouth 3 (three) times daily. 270 tablet 1    zinc 50 mg Tab Take by mouth.      amLODIPine (NORVASC) 10 MG tablet Take 1 tablet (10 mg total) by mouth once daily. 90 tablet 1    atorvastatin (LIPITOR) 40 MG tablet Take 1 tablet (40 mg total) by mouth once daily. Take one tablet daily. 90 tablet 1    azilsartan med-chlorthalidone (EDARBYCLOR) 40-25 mg Tab Take 1 tablet by mouth once daily. 90 tablet 1    labetaloL (NORMODYNE) 200 MG tablet Take 1 tablet (200 mg total) by mouth 2 (two) times daily. 180 tablet 1     No current facility-administered medications for this visit.       Review of patient's allergies indicates:  No Known Allergies  Objective:    HPI     Hypertension      Additional comments: 5 months follow up          Last edited by Jose R Willis MA on 3/18/2022  9:17 AM. (History)      Blood pressure 124/68, pulse 72, temperature 97.4 °F (36.3 °C), temperature source Temporal, height 5' 8" (1.727 m), weight 108.4 kg (239 lb), SpO2 97 %. Body mass index is 36.34 kg/m².   Physical Exam  Vitals and nursing note reviewed.   Constitutional:       General: He is not in acute distress.     Appearance: He is well-developed. He is obese. He is not ill-appearing, toxic-appearing or diaphoretic.   HENT:      Head: Normocephalic and " atraumatic.   Eyes:      General: No scleral icterus.        Right eye: No discharge.         Left eye: No discharge.      Conjunctiva/sclera: Conjunctivae normal.   Neck:      Vascular: No carotid bruit.   Cardiovascular:      Rate and Rhythm: Normal rate and regular rhythm.      Heart sounds: Normal heart sounds. No murmur heard.  Pulmonary:      Effort: Pulmonary effort is normal. No respiratory distress.      Breath sounds: Normal breath sounds. No decreased breath sounds, wheezing, rhonchi or rales.   Abdominal:      General: There is no distension.      Palpations: Abdomen is soft.      Tenderness: There is no abdominal tenderness. There is no guarding or rebound.   Musculoskeletal:      Right lower leg: No edema.      Left lower leg: No edema.   Skin:     General: Skin is warm and dry.   Neurological:      Mental Status: He is alert.      Motor: No tremor.   Psychiatric:         Mood and Affect: Mood normal.         Speech: Speech normal.         Behavior: Behavior normal.             Assessment:       1. Essential hypertension    2. Pure hypercholesterolemia        Plan:       Vince was seen today for hypertension and hyperlipidemia.    Diagnoses and all orders for this visit:    Essential hypertension  -     labetaloL (NORMODYNE) 200 MG tablet; Take 1 tablet (200 mg total) by mouth 2 (two) times daily.  -     amLODIPine (NORVASC) 10 MG tablet; Take 1 tablet (10 mg total) by mouth once daily.  -     azilsartan med-chlorthalidone (EDARBYCLOR) 40-25 mg Tab; Take 1 tablet by mouth once daily.    Pure hypercholesterolemia  Comments:  Recheck lipids with next labs  Orders:  -     Lipid Panel; Future  -     atorvastatin (LIPITOR) 40 MG tablet; Take 1 tablet (40 mg total) by mouth once daily. Take one tablet daily.

## 2022-03-21 ENCOUNTER — TELEPHONE (OUTPATIENT)
Dept: FAMILY MEDICINE | Facility: CLINIC | Age: 57
End: 2022-03-21
Payer: COMMERCIAL

## 2022-03-21 NOTE — TELEPHONE ENCOUNTER
----- Message from Jose Hoang Jr., MD sent at 3/21/2022  9:31 AM CDT -----  Cholesterol looks good

## 2022-05-09 NOTE — PROGRESS NOTES
[facility] Hematology/Oncology  PROGRESS NOTE - Follow-up Visit       Subjective:       Patient ID:   NAME: Vince Kwon : 1965     56 y.o. male    Referring Doc: Ugo Johnson  Other Physicians: Natalya Gutierrez    Chief Complaint:  Colon ca f/u    History of Present Illness:     Patient is being seen today in person for a regularly scheduled visit; The patient is here by himself. He is doing ok with no new issues.  Bowel movements have been normal. He denies any CP, SOB, HA's or N/V.     He last saw Dr Hoang on  3/18/2022     He is seeing Dr Johnson again in 2022 for repeat scopes     He last had CT scans on 12/3/2021    Discussed covid19 precautions - he has not been vaccinated but had covid in /2022 and required an Ig infusion         .         ROS:   GEN: normal without any fever, night sweats or weight loss  HEENT: normal with no HA's, sore throat, stiff neck, changes in vision  CV: normal with no CP, SOB, PND, LIGHT or orthopnea  PULM: normal with no SOB, cough, hemoptysis, sputum or pleuritic pain  GI: normal with no abdominal pain, nausea, vomiting, constipation, diarrhea, melanotic stools, BRBPR, or hematemesis  : normal with no hematuria, dysuria  BREAST: normal with no mass, discharge, pain  SKIN: normal with no rash, erythema, bruising, or swelling    Allergies:  Review of patient's allergies indicates:  No Known Allergies    Medications:    Current Outpatient Medications:     amLODIPine (NORVASC) 10 MG tablet, Take 1 tablet (10 mg total) by mouth once daily., Disp: 90 tablet, Rfl: 1    ascorbic acid, vitamin C, (VITAMIN C) 1000 MG tablet, Take 1,000 mg by mouth once daily., Disp: , Rfl:     aspirin (ECOTRIN) 81 MG EC tablet, Take 81 mg by mouth., Disp: , Rfl:     atorvastatin (LIPITOR) 40 MG tablet, Take 1 tablet (40 mg total) by mouth once daily. Take one tablet daily., Disp: 90 tablet, Rfl: 1    azilsartan med-chlorthalidone (EDARBYCLOR) 40-25 mg Tab, Take 1 tablet by  mouth once daily., Disp: 90 tablet, Rfl: 1    labetaloL (NORMODYNE) 200 MG tablet, Take 1 tablet (200 mg total) by mouth 2 (two) times daily., Disp: 180 tablet, Rfl: 1    magnesium 30 mg Tab, Take 1 tablet by mouth once., Disp: , Rfl:     meclizine (ANTIVERT) 25 mg tablet, TAKE ONE TABLET BY MOUTH THREE TIMES DAILY AS NEEDED For dizziness, Disp: 30 tablet, Rfl: 1    potassium chloride SA (K-DUR,KLOR-CON) 20 MEQ tablet, Take 1 tablet (20 mEq total) by mouth 3 (three) times daily., Disp: 270 tablet, Rfl: 1    zinc 50 mg Tab, Take by mouth., Disp: , Rfl:     PMHx/PSHx Updates:  See patient's last visit with me on 10/28/2021  See H&P on 2/20/2018        Pathology:    3/7/2018:    FINAL DIAGNOSIS:  RIGHT COLON, HEMICOLECTOMY:    ULCERATED MODERATE TO MODERATELY WELL 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    THE TUMOR PENETRATES THE MUSCULARIS PROPRIA AND INVADES THE  PERICOLONIC FAT.    THE PROXIMAL (ILEAL) AND DISTAL (COLONIC) SURGICAL MARGINS OF  RESECTION ARE FREE OF    MALIGNANCY.    A TOTAL OF TWENTY-SEVEN (27) PERICOLONIC LYMPH NODES ARE FREE OF  METASTATIC TUMOR.    APPENDIX: REACTIVE MUCOSAL LYMPHOID HYPERPLASIA.     NO EVIDENCE OF METASTATIC CARCINOMA.    Procedure:  Right hemicolectomy  Specimen Length (if applicable):  <CR-*Specimen Length (if applicable):     12.0 cm>  Tumor Site:  Cecum  Tumor Location  Tumor Size:  Greatest dimension:  4.5 cm  Macroscopic Tumor Perforation:  Not identified  Macroscopic Intactness of Mesorectum:  Histologic Type:  Adenocarcinoma, focal mucinous (colloid) carcinoma  component.  Histologic Grade:  Low grade (well differentiated to moderately  differentiated)  Histologic Features Suggestive of Microsatellite Instability:  Intratumoral Lymphocytic Response (tumor-infiltrating lymphocytes):  Peritumor Lymphocytic Response (Crohn-like response):  Tumor Subtype and  "Differentiation:  Microscopic Tumor Extension:  Tumor invades through the muscularis  propria into the subserosal adipose tissue or the nonperitonealized  pericolic or perirectal soft tissues but does not extend to the serosal  surface  Margins    TNM Descriptors:  Primary Tumor (pT):  pT3:  Tumor invades through the muscularis propria  into pericolorectal tissues  Regional Lymph Nodes (pN):  pN0:  No regional lymph node metastasis  Distant Metastasis:  Not applicable.      Objective:     Vitals:  Blood pressure (!) 146/83, pulse 77, temperature 99 °F (37.2 °C), resp. rate 18, height 5' 8" (1.727 m), weight 108 kg (238 lb).        Physical Examination:   GEN: no apparent distress, comfortable; AAOx3  HEAD: atraumatic and normocephalic  EYES: no conjunctival pallor or muddiness, no icterus; normal pupil reaction to ambient light  ENT: OMM, no pharyngeal erythema, external bilateral ears WNL; no visible thrush or ulcers  NECK: no masses or swelling, trachea midline, no visible LAD/LN's   CV: no palpitations; no pedal edema; no noticeable JVD or neck vein distension  CHEST: Normal respiratory effort; chest wall breath movements symmetrical; no audible wheezing  ABDOM: non-distended; no bloating  MUSC/Skeletal: ROM normal; joints visibly normal; no deformities or arthropathy  EXTREM: no clubbing, cyanosis, inflammation or swelling  SKIN: no rashes, lesions, ulcers, petechiae or subcutaneous nodules  : no escamilla  NEURO: moving all 4 extremities; AAOx3; no tremors  PSYCH: normal mood, affect and behavior  LYMPH: no visible LN's or LAD              Labs:        Lab Results   Component Value Date    WBC 4.34 03/18/2022    HGB 14.4 03/18/2022    HCT 44.4 03/18/2022    MCV 82 03/18/2022     03/18/2022     BMP  Lab Results   Component Value Date     10/15/2021    K 3.0 (L) 10/15/2021     10/15/2021    CO2 30 (H) 10/15/2021    BUN 17 10/15/2021    CREATININE 1.2 10/15/2021    CALCIUM 9.3 10/15/2021    " ANIONGAP 9 10/15/2021    ESTGFRAFRICA >60.0 10/15/2021    EGFRNONAA >60.0 10/15/2021     Lab Results   Component Value Date    ALT 30 10/15/2021    AST 33 10/15/2021    ALKPHOS 59 10/15/2021    BILITOT 1.1 (H) 10/15/2021     Lab Results   Component Value Date    CEA 1.6 03/18/2022           Radiology/Diagnostic Studies:    CT 12/3/2021:  IMPRESSION:     No findings of metastatic disease or significant detrimental interval change.     Small noncalcified right-sided pulmonary nodules.     Additional observations as above.         CT scans  10/9/2020:    IMPRESSION:     1.  No acute intra-abdominal abnormalities identified.  2.  Small focal groundglass nodular-like density in the right  lateral ventricle lobes unchanged.  3.  Mild bilateral dependent subsegmental atelectasis.        CT chest scan  3/27/2020:    Impression       Stable soft tissue attenuation noncalcified pulmonary nodules with no new concerning pulmonary nodule or mass identified.  No findings to specifically suggest metastatic disease.           CT scans  9/20/2019:    Impression       No evidence of recurrence or metastatic disease    Stable tiny noncalcified nodules in the right lung           Ct scan 2/1/2019:    IMPRESSION:  1. Interval development of several prominent to borderline enlarged lymph nodes  within the central mesentery and right lower quadrant, nonspecific. These are  suspicious for metastatic disease, given lymphadenopathy seen on the patient's  original preoperative CT of 02/23/2018. Correlation with serum tumor markers,  and consideration for further evaluation with PET CT, are recommended.  2. No additional evidence for metastatic disease in the abdomen or the pelvis.          CT scans 7/27/2018:    IMPRESSION:    1. Stable appearance of subcentimeter right middle lobe and right upper lobe  pulmonary nodules.  2. Status post partial colonic resection with expected postoperative changes,  as discussed above. No residual mass or  pathologically enlarged lymph nodes in  the abdomen or pelvis.  3. Incidental findings as above.            PET/CT: 4/11/2018    IMPRESSION:    1. Persistent mild wall thickening centered about site of ileocolonic  anastomosis with associated FDG uptake is most likely postoperative in nature  and due to residual inflammation. Residual malignancy cannot be excluded on the  basis of imaging alone, and correlation with surgical resection margins is  requested.    2. No current convincing evidence of metastatic disease.    3. 3 mm nodular density which is vague in superior right middle lobe is nonspecific. CT thorax without IV contrast follow-up is recommended within 3-6  months to simply document stability.          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 Comparison: None Findings: Cardiac silhouette size is normal. Lungs are clear without effusion. No pneumothorax. No acute osseous abnormality. IMPRESSION: No acute pulmonary process. Read and electronically signed by: Shubham Marino MD on 2/28/2018 6:22 PM CST SHUBHAM MARINO MD    Ct Abdomen Pelvis With Contrast    Result Date: 2/23/2018  CMS MANDATED QUALITY DATA - CT RADIATION ? 436 All CT scans at this facility utilize dose modulation, iterative reconstruction, and/or weight based dosing when appropriate to reduce radiation dose to as low as reasonably achievable. CLINICAL HISTORY: 52 years (1965) Male K63.9 TECHNIQUE: MDI ABDOMEN AND PELVIS W  CONTRAST CT. 296 images obtained. Axial CT images of the abdomen and pelvis were obtained from the dome of the diaphragm to the proximal thigh. CONTRAST: 100 mL of IV Omni 350 was administered uneventfully by IV. Oral contrast was also administered COMPARISON: None available. FINDINGS: Lower Thorax: The lung bases are clear. The heart is normal in size and there is no pericardial effusion. CT  Abdomen: Liver: The liver is normal size and imaging appearance. Gallbladder: The gallbladder is unremarkable without acute cholecystitis. Biliary Tree: There is no intra or extrahepatic duct dilation. Spleen: The spleen is normal. Pancreas: The pancreas is normal. Adrenal Glands: The adrenal glands appear within normal limits. Kidneys: The kidneys are normal in imaging appearance without hydronephrosis or hydroureter. Vasculature: The aorta is normal in course and caliber. Lymph nodes: No abdominal lymphadenopathy is seen by size criteria. Intraperitoneal structures: There is no ascites. Bowel:  There is an apple core like lesion in the ascending colon extending over a length of approximately 5 cm (10 o'clock-8 o'clock position involving a majority of the circumference) the soft tissue mass measures approximately 2 cm in thickness. There are numerous small rounded lymph nodes in the cecal mesentery, none of which are enlarged by size criteria, however given the morphology and location these may be involved, for example a 15 x 7 mm node (image 121). Abdominal wall: The abdominal wall and musculature are normal. Musculoskeletal: There is a transitional lumbosacral vertebral body (S1) with a hypoplastic disc at S1-S2. There is moderate to severe disc height loss at L4-L5. No aggressive appearing lytic or blastic lesion is identified. CT Pelvis: Bladder: The urinary bladder is within normal limits. Reproductive Organs: The prostate and seminal vesicles are within normal limits. Pelvic Lymph nodes: No pelvic lymphadenopathy or pelvic mass is identified. IMPRESSION: 1. Apple-core like lesion in the descending colon most consistent with a primary colonic adenocarcinoma, there is no evidence of obstruction, pneumatosis intra-abdominal free air or abscess. 2. No lymphadenopathy by size criteria and no finding to specifically suggest metastatic disease in the abdomen or pelvis. Read and electronically signed by: Alexsander  "MD Vinicius on 2/23/2018 9:53 AM CST SINGH PINEDA MD      I have reviewed all available lab results and radiology reports.    Assessment/Plan:   (1) 56y.o. male with diagnosis of right colon mass found on recent colonoscopy on 2/16/2018 with Dr Johnson.  - he had been seen by Dr Gutierrez with [facility]   - s/p right colectomy with Dr Gutierrez on 3/7/2018  - 27 LN's were all negative  - T3 N0 and Stage IIA  - low grade adenocarcinoma  - PET scan on 4/11/2018 with no definitive evidence of ant metastatic process  - repeat CEA was 2.1  - will most likely not need chemotherapy if he remains a Stage II  - MMR was negative but all of all JESSICA was "stable" which has been shown to illicit a poorer prognosis in general but would not changes the current plan of therapy (discussed with the patient in detail)  - prior CT scans on 7/27/2018 which appeared to be stable; however, repeat CT on 2/1/2019 showed some increase in LN's which needed to be addressed with PET scans but his insurance declined   - latest CT's on 9/20/2019 with no evidence of recurrent cancer  - last colonoscopy was good per patient with another one planned for 3 yrs      10/12/2020:  - latest CT scans on 10/9/2020 appear to be stable  - CEA level good at 1.4  - no new bowel issues  - repeat scope in about 2 yrs per patient    4/29/2021:  - he had recent labs done with Dr Driver but did not have CEA done  - he was supposed to get repeat scans this month but has not done so    10/28/2021:  - insurance denied approval for his PET  - will look into getting CT scans instead  - CEA was 1.7  - he sees GI again in March 2022    5/10/2022:  - He last saw Dr Hoang on  3/18/2022   - he is seeing Dr Johnson again in June 2022 for repeat scopes  -  He last had CT scans on 12/3/2021  - CEA was at 1.6           (2) HTN and hypercholesterolemia     (3) Prior mini-CVA - HTN related; no subsequent deficits; sounds like he had a TIA    (4) RML lung nodule on PEt at " 3mm with no FDG uptake; CT stable;  - he last saw Dr Crockett with pulmonary on 4/2/2019  - latest Ct was stable on 9/20/2019 and again on 3/27/2020    1. Primary adenocarcinoma of ascending colon           PLAN:  1. Repeat scans again in Dec 2022; proceed with repeat scope with Dr Johnson in June 2022  2. F/u with GI, PCP, Gen [facility], pulm etc  3. F/u with GI in march 2022  4. Encouraged compliance with f/u with Pulm  5. Check up to date labs incl CEA every 6 months  6. RTC Dec 2022 after scans    Fax note to Alex, Jose Hoang Jr, MD, [facility]    Discussion:       Total Time spent on patient:    I spent over 25 mins of time with the patient. Reviewed results of the recently ordered labs, tests, reports and studies; made directives with regards to the results. Over half of this time was spent couseling and coordinating care, making treatment and analytical decisions; ordering necessary labs, tests and studies; and discussing treatment options and setting up treatment plan(s) if indicated.    COVID-19 Discussion:    I had long discussion with patient and any applicable family about the COVID-19 coronavirus epidemic and the recommended precautions with regard to cancer and/or hematology patients. I have re-iterated the CDC recommendations for adequate hand washing, use of hand -like products, and coughing into elbow, etc. In addition, especially for our patients who are on chemotherapy and/or our otherwise immunocompromised patients, I have recommended avoidance of crowds, including movie theaters, restaurants, churches, etc. I have recommended avoidance of any sick or symptomatic family members and/or friends. I have also recommended avoidance of any raw and unwashed food products, and general avoidance of food items that have not been prepared by themselves. The patient has been asked to call us immediately with any symptom developments, issues, questions or other general  concerns.            I have explained all of the above in detail and the patient understands all of the current recommendation(s). I have answered all of their questions to the best of my ability and to their complete satisfaction.   The patient is to continue with the current management plan.            Electronically signed by Carlo Welch MD

## 2022-05-10 ENCOUNTER — OFFICE VISIT (OUTPATIENT)
Dept: HEMATOLOGY/ONCOLOGY | Facility: CLINIC | Age: 57
End: 2022-05-10
Payer: COMMERCIAL

## 2022-05-10 VITALS
RESPIRATION RATE: 18 BRPM | WEIGHT: 238 LBS | TEMPERATURE: 99 F | HEART RATE: 77 BPM | DIASTOLIC BLOOD PRESSURE: 83 MMHG | SYSTOLIC BLOOD PRESSURE: 146 MMHG | HEIGHT: 68 IN | BODY MASS INDEX: 36.07 KG/M2

## 2022-05-10 DIAGNOSIS — C18.2 PRIMARY ADENOCARCINOMA OF ASCENDING COLON: Primary | ICD-10-CM

## 2022-05-10 PROCEDURE — 99214 OFFICE O/P EST MOD 30 MIN: CPT | Mod: S$GLB,,, | Performed by: INTERNAL MEDICINE

## 2022-05-10 PROCEDURE — 3079F DIAST BP 80-89 MM HG: CPT | Mod: CPTII,S$GLB,, | Performed by: INTERNAL MEDICINE

## 2022-05-10 PROCEDURE — 1160F RVW MEDS BY RX/DR IN RCRD: CPT | Mod: CPTII,S$GLB,, | Performed by: INTERNAL MEDICINE

## 2022-05-10 PROCEDURE — 3077F PR MOST RECENT SYSTOLIC BLOOD PRESSURE >= 140 MM HG: ICD-10-PCS | Mod: CPTII,S$GLB,, | Performed by: INTERNAL MEDICINE

## 2022-05-10 PROCEDURE — 99214 PR OFFICE/OUTPT VISIT, EST, LEVL IV, 30-39 MIN: ICD-10-PCS | Mod: S$GLB,,, | Performed by: INTERNAL MEDICINE

## 2022-05-10 PROCEDURE — 3008F PR BODY MASS INDEX (BMI) DOCUMENTED: ICD-10-PCS | Mod: CPTII,S$GLB,, | Performed by: INTERNAL MEDICINE

## 2022-05-10 PROCEDURE — 1159F PR MEDICATION LIST DOCUMENTED IN MEDICAL RECORD: ICD-10-PCS | Mod: CPTII,S$GLB,, | Performed by: INTERNAL MEDICINE

## 2022-05-10 PROCEDURE — 3077F SYST BP >= 140 MM HG: CPT | Mod: CPTII,S$GLB,, | Performed by: INTERNAL MEDICINE

## 2022-05-10 PROCEDURE — 3079F PR MOST RECENT DIASTOLIC BLOOD PRESSURE 80-89 MM HG: ICD-10-PCS | Mod: CPTII,S$GLB,, | Performed by: INTERNAL MEDICINE

## 2022-05-10 PROCEDURE — 1159F MED LIST DOCD IN RCRD: CPT | Mod: CPTII,S$GLB,, | Performed by: INTERNAL MEDICINE

## 2022-05-10 PROCEDURE — 3008F BODY MASS INDEX DOCD: CPT | Mod: CPTII,S$GLB,, | Performed by: INTERNAL MEDICINE

## 2022-05-10 PROCEDURE — 1160F PR REVIEW ALL MEDS BY PRESCRIBER/CLIN PHARMACIST DOCUMENTED: ICD-10-PCS | Mod: CPTII,S$GLB,, | Performed by: INTERNAL MEDICINE

## 2022-08-19 ENCOUNTER — OFFICE VISIT (OUTPATIENT)
Dept: FAMILY MEDICINE | Facility: CLINIC | Age: 57
End: 2022-08-19
Payer: COMMERCIAL

## 2022-08-19 VITALS
HEART RATE: 76 BPM | WEIGHT: 236 LBS | DIASTOLIC BLOOD PRESSURE: 80 MMHG | OXYGEN SATURATION: 95 % | SYSTOLIC BLOOD PRESSURE: 120 MMHG | HEIGHT: 68 IN | BODY MASS INDEX: 35.77 KG/M2 | TEMPERATURE: 98 F

## 2022-08-19 DIAGNOSIS — R42 VERTIGO: ICD-10-CM

## 2022-08-19 DIAGNOSIS — E87.6 HYPOKALEMIA: ICD-10-CM

## 2022-08-19 DIAGNOSIS — I10 ESSENTIAL HYPERTENSION: Primary | ICD-10-CM

## 2022-08-19 DIAGNOSIS — E78.00 PURE HYPERCHOLESTEROLEMIA: ICD-10-CM

## 2022-08-19 PROCEDURE — 99213 OFFICE O/P EST LOW 20 MIN: CPT | Mod: S$GLB,,, | Performed by: INTERNAL MEDICINE

## 2022-08-19 PROCEDURE — 3008F BODY MASS INDEX DOCD: CPT | Mod: CPTII,S$GLB,, | Performed by: INTERNAL MEDICINE

## 2022-08-19 PROCEDURE — 1159F PR MEDICATION LIST DOCUMENTED IN MEDICAL RECORD: ICD-10-PCS | Mod: CPTII,S$GLB,, | Performed by: INTERNAL MEDICINE

## 2022-08-19 PROCEDURE — 3079F PR MOST RECENT DIASTOLIC BLOOD PRESSURE 80-89 MM HG: ICD-10-PCS | Mod: CPTII,S$GLB,, | Performed by: INTERNAL MEDICINE

## 2022-08-19 PROCEDURE — 3079F DIAST BP 80-89 MM HG: CPT | Mod: CPTII,S$GLB,, | Performed by: INTERNAL MEDICINE

## 2022-08-19 PROCEDURE — 3008F PR BODY MASS INDEX (BMI) DOCUMENTED: ICD-10-PCS | Mod: CPTII,S$GLB,, | Performed by: INTERNAL MEDICINE

## 2022-08-19 PROCEDURE — 3074F SYST BP LT 130 MM HG: CPT | Mod: CPTII,S$GLB,, | Performed by: INTERNAL MEDICINE

## 2022-08-19 PROCEDURE — 99213 PR OFFICE/OUTPT VISIT, EST, LEVL III, 20-29 MIN: ICD-10-PCS | Mod: S$GLB,,, | Performed by: INTERNAL MEDICINE

## 2022-08-19 PROCEDURE — 3074F PR MOST RECENT SYSTOLIC BLOOD PRESSURE < 130 MM HG: ICD-10-PCS | Mod: CPTII,S$GLB,, | Performed by: INTERNAL MEDICINE

## 2022-08-19 PROCEDURE — 1159F MED LIST DOCD IN RCRD: CPT | Mod: CPTII,S$GLB,, | Performed by: INTERNAL MEDICINE

## 2022-08-19 RX ORDER — MECLIZINE HYDROCHLORIDE 25 MG/1
25 TABLET ORAL 3 TIMES DAILY PRN
Qty: 30 TABLET | Refills: 1 | Status: SHIPPED | OUTPATIENT
Start: 2022-08-19 | End: 2023-01-20 | Stop reason: SDUPTHER

## 2022-08-19 RX ORDER — POTASSIUM CHLORIDE 20 MEQ/1
20 TABLET, EXTENDED RELEASE ORAL 3 TIMES DAILY
Qty: 270 TABLET | Refills: 1 | Status: SHIPPED | OUTPATIENT
Start: 2022-08-19 | End: 2023-01-20 | Stop reason: SDUPTHER

## 2022-08-19 RX ORDER — AZILSARTAN KAMEDOXOMIL AND CHLORTHALIDONE 40; 25 MG/1; MG/1
1 TABLET ORAL DAILY
Qty: 90 TABLET | Refills: 1 | Status: SHIPPED | OUTPATIENT
Start: 2022-08-19 | End: 2023-01-20 | Stop reason: SDUPTHER

## 2022-08-19 RX ORDER — LABETALOL 200 MG/1
200 TABLET, FILM COATED ORAL 2 TIMES DAILY
Qty: 180 TABLET | Refills: 1 | Status: SHIPPED | OUTPATIENT
Start: 2022-08-19 | End: 2023-01-20 | Stop reason: SDUPTHER

## 2022-08-19 RX ORDER — ATORVASTATIN CALCIUM 40 MG/1
40 TABLET, FILM COATED ORAL DAILY
Qty: 90 TABLET | Refills: 1 | Status: SHIPPED | OUTPATIENT
Start: 2022-08-19 | End: 2023-01-20 | Stop reason: SDUPTHER

## 2022-08-19 RX ORDER — AMLODIPINE BESYLATE 10 MG/1
10 TABLET ORAL DAILY
Qty: 90 TABLET | Refills: 1 | Status: SHIPPED | OUTPATIENT
Start: 2022-08-19 | End: 2023-01-20 | Stop reason: SDUPTHER

## 2022-08-19 NOTE — PROGRESS NOTES
Subjective:       Patient ID: Vince Kwon is a 56 y.o. male.    Chief Complaint: Hypertension (5 months follow up)    Here for routine follow up.  Followed by Heme/Onc and GI for h/o colon cancer.  His colonoscopy is due; planning to go by there today to schedule.    Hypertension  This is a chronic problem. The problem is controlled (120s/80s at home). Pertinent negatives include no anxiety, chest pain, headaches, malaise/fatigue, neck pain, palpitations, peripheral edema or shortness of breath. Risk factors for coronary artery disease include male gender, obesity and dyslipidemia. Past treatments include beta blockers, calcium channel blockers, diuretics and angiotensin blockers. The current treatment provides significant improvement.   Hyperlipidemia  This is a chronic problem. The problem is controlled. Recent lipid tests were reviewed and are normal. Exacerbating diseases include obesity. Pertinent negatives include no chest pain, myalgias or shortness of breath. Current antihyperlipidemic treatment includes statins. The current treatment provides mild improvement of lipids. There are no compliance problems.      Review of Systems   Constitutional: Negative for activity change, appetite change, chills, diaphoresis, fatigue, fever, malaise/fatigue and unexpected weight change.   HENT: Negative for congestion, ear discharge, ear pain, hearing loss, nosebleeds, postnasal drip, rhinorrhea, sinus pressure, sinus pain, sneezing, sore throat, tinnitus, trouble swallowing and voice change.    Eyes: Negative for photophobia, pain, discharge, redness, itching and visual disturbance.   Respiratory: Negative for apnea, cough, choking, chest tightness, shortness of breath and wheezing.    Cardiovascular: Negative for chest pain, palpitations and leg swelling.   Gastrointestinal: Negative for abdominal distention, abdominal pain, blood in stool, constipation, diarrhea, nausea and vomiting.   Endocrine: Negative for  cold intolerance, heat intolerance, polydipsia and polyuria.   Genitourinary: Negative for decreased urine volume, difficulty urinating, dysuria, enuresis, flank pain, frequency, genital sores, hematuria, penile discharge, penile pain, scrotal swelling, testicular pain and urgency.   Musculoskeletal: Negative for arthralgias, back pain, gait problem, joint swelling, myalgias, neck pain and neck stiffness.   Skin: Negative for rash and wound.   Allergic/Immunologic: Negative for environmental allergies, food allergies and immunocompromised state.   Neurological: Negative for dizziness, tremors, seizures, syncope, facial asymmetry, speech difficulty, weakness, light-headedness, numbness and headaches.   Hematological: Negative for adenopathy. Does not bruise/bleed easily.   Psychiatric/Behavioral: Negative for confusion, decreased concentration, hallucinations, self-injury, sleep disturbance and suicidal ideas. The patient is not nervous/anxious.        Past Medical History:   Diagnosis Date    Colon tumor     Hyperlipidemia     Hypertension     Hypokalemia 3/1/2018    Multiple pulmonary nodules 8/10/2018      Past Surgical History:   Procedure Laterality Date    COLON SURGERY  03/07/2018    COLONOSCOPY      FINGER SURGERY      RIGHT COLECTOMY Right 03/07/2018           Family History   Problem Relation Age of Onset    Diabetes Mother     Heart disease Mother     Diabetes Sister     Breast cancer Sister     Diabetes Maternal Aunt     Diabetes Maternal Grandmother     Heart disease Maternal Grandmother     Heart disease Maternal Grandfather        Social History     Socioeconomic History    Marital status: Single   Occupational History    Occupation: shipping and [employer]   Tobacco Use    Smoking status: Former Smoker    Smokeless tobacco: Never Used   Substance and Sexual Activity    Alcohol use: Yes     Comment: occ    Drug use: No    Sexual activity: Yes     Partners: Female  "  Social History Narrative    Lives alone       Current Outpatient Medications   Medication Sig Dispense Refill    ascorbic acid, vitamin C, (VITAMIN C) 1000 MG tablet Take 1,000 mg by mouth once daily.      aspirin (ECOTRIN) 81 MG EC tablet Take 81 mg by mouth.      magnesium 30 mg Tab Take 1 tablet by mouth once.      zinc 50 mg Tab Take by mouth.      amLODIPine (NORVASC) 10 MG tablet Take 1 tablet (10 mg total) by mouth once daily. 90 tablet 1    atorvastatin (LIPITOR) 40 MG tablet Take 1 tablet (40 mg total) by mouth once daily. Take one tablet daily. 90 tablet 1    azilsartan med-chlorthalidone (EDARBYCLOR) 40-25 mg Tab Take 1 tablet by mouth once daily. 90 tablet 1    labetaloL (NORMODYNE) 200 MG tablet Take 1 tablet (200 mg total) by mouth 2 (two) times daily. 180 tablet 1    meclizine (ANTIVERT) 25 mg tablet Take 1 tablet (25 mg total) by mouth 3 (three) times daily as needed. 30 tablet 1    potassium chloride SA (K-DUR,KLOR-CON) 20 MEQ tablet Take 1 tablet (20 mEq total) by mouth 3 (three) times daily. 270 tablet 1     No current facility-administered medications for this visit.       Review of patient's allergies indicates:  No Known Allergies  Objective:    HPI     Hypertension      Additional comments: 5 months follow up          Last edited by Jose R Willis MA on 8/19/2022  9:18 AM. (History)      Blood pressure 120/80, pulse 76, temperature 98.1 °F (36.7 °C), temperature source Temporal, height 5' 8" (1.727 m), weight 107 kg (236 lb), SpO2 95 %. Body mass index is 35.88 kg/m².   Physical Exam  Vitals and nursing note reviewed.   Constitutional:       General: He is not in acute distress.     Appearance: He is well-developed. He is obese. He is not ill-appearing, toxic-appearing or diaphoretic.   HENT:      Head: Normocephalic and atraumatic.   Eyes:      General: No scleral icterus.        Right eye: No discharge.         Left eye: No discharge.      Conjunctiva/sclera: Conjunctivae " normal.   Neck:      Vascular: No carotid bruit.   Cardiovascular:      Rate and Rhythm: Normal rate and regular rhythm.      Heart sounds: Normal heart sounds. No murmur heard.  Pulmonary:      Effort: Pulmonary effort is normal. No respiratory distress.      Breath sounds: Normal breath sounds. No decreased breath sounds, wheezing, rhonchi or rales.   Abdominal:      General: There is no distension.      Palpations: Abdomen is soft.      Tenderness: There is no abdominal tenderness. There is no guarding or rebound.   Musculoskeletal:      Right lower leg: No edema.      Left lower leg: No edema.   Skin:     General: Skin is warm and dry.   Neurological:      Mental Status: He is alert.      Motor: No tremor.   Psychiatric:         Mood and Affect: Mood normal.         Speech: Speech normal.         Behavior: Behavior normal.             Assessment:       1. Essential hypertension    2. Vertigo    3. Hypokalemia    4. Pure hypercholesterolemia        Plan:       Vince was seen today for hypertension.    Diagnoses and all orders for this visit:    Essential hypertension  -     amLODIPine (NORVASC) 10 MG tablet; Take 1 tablet (10 mg total) by mouth once daily.  -     labetaloL (NORMODYNE) 200 MG tablet; Take 1 tablet (200 mg total) by mouth 2 (two) times daily.  -     azilsartan med-chlorthalidone (EDARBYCLOR) 40-25 mg Tab; Take 1 tablet by mouth once daily.    Vertigo  Comments:  Will give him some mecilizine as needed.  Usually self limited.  ENT eval if persistent.  Orders:  -     meclizine (ANTIVERT) 25 mg tablet; Take 1 tablet (25 mg total) by mouth 3 (three) times daily as needed.    Hypokalemia  -     potassium chloride SA (K-DUR,KLOR-CON) 20 MEQ tablet; Take 1 tablet (20 mEq total) by mouth 3 (three) times daily.    Pure hypercholesterolemia  Comments:  Recheck lipids with next labs  Orders:  -     atorvastatin (LIPITOR) 40 MG tablet; Take 1 tablet (40 mg total) by mouth once daily. Take one tablet  daily.

## 2022-09-02 DIAGNOSIS — Z85.038 HISTORY OF COLON CANCER: Primary | ICD-10-CM

## 2022-11-18 ENCOUNTER — TELEPHONE (OUTPATIENT)
Dept: HEMATOLOGY/ONCOLOGY | Facility: CLINIC | Age: 57
End: 2022-11-18

## 2022-11-18 DIAGNOSIS — C18.2 PRIMARY ADENOCARCINOMA OF ASCENDING COLON: Primary | ICD-10-CM

## 2022-11-23 ENCOUNTER — TELEPHONE (OUTPATIENT)
Dept: HEMATOLOGY/ONCOLOGY | Facility: CLINIC | Age: 57
End: 2022-11-23

## 2022-11-23 ENCOUNTER — LAB VISIT (OUTPATIENT)
Dept: LAB | Facility: HOSPITAL | Age: 57
End: 2022-11-23
Attending: INTERNAL MEDICINE
Payer: COMMERCIAL

## 2022-11-23 DIAGNOSIS — C18.2 PRIMARY ADENOCARCINOMA OF ASCENDING COLON: ICD-10-CM

## 2022-11-23 LAB
ALBUMIN SERPL BCP-MCNC: 4.1 G/DL (ref 3.5–5.2)
ALP SERPL-CCNC: 65 U/L (ref 55–135)
ALT SERPL W/O P-5'-P-CCNC: 30 U/L (ref 10–44)
ANION GAP SERPL CALC-SCNC: 11 MMOL/L (ref 8–16)
AST SERPL-CCNC: 27 U/L (ref 10–40)
BASOPHILS # BLD AUTO: 0.02 K/UL (ref 0–0.2)
BASOPHILS NFR BLD: 0.4 % (ref 0–1.9)
BILIRUB SERPL-MCNC: 1.1 MG/DL (ref 0.1–1)
BUN SERPL-MCNC: 20 MG/DL (ref 6–20)
CALCIUM SERPL-MCNC: 9.4 MG/DL (ref 8.7–10.5)
CEA SERPL-MCNC: 2.5 NG/ML (ref 0–5)
CHLORIDE SERPL-SCNC: 98 MMOL/L (ref 95–110)
CO2 SERPL-SCNC: 30 MMOL/L (ref 23–29)
CREAT SERPL-MCNC: 1.1 MG/DL (ref 0.5–1.4)
DIFFERENTIAL METHOD: ABNORMAL
EOSINOPHIL # BLD AUTO: 0.1 K/UL (ref 0–0.5)
EOSINOPHIL NFR BLD: 1.3 % (ref 0–8)
ERYTHROCYTE [DISTWIDTH] IN BLOOD BY AUTOMATED COUNT: 13.4 % (ref 11.5–14.5)
EST. GFR  (NO RACE VARIABLE): >60 ML/MIN/1.73 M^2
GLUCOSE SERPL-MCNC: 96 MG/DL (ref 70–110)
HCT VFR BLD AUTO: 49.5 % (ref 40–54)
HGB BLD-MCNC: 15.8 G/DL (ref 14–18)
IMM GRANULOCYTES # BLD AUTO: 0.01 K/UL (ref 0–0.04)
IMM GRANULOCYTES NFR BLD AUTO: 0.2 % (ref 0–0.5)
LYMPHOCYTES # BLD AUTO: 2.2 K/UL (ref 1–4.8)
LYMPHOCYTES NFR BLD: 40.1 % (ref 18–48)
MCH RBC QN AUTO: 27.5 PG (ref 27–31)
MCHC RBC AUTO-ENTMCNC: 31.9 G/DL (ref 32–36)
MCV RBC AUTO: 86 FL (ref 82–98)
MONOCYTES # BLD AUTO: 0.5 K/UL (ref 0.3–1)
MONOCYTES NFR BLD: 8.7 % (ref 4–15)
NEUTROPHILS # BLD AUTO: 2.7 K/UL (ref 1.8–7.7)
NEUTROPHILS NFR BLD: 49.3 % (ref 38–73)
NRBC BLD-RTO: 0 /100 WBC
PLATELET # BLD AUTO: 148 K/UL (ref 150–450)
PMV BLD AUTO: 11 FL (ref 9.2–12.9)
POTASSIUM SERPL-SCNC: 3 MMOL/L (ref 3.5–5.1)
PROT SERPL-MCNC: 7.5 G/DL (ref 6–8.4)
RBC # BLD AUTO: 5.75 M/UL (ref 4.6–6.2)
SODIUM SERPL-SCNC: 139 MMOL/L (ref 136–145)
WBC # BLD AUTO: 5.53 K/UL (ref 3.9–12.7)

## 2022-11-23 PROCEDURE — 36415 COLL VENOUS BLD VENIPUNCTURE: CPT | Performed by: INTERNAL MEDICINE

## 2022-11-23 PROCEDURE — 85025 COMPLETE CBC W/AUTO DIFF WBC: CPT | Performed by: INTERNAL MEDICINE

## 2022-11-23 PROCEDURE — 80053 COMPREHEN METABOLIC PANEL: CPT | Performed by: INTERNAL MEDICINE

## 2022-11-23 PROCEDURE — 82378 CARCINOEMBRYONIC ANTIGEN: CPT | Performed by: INTERNAL MEDICINE

## 2022-11-23 NOTE — TELEPHONE ENCOUNTER
Attempted to call patient to inform of low K and inquire as to if he is taking his prescribed K supplement, no answer, no VM available.

## 2022-11-30 ENCOUNTER — TELEPHONE (OUTPATIENT)
Dept: HEMATOLOGY/ONCOLOGY | Facility: CLINIC | Age: 57
End: 2022-11-30

## 2022-11-30 DIAGNOSIS — E87.6 HYPOKALEMIA: Primary | ICD-10-CM

## 2022-11-30 NOTE — TELEPHONE ENCOUNTER
Again attempted to call patient to verify whether or not he was taking K supplement due to his low K level on last lab draw, again no answer, no VM available.

## 2022-11-30 NOTE — TELEPHONE ENCOUNTER
----- Message from Jada Garsia NP sent at 11/23/2022  2:02 PM CST -----  Lets make sure he is taking potassium     ----- Message -----  From: Carlo Welch MD  Sent: 11/23/2022   1:53 PM CST  To: Jose Hoang Jr., MD, #    Potassium still low

## 2022-11-30 NOTE — TELEPHONE ENCOUNTER
Patient made aware of K 3.0, states he is taking his prescribed K supplement but that he is also taking BP meds that affect his K level. Jada made aware, per her verbal orders I instructed patient top double his K supplement dose from 1 tab BID to 2 tabs BID and recheck labs in 1 week, verbalized understanding, lab orders placed. Labs routed to Dr. Tran for review per Jada.

## 2022-12-07 ENCOUNTER — LAB VISIT (OUTPATIENT)
Dept: LAB | Facility: HOSPITAL | Age: 57
End: 2022-12-07
Attending: INTERNAL MEDICINE
Payer: COMMERCIAL

## 2022-12-07 DIAGNOSIS — C18.2 PRIMARY ADENOCARCINOMA OF ASCENDING COLON: ICD-10-CM

## 2022-12-07 LAB
ALBUMIN SERPL BCP-MCNC: 4.4 G/DL (ref 3.5–5.2)
ALP SERPL-CCNC: 60 U/L (ref 55–135)
ALT SERPL W/O P-5'-P-CCNC: 22 U/L (ref 10–44)
ANION GAP SERPL CALC-SCNC: 8 MMOL/L (ref 8–16)
AST SERPL-CCNC: 29 U/L (ref 10–40)
BASOPHILS # BLD AUTO: 0.02 K/UL (ref 0–0.2)
BASOPHILS NFR BLD: 0.4 % (ref 0–1.9)
BILIRUB SERPL-MCNC: 1.3 MG/DL (ref 0.1–1)
BUN SERPL-MCNC: 19 MG/DL (ref 6–20)
CALCIUM SERPL-MCNC: 9.4 MG/DL (ref 8.7–10.5)
CEA SERPL-MCNC: 2 NG/ML (ref 0–5)
CHLORIDE SERPL-SCNC: 100 MMOL/L (ref 95–110)
CO2 SERPL-SCNC: 30 MMOL/L (ref 23–29)
CREAT SERPL-MCNC: 1.3 MG/DL (ref 0.5–1.4)
DIFFERENTIAL METHOD: ABNORMAL
EOSINOPHIL # BLD AUTO: 0.1 K/UL (ref 0–0.5)
EOSINOPHIL NFR BLD: 1.2 % (ref 0–8)
ERYTHROCYTE [DISTWIDTH] IN BLOOD BY AUTOMATED COUNT: 13.3 % (ref 11.5–14.5)
EST. GFR  (NO RACE VARIABLE): >60 ML/MIN/1.73 M^2
GLUCOSE SERPL-MCNC: 98 MG/DL (ref 70–110)
HCT VFR BLD AUTO: 45.2 % (ref 40–54)
HGB BLD-MCNC: 14.7 G/DL (ref 14–18)
IMM GRANULOCYTES # BLD AUTO: 0.01 K/UL (ref 0–0.04)
IMM GRANULOCYTES NFR BLD AUTO: 0.2 % (ref 0–0.5)
LYMPHOCYTES # BLD AUTO: 2 K/UL (ref 1–4.8)
LYMPHOCYTES NFR BLD: 40.7 % (ref 18–48)
MCH RBC QN AUTO: 27.6 PG (ref 27–31)
MCHC RBC AUTO-ENTMCNC: 32.5 G/DL (ref 32–36)
MCV RBC AUTO: 85 FL (ref 82–98)
MONOCYTES # BLD AUTO: 0.5 K/UL (ref 0.3–1)
MONOCYTES NFR BLD: 10.3 % (ref 4–15)
NEUTROPHILS # BLD AUTO: 2.3 K/UL (ref 1.8–7.7)
NEUTROPHILS NFR BLD: 47.2 % (ref 38–73)
NRBC BLD-RTO: 0 /100 WBC
PLATELET # BLD AUTO: 237 K/UL (ref 150–450)
PMV BLD AUTO: 9 FL (ref 9.2–12.9)
POTASSIUM SERPL-SCNC: 3.3 MMOL/L (ref 3.5–5.1)
PROT SERPL-MCNC: 7.7 G/DL (ref 6–8.4)
RBC # BLD AUTO: 5.33 M/UL (ref 4.6–6.2)
SODIUM SERPL-SCNC: 138 MMOL/L (ref 136–145)
WBC # BLD AUTO: 4.84 K/UL (ref 3.9–12.7)

## 2022-12-07 PROCEDURE — 36415 COLL VENOUS BLD VENIPUNCTURE: CPT | Performed by: INTERNAL MEDICINE

## 2022-12-07 PROCEDURE — 85025 COMPLETE CBC W/AUTO DIFF WBC: CPT | Performed by: INTERNAL MEDICINE

## 2022-12-07 PROCEDURE — 82378 CARCINOEMBRYONIC ANTIGEN: CPT | Performed by: INTERNAL MEDICINE

## 2022-12-07 PROCEDURE — 80053 COMPREHEN METABOLIC PANEL: CPT | Performed by: INTERNAL MEDICINE

## 2022-12-09 ENCOUNTER — HOSPITAL ENCOUNTER (OUTPATIENT)
Dept: RADIOLOGY | Facility: HOSPITAL | Age: 57
Discharge: HOME | End: 2022-12-09
Attending: NURSE PRACTITIONER
Payer: COMMERCIAL

## 2022-12-09 DIAGNOSIS — C18.2 PRIMARY ADENOCARCINOMA OF ASCENDING COLON: ICD-10-CM

## 2022-12-09 PROCEDURE — 71260 CT THORAX DX C+: CPT | Mod: TC,PO

## 2022-12-09 PROCEDURE — 74177 CT ABD & PELVIS W/CONTRAST: CPT | Mod: TC,PO

## 2022-12-09 PROCEDURE — 25500020 PHARM REV CODE 255: Mod: PO | Performed by: NURSE PRACTITIONER

## 2022-12-09 RX ADMIN — IOHEXOL 100 ML: 350 INJECTION, SOLUTION INTRAVENOUS at 08:12

## 2022-12-12 NOTE — PROGRESS NOTES
[facility] Hematology/Oncology  PROGRESS NOTE - Follow-up Visit       Subjective:       Patient ID:   NAME: Vince Kwon : 1965     57 y.o. male    Referring Doc: Ugo Johnson  Other Physicians: Natalya Gutierrez    Chief Complaint:  Colon ca f/u    History of Present Illness:     Patient is being seen today in person for a regularly scheduled visit; The patient is here by himself.     He is doing ok with no new issues.  Bowel movements have been normal. He denies any CP, SOB, HA's or N/V.     He last saw Dr Hoang on  5/10/2022     He has been seeing Dr Johnson and was suppose to have repeat scopes in 2022 but did not have it dome due to insurnace     He had recent scans on 2022    Discussed covid19 precautions - he has not been vaccinated but had covid in 2022 and required an Ig infusion         .         ROS:   GEN: normal without any fever, night sweats or weight loss  HEENT: normal with no HA's, sore throat, stiff neck, changes in vision  CV: normal with no CP, SOB, PND, LIGHT or orthopnea  PULM: normal with no SOB, cough, hemoptysis, sputum or pleuritic pain  GI: normal with no abdominal pain, nausea, vomiting, constipation, diarrhea, melanotic stools, BRBPR, or hematemesis  : normal with no hematuria, dysuria  BREAST: normal with no mass, discharge, pain  SKIN: normal with no rash, erythema, bruising, or swelling    Allergies:  Review of patient's allergies indicates:  No Known Allergies    Medications:    Current Outpatient Medications:     amLODIPine (NORVASC) 10 MG tablet, Take 1 tablet (10 mg total) by mouth once daily., Disp: 90 tablet, Rfl: 1    ascorbic acid, vitamin C, (VITAMIN C) 1000 MG tablet, Take 1,000 mg by mouth once daily., Disp: , Rfl:     aspirin (ECOTRIN) 81 MG EC tablet, Take 81 mg by mouth., Disp: , Rfl:     atorvastatin (LIPITOR) 40 MG tablet, Take 1 tablet (40 mg total) by mouth once daily. Take one tablet daily., Disp: 90 tablet, Rfl: 1    azilsartan  med-chlorthalidone (EDARBYCLOR) 40-25 mg Tab, Take 1 tablet by mouth once daily., Disp: 90 tablet, Rfl: 1    labetaloL (NORMODYNE) 200 MG tablet, Take 1 tablet (200 mg total) by mouth 2 (two) times daily., Disp: 180 tablet, Rfl: 1    magnesium 30 mg Tab, Take 1 tablet by mouth once., Disp: , Rfl:     meclizine (ANTIVERT) 25 mg tablet, Take 1 tablet (25 mg total) by mouth 3 (three) times daily as needed., Disp: 30 tablet, Rfl: 1    potassium chloride SA (K-DUR,KLOR-CON) 20 MEQ tablet, Take 1 tablet (20 mEq total) by mouth 3 (three) times daily. 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    THE TUMOR PENETRATES THE MUSCULARIS PROPRIA AND INVADES THE  PERICOLONIC FAT.    THE PROXIMAL (ILEAL) AND DISTAL (COLONIC) SURGICAL MARGINS OF  RESECTION ARE FREE OF    MALIGNANCY.    A TOTAL OF TWENTY-SEVEN (27) PERICOLONIC LYMPH NODES ARE FREE OF  METASTATIC TUMOR.    APPENDIX: REACTIVE MUCOSAL LYMPHOID HYPERPLASIA.     NO EVIDENCE OF METASTATIC CARCINOMA.    Procedure:  Right hemicolectomy  Specimen Length (if applicable):  <CR-*Specimen Length (if applicable):     12.0 cm>  Tumor Site:  Cecum  Tumor Location  Tumor Size:  Greatest dimension:  4.5 cm  Macroscopic Tumor Perforation:  Not identified  Macroscopic Intactness of Mesorectum:  Histologic Type:  Adenocarcinoma, focal mucinous (colloid) carcinoma  component.  Histologic Grade:  Low grade (well differentiated to moderately  differentiated)  Histologic Features Suggestive of Microsatellite Instability:  Intratumoral  Lymphocytic Response (tumor-infiltrating lymphocytes):  Peritumor Lymphocytic Response (Crohn-like response):  Tumor Subtype and Differentiation:  Microscopic Tumor Extension:  Tumor invades through the muscularis  propria into the subserosal adipose tissue or the nonperitonealized  pericolic or perirectal soft tissues but does not extend to the serosal  surface  Margins    TNM Descriptors:  Primary Tumor (pT):  pT3:  Tumor invades through the muscularis propria  into pericolorectal tissues  Regional Lymph Nodes (pN):  pN0:  No regional lymph node metastasis  Distant Metastasis:  Not applicable.      Objective:     Vitals:  Blood pressure (!) 147/91, pulse 101, temperature 98.2 °F (36.8 °C), weight 111.6 kg (246 lb).        Physical Examination:   GEN: no apparent distress, comfortable; AAOx3; overweight  HEAD: atraumatic and normocephalic  EYES: no conjunctival pallor or muddiness, no icterus; normal pupil reaction to ambient light  ENT: OMM, no pharyngeal erythema, external bilateral ears WNL; no visible thrush or ulcers  NECK: no masses or swelling, trachea midline, no visible LAD/LN's   CV: no palpitations; no pedal edema; no noticeable JVD or neck vein distension  CHEST: Normal respiratory effort; chest wall breath movements symmetrical; no audible wheezing  ABDOM: non-distended; no bloating  MUSC/Skeletal: ROM normal; joints visibly normal; no deformities or arthropathy  EXTREM: no clubbing, cyanosis, inflammation or swelling  SKIN: no rashes, lesions, ulcers, petechiae or subcutaneous nodules  : no escamilla  NEURO: moving all 4 extremities; AAOx3; no tremors  PSYCH: normal mood, affect and behavior  LYMPH: no visible LN's or LAD              Labs:        Lab Results   Component Value Date    WBC 4.84 12/07/2022    HGB 14.7 12/07/2022    HCT 45.2 12/07/2022    MCV 85 12/07/2022     12/07/2022     BMP  Lab Results   Component Value Date     12/07/2022    K 3.3 (L) 12/07/2022     12/07/2022     CO2 30 (H) 12/07/2022    BUN 19 12/07/2022    CREATININE 1.3 12/07/2022    CALCIUM 9.4 12/07/2022    ANIONGAP 8 12/07/2022    ESTGFRAFRICA >60.0 10/15/2021    EGFRNONAA >60.0 10/15/2021     Lab Results   Component Value Date    ALT 22 12/07/2022    AST 29 12/07/2022    ALKPHOS 60 12/07/2022    BILITOT 1.3 (H) 12/07/2022     Lab Results   Component Value Date    CEA 2.0 12/07/2022           Radiology/Diagnostic Studies:    Ct Scans 12/9/2022:    1. Right pulmonary nodule is unchanged.  2. No adenopathy. There are a few nonenlarged lymph nodes in the central abdomen.         CT 12/3/2021:  IMPRESSION:     No findings of metastatic disease or significant detrimental interval change.     Small noncalcified right-sided pulmonary nodules.     Additional observations as above.         CT scans  10/9/2020:    IMPRESSION:     1.  No acute intra-abdominal abnormalities identified.  2.  Small focal groundglass nodular-like density in the right  lateral ventricle lobes unchanged.  3.  Mild bilateral dependent subsegmental atelectasis.        CT chest scan  3/27/2020:    Impression       Stable soft tissue attenuation noncalcified pulmonary nodules with no new concerning pulmonary nodule or mass identified.  No findings to specifically suggest metastatic disease.           CT scans  9/20/2019:    Impression       No evidence of recurrence or metastatic disease    Stable tiny noncalcified nodules in the right lung           Ct scan 2/1/2019:    IMPRESSION:  1. Interval development of several prominent to borderline enlarged lymph nodes  within the central mesentery and right lower quadrant, nonspecific. These are  suspicious for metastatic disease, given lymphadenopathy seen on the patient's  original preoperative CT of 02/23/2018. Correlation with serum tumor markers,  and consideration for further evaluation with PET CT, are recommended.  2. No additional evidence for metastatic disease in the abdomen or the pelvis.          CT  scans 7/27/2018:    IMPRESSION:    1. Stable appearance of subcentimeter right middle lobe and right upper lobe  pulmonary nodules.  2. Status post partial colonic resection with expected postoperative changes,  as discussed above. No residual mass or pathologically enlarged lymph nodes in  the abdomen or pelvis.  3. Incidental findings as above.            PET/CT: 4/11/2018    IMPRESSION:    1. Persistent mild wall thickening centered about site of ileocolonic  anastomosis with associated FDG uptake is most likely postoperative in nature  and due to residual inflammation. Residual malignancy cannot be excluded on the  basis of imaging alone, and correlation with surgical resection margins is  requested.    2. No current convincing evidence of metastatic disease.    3. 3 mm nodular density which is vague in superior right middle lobe is nonspecific. CT thorax without IV contrast follow-up is recommended within 3-6  months to simply document stability.          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 Comparison: None Findings: Cardiac silhouette size is normal. Lungs are clear without effusion. No pneumothorax. No acute osseous abnormality. IMPRESSION: No acute pulmonary process. Read and electronically signed by: Shubham Marino MD on 2/28/2018 6:22 PM CST SHUBHAM MARINO MD    Ct Abdomen Pelvis With Contrast    Result Date: 2/23/2018  CMS MANDATED QUALITY DATA - CT RADIATION ? 436 All CT scans at this facility utilize dose modulation, iterative reconstruction, and/or weight based dosing when appropriate to reduce radiation dose to as low as reasonably achievable. CLINICAL HISTORY: 52 years (1965) Male K63.9 TECHNIQUE: MDI ABDOMEN AND PELVIS W  CONTRAST CT. 296 images obtained. Axial CT images of the abdomen and pelvis were obtained from the dome of the diaphragm to the proximal thigh.  CONTRAST: 100 mL of IV Omni 350 was administered uneventfully by IV. Oral contrast was also administered COMPARISON: None available. FINDINGS: Lower Thorax: The lung bases are clear. The heart is normal in size and there is no pericardial effusion. CT Abdomen: Liver: The liver is normal size and imaging appearance. Gallbladder: The gallbladder is unremarkable without acute cholecystitis. Biliary Tree: There is no intra or extrahepatic duct dilation. Spleen: The spleen is normal. Pancreas: The pancreas is normal. Adrenal Glands: The adrenal glands appear within normal limits. Kidneys: The kidneys are normal in imaging appearance without hydronephrosis or hydroureter. Vasculature: The aorta is normal in course and caliber. Lymph nodes: No abdominal lymphadenopathy is seen by size criteria. Intraperitoneal structures: There is no ascites. Bowel:  There is an apple core like lesion in the ascending colon extending over a length of approximately 5 cm (10 o'clock-8 o'clock position involving a majority of the circumference) the soft tissue mass measures approximately 2 cm in thickness. There are numerous small rounded lymph nodes in the cecal mesentery, none of which are enlarged by size criteria, however given the morphology and location these may be involved, for example a 15 x 7 mm node (image 121). Abdominal wall: The abdominal wall and musculature are normal. Musculoskeletal: There is a transitional lumbosacral vertebral body (S1) with a hypoplastic disc at S1-S2. There is moderate to severe disc height loss at L4-L5. No aggressive appearing lytic or blastic lesion is identified. CT Pelvis: Bladder: The urinary bladder is within normal limits. Reproductive Organs: The prostate and seminal vesicles are within normal limits. Pelvic Lymph nodes: No pelvic lymphadenopathy or pelvic mass is identified. IMPRESSION: 1. Apple-core like lesion in the descending colon most consistent with a primary colonic adenocarcinoma,  "there is no evidence of obstruction, pneumatosis intra-abdominal free air or abscess. 2. No lymphadenopathy by size criteria and no finding to specifically suggest metastatic disease in the abdomen or pelvis. Read and electronically signed by: Singh Colvin MD on 2/23/2018 9:53 AM CST SINGH COLVIN MD      I have reviewed all available lab results and radiology reports.    Assessment/Plan:   (1) 57y.o. male with diagnosis of right colon mass found on recent colonoscopy on 2/16/2018 with Dr Johnson.  - he had been seen by Dr Gutierrez with [facility]   - s/p right colectomy with Dr Gutierrez on 3/7/2018  - 27 LN's were all negative  - T3 N0 and Stage IIA  - low grade adenocarcinoma  - PET scan on 4/11/2018 with no definitive evidence of ant metastatic process  - repeat CEA was 2.1  - will most likely not need chemotherapy if he remains a Stage II  - MMR was negative but all of all JESSICA was "stable" which has been shown to illicit a poorer prognosis in general but would not changes the current plan of therapy (discussed with the patient in detail)  - prior CT scans on 7/27/2018 which appeared to be stable; however, repeat CT on 2/1/2019 showed some increase in LN's which needed to be addressed with PET scans but his insurance declined   - latest CT's on 9/20/2019 with no evidence of recurrent cancer  - last colonoscopy was good per patient with another one planned for 3 yrs      10/12/2020:  - latest CT scans on 10/9/2020 appear to be stable  - CEA level good at 1.4  - no new bowel issues  - repeat scope in about 2 yrs per patient    4/29/2021:  - he had recent labs done with Dr Driver but did not have CEA done  - he was supposed to get repeat scans this month but has not done so    10/28/2021:  - insurance denied approval for his PET  - will look into getting CT scans instead  - CEA was 1.7  - he sees GI again in March 2022    5/10/2022:  - He last saw Dr Hoang on  3/18/2022   - he is seeing Dr Johnson " again in June 2022 for repeat scopes  -  He last had CT scans on 12/3/2021  - CEA was at 1.6    12/13/2022:  - recent CT scans on 12/9 appear to be relatively stable  - CEA was 2.0  - he was supposed to get repeat scope in June 2022 but did not due to insurance  - asked patient to reach out to GI again            (2) HTN and hypercholesterolemia     (3) Prior mini-CVA - HTN related; no subsequent deficits; sounds like he had a TIA    (4) RML lung nodule on PEt at 3mm with no FDG uptake; CT stable;  - he last saw Dr Crockett with pulmonary on 4/2/2019  - latest Ct was stable on 9/20/2019 and again on 3/27/2020    1. Primary adenocarcinoma of ascending colon        2. Multiple pulmonary nodules              PLAN:  1. Repeat scans again in Dec 2023; asked patient to reach out to Dr Johnson again for setting up repeat scope  2. F/u with GI, PCP, [facility], pulm etc  3. F/u with GI   4. Encouraged compliance with f/u with Pulm, etc  5. Check up to date labs incl CEA every 6 months  6. RTC 6 months    Fax note to Jose Johnson Jr, MD, [facility]    Discussion:       Total Time spent on patient:    I spent over 25 mins of time with the patient. Reviewed results of the recently ordered labs, tests, reports and studies; made directives with regards to the results. Over half of this time was spent couseling and coordinating care, making treatment and analytical decisions; ordering necessary labs, tests and studies; and discussing treatment options and setting up treatment plan(s) if indicated.    COVID-19 Discussion:    I had long discussion with patient and any applicable family about the COVID-19 coronavirus epidemic and the recommended precautions with regard to cancer and/or hematology patients. I have re-iterated the CDC recommendations for adequate hand washing, use of hand -like products, and coughing into elbow, etc. In addition, especially for our patients who are on  chemotherapy and/or our otherwise immunocompromised patients, I have recommended avoidance of crowds, including movie theaters, restaurants, churches, etc. I have recommended avoidance of any sick or symptomatic family members and/or friends. I have also recommended avoidance of any raw and unwashed food products, and general avoidance of food items that have not been prepared by themselves. The patient has been asked to call us immediately with any symptom developments, issues, questions or other general concerns.            I have explained all of the above in detail and the patient understands all of the current recommendation(s). I have answered all of their questions to the best of my ability and to their complete satisfaction.   The patient is to continue with the current management plan.            Electronically signed by Carlo Welch MD

## 2022-12-13 ENCOUNTER — OFFICE VISIT (OUTPATIENT)
Dept: HEMATOLOGY/ONCOLOGY | Facility: CLINIC | Age: 57
End: 2022-12-13
Payer: COMMERCIAL

## 2022-12-13 VITALS
BODY MASS INDEX: 37.4 KG/M2 | WEIGHT: 246 LBS | SYSTOLIC BLOOD PRESSURE: 147 MMHG | HEART RATE: 101 BPM | DIASTOLIC BLOOD PRESSURE: 91 MMHG | TEMPERATURE: 98 F

## 2022-12-13 DIAGNOSIS — R91.8 MULTIPLE PULMONARY NODULES: ICD-10-CM

## 2022-12-13 DIAGNOSIS — C18.2 PRIMARY ADENOCARCINOMA OF ASCENDING COLON: Primary | ICD-10-CM

## 2022-12-13 PROCEDURE — 3080F DIAST BP >= 90 MM HG: CPT | Mod: CPTII,S$GLB,, | Performed by: INTERNAL MEDICINE

## 2022-12-13 PROCEDURE — 3008F PR BODY MASS INDEX (BMI) DOCUMENTED: ICD-10-PCS | Mod: CPTII,S$GLB,, | Performed by: INTERNAL MEDICINE

## 2022-12-13 PROCEDURE — 3077F PR MOST RECENT SYSTOLIC BLOOD PRESSURE >= 140 MM HG: ICD-10-PCS | Mod: CPTII,S$GLB,, | Performed by: INTERNAL MEDICINE

## 2022-12-13 PROCEDURE — 3080F PR MOST RECENT DIASTOLIC BLOOD PRESSURE >= 90 MM HG: ICD-10-PCS | Mod: CPTII,S$GLB,, | Performed by: INTERNAL MEDICINE

## 2022-12-13 PROCEDURE — 1159F MED LIST DOCD IN RCRD: CPT | Mod: CPTII,S$GLB,, | Performed by: INTERNAL MEDICINE

## 2022-12-13 PROCEDURE — 1159F PR MEDICATION LIST DOCUMENTED IN MEDICAL RECORD: ICD-10-PCS | Mod: CPTII,S$GLB,, | Performed by: INTERNAL MEDICINE

## 2022-12-13 PROCEDURE — 3077F SYST BP >= 140 MM HG: CPT | Mod: CPTII,S$GLB,, | Performed by: INTERNAL MEDICINE

## 2022-12-13 PROCEDURE — 99214 OFFICE O/P EST MOD 30 MIN: CPT | Mod: S$GLB,,, | Performed by: INTERNAL MEDICINE

## 2022-12-13 PROCEDURE — 3008F BODY MASS INDEX DOCD: CPT | Mod: CPTII,S$GLB,, | Performed by: INTERNAL MEDICINE

## 2022-12-13 PROCEDURE — 99214 PR OFFICE/OUTPT VISIT, EST, LEVL IV, 30-39 MIN: ICD-10-PCS | Mod: S$GLB,,, | Performed by: INTERNAL MEDICINE

## 2022-12-13 PROCEDURE — 1160F RVW MEDS BY RX/DR IN RCRD: CPT | Mod: CPTII,S$GLB,, | Performed by: INTERNAL MEDICINE

## 2022-12-13 PROCEDURE — 1160F PR REVIEW ALL MEDS BY PRESCRIBER/CLIN PHARMACIST DOCUMENTED: ICD-10-PCS | Mod: CPTII,S$GLB,, | Performed by: INTERNAL MEDICINE

## 2023-01-20 ENCOUNTER — OFFICE VISIT (OUTPATIENT)
Dept: FAMILY MEDICINE | Facility: CLINIC | Age: 58
End: 2023-01-20
Payer: COMMERCIAL

## 2023-01-20 VITALS
WEIGHT: 244 LBS | OXYGEN SATURATION: 95 % | HEART RATE: 82 BPM | BODY MASS INDEX: 36.98 KG/M2 | SYSTOLIC BLOOD PRESSURE: 110 MMHG | HEIGHT: 68 IN | TEMPERATURE: 99 F | DIASTOLIC BLOOD PRESSURE: 74 MMHG

## 2023-01-20 DIAGNOSIS — E87.6 HYPOKALEMIA: ICD-10-CM

## 2023-01-20 DIAGNOSIS — Z85.038 HISTORY OF COLON CANCER: ICD-10-CM

## 2023-01-20 DIAGNOSIS — E78.00 PURE HYPERCHOLESTEROLEMIA: ICD-10-CM

## 2023-01-20 DIAGNOSIS — R42 VERTIGO: ICD-10-CM

## 2023-01-20 DIAGNOSIS — I10 ESSENTIAL HYPERTENSION: Primary | ICD-10-CM

## 2023-01-20 PROCEDURE — 3008F PR BODY MASS INDEX (BMI) DOCUMENTED: ICD-10-PCS | Mod: CPTII,S$GLB,, | Performed by: INTERNAL MEDICINE

## 2023-01-20 PROCEDURE — 1159F PR MEDICATION LIST DOCUMENTED IN MEDICAL RECORD: ICD-10-PCS | Mod: CPTII,S$GLB,, | Performed by: INTERNAL MEDICINE

## 2023-01-20 PROCEDURE — 3008F BODY MASS INDEX DOCD: CPT | Mod: CPTII,S$GLB,, | Performed by: INTERNAL MEDICINE

## 2023-01-20 PROCEDURE — 99214 OFFICE O/P EST MOD 30 MIN: CPT | Mod: S$GLB,,, | Performed by: INTERNAL MEDICINE

## 2023-01-20 PROCEDURE — 3074F SYST BP LT 130 MM HG: CPT | Mod: CPTII,S$GLB,, | Performed by: INTERNAL MEDICINE

## 2023-01-20 PROCEDURE — 3078F PR MOST RECENT DIASTOLIC BLOOD PRESSURE < 80 MM HG: ICD-10-PCS | Mod: CPTII,S$GLB,, | Performed by: INTERNAL MEDICINE

## 2023-01-20 PROCEDURE — 3078F DIAST BP <80 MM HG: CPT | Mod: CPTII,S$GLB,, | Performed by: INTERNAL MEDICINE

## 2023-01-20 PROCEDURE — 3074F PR MOST RECENT SYSTOLIC BLOOD PRESSURE < 130 MM HG: ICD-10-PCS | Mod: CPTII,S$GLB,, | Performed by: INTERNAL MEDICINE

## 2023-01-20 PROCEDURE — 1159F MED LIST DOCD IN RCRD: CPT | Mod: CPTII,S$GLB,, | Performed by: INTERNAL MEDICINE

## 2023-01-20 PROCEDURE — 99214 PR OFFICE/OUTPT VISIT, EST, LEVL IV, 30-39 MIN: ICD-10-PCS | Mod: S$GLB,,, | Performed by: INTERNAL MEDICINE

## 2023-01-20 RX ORDER — ATORVASTATIN CALCIUM 40 MG/1
40 TABLET, FILM COATED ORAL DAILY
Qty: 90 TABLET | Refills: 1 | Status: SHIPPED | OUTPATIENT
Start: 2023-01-20 | End: 2023-06-21 | Stop reason: SDUPTHER

## 2023-01-20 RX ORDER — LABETALOL 200 MG/1
200 TABLET, FILM COATED ORAL 2 TIMES DAILY
Qty: 180 TABLET | Refills: 1 | Status: SHIPPED | OUTPATIENT
Start: 2023-01-20 | End: 2023-06-21 | Stop reason: SDUPTHER

## 2023-01-20 RX ORDER — POTASSIUM CHLORIDE 20 MEQ/1
20 TABLET, EXTENDED RELEASE ORAL 3 TIMES DAILY
Qty: 270 TABLET | Refills: 1 | Status: SHIPPED | OUTPATIENT
Start: 2023-01-20 | End: 2023-06-21 | Stop reason: SDUPTHER

## 2023-01-20 RX ORDER — AZILSARTAN KAMEDOXOMIL AND CHLORTHALIDONE 40; 25 MG/1; MG/1
1 TABLET ORAL DAILY
Qty: 90 TABLET | Refills: 1 | Status: SHIPPED | OUTPATIENT
Start: 2023-01-20 | End: 2023-12-19 | Stop reason: SDUPTHER

## 2023-01-20 RX ORDER — MECLIZINE HYDROCHLORIDE 25 MG/1
25 TABLET ORAL 3 TIMES DAILY PRN
Qty: 30 TABLET | Refills: 1 | Status: SHIPPED | OUTPATIENT
Start: 2023-01-20 | End: 2023-06-21 | Stop reason: SDUPTHER

## 2023-01-20 RX ORDER — AMLODIPINE BESYLATE 10 MG/1
10 TABLET ORAL DAILY
Qty: 90 TABLET | Refills: 1 | Status: SHIPPED | OUTPATIENT
Start: 2023-01-20 | End: 2023-06-21 | Stop reason: SDUPTHER

## 2023-01-20 NOTE — PROGRESS NOTES
Subjective:       Patient ID: Vince Kwon is a 57 y.o. male.    Chief Complaint: Hypertension (5 months follow up)    Here for routine follow up; last visit note, most recent available labs, and health maintenance topics reviewed.   Followed by Heme/Onc and GI for h/o colon cancer.  His colonoscopy is due but he was told his insurance wouldn't cover it.      Hypertension  This is a chronic problem. The problem is controlled (120s/80s at home). Pertinent negatives include no anxiety, chest pain, headaches, malaise/fatigue, neck pain, palpitations, peripheral edema or shortness of breath. Risk factors for coronary artery disease include male gender, obesity and dyslipidemia. Past treatments include beta blockers, calcium channel blockers, diuretics and angiotensin blockers. The current treatment provides significant improvement.   Hyperlipidemia  This is a chronic problem. The problem is controlled. Recent lipid tests were reviewed and are normal. Exacerbating diseases include obesity. Pertinent negatives include no chest pain, myalgias or shortness of breath. Current antihyperlipidemic treatment includes statins. The current treatment provides mild improvement of lipids. There are no compliance problems.    Review of Systems   Constitutional:  Negative for activity change, appetite change, chills, diaphoresis, fatigue, fever, malaise/fatigue and unexpected weight change.   HENT:  Negative for congestion, ear discharge, ear pain, hearing loss, nosebleeds, postnasal drip, rhinorrhea, sinus pressure, sinus pain, sneezing, sore throat, tinnitus, trouble swallowing and voice change.    Eyes:  Negative for photophobia, pain, discharge, redness, itching and visual disturbance.   Respiratory:  Negative for apnea, cough, choking, chest tightness, shortness of breath and wheezing.    Cardiovascular:  Negative for chest pain, palpitations and leg swelling.   Gastrointestinal:  Negative for abdominal distention,  abdominal pain, blood in stool, constipation, diarrhea, nausea and vomiting.   Endocrine: Negative for cold intolerance, heat intolerance, polydipsia and polyuria.   Genitourinary:  Negative for decreased urine volume, difficulty urinating, dysuria, enuresis, flank pain, frequency, genital sores, hematuria, penile discharge, penile pain, scrotal swelling, testicular pain and urgency.   Musculoskeletal:  Negative for arthralgias, back pain, gait problem, joint swelling, myalgias, neck pain and neck stiffness.   Skin:  Negative for rash and wound.   Allergic/Immunologic: Negative for environmental allergies, food allergies and immunocompromised state.   Neurological:  Negative for dizziness, tremors, seizures, syncope, facial asymmetry, speech difficulty, weakness, light-headedness, numbness and headaches.   Hematological:  Negative for adenopathy. Does not bruise/bleed easily.   Psychiatric/Behavioral:  Negative for confusion, decreased concentration, hallucinations, self-injury, sleep disturbance and suicidal ideas. The patient is not nervous/anxious.      Past Medical History:   Diagnosis Date    Colon tumor     Hyperlipidemia     Hypertension     Hypokalemia 3/1/2018    Multiple pulmonary nodules 8/10/2018      Past Surgical History:   Procedure Laterality Date    COLON SURGERY  03/07/2018    COLONOSCOPY      FINGER SURGERY      RIGHT COLECTOMY Right 03/07/2018           Family History   Problem Relation Age of Onset    Diabetes Mother     Heart disease Mother     Diabetes Sister     Breast cancer Sister     Diabetes Maternal Aunt     Diabetes Maternal Grandmother     Heart disease Maternal Grandmother     Heart disease Maternal Grandfather        Social History     Socioeconomic History    Marital status: Single   Occupational History    Occupation: shipping and recieving   Tobacco Use    Smoking status: Former    Smokeless tobacco: Never   Substance and Sexual Activity    Alcohol use: Yes     Comment:  "occ    Drug use: No    Sexual activity: Yes     Partners: Female   Social History Narrative    Lives alone       Current Outpatient Medications   Medication Sig Dispense Refill    ascorbic acid, vitamin C, (VITAMIN C) 1000 MG tablet Take 1,000 mg by mouth once daily.      aspirin (ECOTRIN) 81 MG EC tablet Take 81 mg by mouth.      magnesium 30 mg Tab Take 1 tablet by mouth once.      zinc 50 mg Tab Take by mouth.      amLODIPine (NORVASC) 10 MG tablet Take 1 tablet (10 mg total) by mouth once daily. 90 tablet 1    atorvastatin (LIPITOR) 40 MG tablet Take 1 tablet (40 mg total) by mouth once daily. Take one tablet daily. 90 tablet 1    azilsartan med-chlorthalidone (EDARBYCLOR) 40-25 mg Tab Take 1 tablet by mouth once daily. 90 tablet 1    labetaloL (NORMODYNE) 200 MG tablet Take 1 tablet (200 mg total) by mouth 2 (two) times daily. 180 tablet 1    meclizine (ANTIVERT) 25 mg tablet Take 1 tablet (25 mg total) by mouth 3 (three) times daily as needed. 30 tablet 1    potassium chloride SA (K-DUR,KLOR-CON) 20 MEQ tablet Take 1 tablet (20 mEq total) by mouth 3 (three) times daily. 270 tablet 1     No current facility-administered medications for this visit.       Review of patient's allergies indicates:  No Known Allergies  Objective:    HPI     Hypertension     Additional comments: 5 months follow up          Last edited by Jose R Willis MA on 1/20/2023  9:18 AM.      Blood pressure 110/74, pulse 82, temperature 98.8 °F (37.1 °C), temperature source Temporal, height 5' 8" (1.727 m), weight 110.7 kg (244 lb), SpO2 95 %. Body mass index is 37.1 kg/m².   Physical Exam  Vitals and nursing note reviewed.   Constitutional:       General: He is not in acute distress.     Appearance: He is well-developed. He is obese. He is not ill-appearing, toxic-appearing or diaphoretic.   HENT:      Head: Normocephalic and atraumatic.   Eyes:      General: No scleral icterus.        Right eye: No discharge.         Left eye: No discharge. "      Conjunctiva/sclera: Conjunctivae normal.   Neck:      Vascular: No carotid bruit.   Cardiovascular:      Rate and Rhythm: Normal rate and regular rhythm.      Heart sounds: Normal heart sounds. No murmur heard.  Pulmonary:      Effort: Pulmonary effort is normal. No respiratory distress.      Breath sounds: Normal breath sounds. No decreased breath sounds, wheezing, rhonchi or rales.   Abdominal:      General: There is no distension.      Palpations: Abdomen is soft.      Tenderness: There is no abdominal tenderness. There is no guarding or rebound.   Musculoskeletal:      Right lower leg: No edema.      Left lower leg: No edema.   Skin:     General: Skin is warm and dry.   Neurological:      Mental Status: He is alert.      Motor: No tremor.   Psychiatric:         Mood and Affect: Mood normal.         Speech: Speech normal.         Behavior: Behavior normal.         No visits with results within 1 Month(s) from this visit.   Latest known visit with results is:   Lab Visit on 12/07/2022   Component Date Value Ref Range Status    WBC 12/07/2022 4.84  3.90 - 12.70 K/uL Final    RBC 12/07/2022 5.33  4.60 - 6.20 M/uL Final    Hemoglobin 12/07/2022 14.7  14.0 - 18.0 g/dL Final    Hematocrit 12/07/2022 45.2  40.0 - 54.0 % Final    MCV 12/07/2022 85  82 - 98 fL Final    MCH 12/07/2022 27.6  27.0 - 31.0 pg Final    MCHC 12/07/2022 32.5  32.0 - 36.0 g/dL Final    RDW 12/07/2022 13.3  11.5 - 14.5 % Final    Platelets 12/07/2022 237  150 - 450 K/uL Final    MPV 12/07/2022 9.0 (L)  9.2 - 12.9 fL Final    Immature Granulocytes 12/07/2022 0.2  0.0 - 0.5 % Final    Gran # (ANC) 12/07/2022 2.3  1.8 - 7.7 K/uL Final    Immature Grans (Abs) 12/07/2022 0.01  0.00 - 0.04 K/uL Final    Comment: Mild elevation in immature granulocytes is non specific and   can be seen in a variety of conditions including stress response,   acute inflammation, trauma and pregnancy. Correlation with other   laboratory and clinical findings is  essential.      Lymph # 12/07/2022 2.0  1.0 - 4.8 K/uL Final    Mono # 12/07/2022 0.5  0.3 - 1.0 K/uL Final    Eos # 12/07/2022 0.1  0.0 - 0.5 K/uL Final    Baso # 12/07/2022 0.02  0.00 - 0.20 K/uL Final    nRBC 12/07/2022 0  0 /100 WBC Final    Gran % 12/07/2022 47.2  38.0 - 73.0 % Final    Lymph % 12/07/2022 40.7  18.0 - 48.0 % Final    Mono % 12/07/2022 10.3  4.0 - 15.0 % Final    Eosinophil % 12/07/2022 1.2  0.0 - 8.0 % Final    Basophil % 12/07/2022 0.4  0.0 - 1.9 % Final    Differential Method 12/07/2022 Automated   Final    Sodium 12/07/2022 138  136 - 145 mmol/L Final    Potassium 12/07/2022 3.3 (L)  3.5 - 5.1 mmol/L Final    Chloride 12/07/2022 100  95 - 110 mmol/L Final    CO2 12/07/2022 30 (H)  23 - 29 mmol/L Final    Glucose 12/07/2022 98  70 - 110 mg/dL Final    BUN 12/07/2022 19  6 - 20 mg/dL Final    Creatinine 12/07/2022 1.3  0.5 - 1.4 mg/dL Final    Calcium 12/07/2022 9.4  8.7 - 10.5 mg/dL Final    Total Protein 12/07/2022 7.7  6.0 - 8.4 g/dL Final    Albumin 12/07/2022 4.4  3.5 - 5.2 g/dL Final    Total Bilirubin 12/07/2022 1.3 (H)  0.1 - 1.0 mg/dL Final    Comment: For infants and newborns, interpretation of results should be based  on gestational age, weight and in agreement with clinical  observations.    Premature Infant recommended reference ranges:  Up to 24 hours.............<8.0 mg/dL  Up to 48 hours............<12.0 mg/dL  3-5 days..................<15.0 mg/dL  6-29 days.................<15.0 mg/dL      Alkaline Phosphatase 12/07/2022 60  55 - 135 U/L Final    AST 12/07/2022 29  10 - 40 U/L Final    ALT 12/07/2022 22  10 - 44 U/L Final    Anion Gap 12/07/2022 8  8 - 16 mmol/L Final    eGFR 12/07/2022 >60.0  >60 mL/min/1.73 m^2 Final    CEA 12/07/2022 2.0  0.0 - 5.0 ng/mL Final    Comment: CEA Normal Range:  Non-Smokers: 0-3.0 ng/mL  Smokers:     0-5.0 ng/mL    The testing method is a chemiluminescent microparticle immunoassay   manufactured by [employer] and performed on the     or   Close system. Values obtained with different assay manufacturers   for   methods may be different and cannot be used interchangeably.     ]  Assessment:       1. Essential hypertension    2. Hypokalemia    3. Vertigo    4. Pure hypercholesterolemia    5. History of colon cancer        Plan:       Vince was seen today for hypertension.    Diagnoses and all orders for this visit:    Essential hypertension  -     azilsartan med-chlorthalidone (EDARBYCLOR) 40-25 mg Tab; Take 1 tablet by mouth once daily.  -     labetaloL (NORMODYNE) 200 MG tablet; Take 1 tablet (200 mg total) by mouth 2 (two) times daily.  -     amLODIPine (NORVASC) 10 MG tablet; Take 1 tablet (10 mg total) by mouth once daily.  -     Comprehensive Metabolic Panel; Future  -     Lipid Panel; Future  -     Urinalysis; Future    Hypokalemia  Comments:  Remains low despite high doses of potassium but I am leery of increasing dose further  Orders:  -     potassium chloride SA (K-DUR,KLOR-CON) 20 MEQ tablet; Take 1 tablet (20 mEq total) by mouth 3 (three) times daily.  -     Comprehensive Metabolic Panel; Future    Vertigo  Comments:  Continue mecilizine as needed.  Usually self limited.   Orders:  -     meclizine (ANTIVERT) 25 mg tablet; Take 1 tablet (25 mg total) by mouth 3 (three) times daily as needed.    Pure hypercholesterolemia  Comments:  Recheck lipids with next labs  Orders:  -     atorvastatin (LIPITOR) 40 MG tablet; Take 1 tablet (40 mg total) by mouth once daily. Take one tablet daily.  -     Comprehensive Metabolic Panel; Future  -     Lipid Panel; Future    History of colon cancer  Comments:  Contact GI to get them to r/s since it is a new plan year and see if that matters.  May also need to contact insurance directly himself.

## 2023-04-21 ENCOUNTER — OFFICE VISIT (OUTPATIENT)
Dept: URGENT CARE | Facility: CLINIC | Age: 58
End: 2023-04-21

## 2023-04-21 VITALS
BODY MASS INDEX: 37.44 KG/M2 | DIASTOLIC BLOOD PRESSURE: 89 MMHG | HEART RATE: 87 BPM | TEMPERATURE: 99 F | RESPIRATION RATE: 19 BRPM | WEIGHT: 247 LBS | HEIGHT: 68 IN | OXYGEN SATURATION: 97 % | SYSTOLIC BLOOD PRESSURE: 152 MMHG

## 2023-04-21 DIAGNOSIS — H15.121 NODULAR EPISCLERITIS OF RIGHT EYE: Primary | ICD-10-CM

## 2023-04-21 DIAGNOSIS — H10.9 CONJUNCTIVITIS OF RIGHT EYE, UNSPECIFIED CONJUNCTIVITIS TYPE: ICD-10-CM

## 2023-04-21 DIAGNOSIS — H11.001 PTERYGIUM EYE, RIGHT: ICD-10-CM

## 2023-04-21 DIAGNOSIS — T26.31XA: ICD-10-CM

## 2023-04-21 PROCEDURE — 99214 OFFICE O/P EST MOD 30 MIN: CPT | Mod: TIER,S$GLB,, | Performed by: NURSE PRACTITIONER

## 2023-04-21 PROCEDURE — 99214 PR OFFICE/OUTPT VISIT, EST, LEVL IV, 30-39 MIN: ICD-10-PCS | Mod: TIER,S$GLB,, | Performed by: NURSE PRACTITIONER

## 2023-04-21 RX ORDER — GENTAMICIN SULFATE 3 MG/ML
2 SOLUTION/ DROPS OPHTHALMIC EVERY 4 HOURS
Qty: 5 ML | Refills: 0 | Status: SHIPPED | OUTPATIENT
Start: 2023-04-21 | End: 2023-04-28

## 2023-04-21 NOTE — PROGRESS NOTES
"Subjective:      Patient ID: Vince Kwon is a 57 y.o. male.    Vitals:  height is 5' 8" (1.727 m) and weight is 112 kg (247 lb). His oral temperature is 98.7 °F (37.1 °C). His blood pressure is 152/89 (abnormal) and his pulse is 87. His respiration is 19 and oxygen saturation is 97%.     Chief Complaint: Foreign Body in Eye (Pt was welding on Weds, and is wondering if through his face shield, metal got through into OD.)    Vince Kwon presents to clinic with redness and irritation as well as the appearance of a "pus pocket" next to the Iris of his right eye.  Patient reports that 2 days ago he was sanding something and although he did have his shield on he believes that a hot piece of metal flew beneath the shield and hit him in the right eye.  Patient denies feeling like there is a foreign body in the eye and also denies any pain at rest and with movement, however he is concerned about the redness and lesion on the right eye.  Patient denies any significant history of problems with his eyes and does not have a dedicated ophthalmologist.    Foreign Body in Eye  This is a new problem. The current episode started in the past 7 days (Weds). The problem occurs constantly. The problem has been gradually worsening. Associated symptoms comments: Pt denies blurred vision. Just a "black dot" in vision.     Constitution: Negative.   HENT: Negative.     Neck: neck negative.   Cardiovascular: Negative.    Eyes:  Positive for eye trauma and eye redness. Negative for eye pain (irritation only).   Respiratory: Negative.     Gastrointestinal: Negative.    Endocrine: negative.   Genitourinary: Negative.    Musculoskeletal: Negative.    Skin: Negative.     Objective:     Physical Exam   Constitutional: He is oriented to person, place, and time. He appears well-developed.   HENT:   Head: Normocephalic and atraumatic.   Ears:   Right Ear: External ear normal.   Left Ear: External ear normal.   Nose: Nose normal. "   Mouth/Throat: Oropharynx is clear and moist.   Eyes: EOM and lids are normal. Pupils are equal, round, and reactive to light. Lids are everted and swept, no foreign bodies found. Right eye exhibits exudate. Right conjunctiva is injected. Right eye exhibits no nystagmus. Left eye exhibits no nystagmus.     Extraocular movement intact vision grossly intact      Comments: Snellen: 20/20    Neck: Trachea normal and phonation normal. Neck supple.   Musculoskeletal: Normal range of motion.         General: Normal range of motion.   Neurological: He is alert and oriented to person, place, and time.   Skin: Skin is warm, dry and intact.   Psychiatric: His speech is normal and behavior is normal. Judgment and thought content normal.   Nursing note and vitals reviewed.    Assessment:     1. Nodular episcleritis of right eye    2. Pterygium eye, right    3. Conjunctivitis of right eye, unspecified conjunctivitis type    4. Burn of sclera of right eye, initial encounter        Plan:       Nodular episcleritis of right eye  -     gentamicin (GARAMYCIN) 0.3 % ophthalmic solution; Place 2 drops into the right eye every 4 (four) hours. for 7 days  Dispense: 5 mL; Refill: 0    Pterygium eye, right    Conjunctivitis of right eye, unspecified conjunctivitis type  -     gentamicin (GARAMYCIN) 0.3 % ophthalmic solution; Place 2 drops into the right eye every 4 (four) hours. for 7 days  Dispense: 5 mL; Refill: 0    Burn of sclera of right eye, initial encounter  -     gentamicin (GARAMYCIN) 0.3 % ophthalmic solution; Place 2 drops into the right eye every 4 (four) hours. for 7 days  Dispense: 5 mL; Refill: 0      Patient reports that he has never been diagnosed with pterygium in the past and the nodule that brought him in today in the right eye was not present prior to his work incident on Wednesday.  An appointment has been made for the patient to see an ophthalmologist on 4/26/2023 at [facility].  Patient states that due to  his work schedule he is unable to go to see the ophthalmologist on Monday at 8:30 a.m., which was their earliest appointment.  Patient understands to proceed to the emergency room immediately if symptoms fail to improve or worsen.

## 2023-06-05 ENCOUNTER — TELEPHONE (OUTPATIENT)
Dept: HEMATOLOGY/ONCOLOGY | Facility: CLINIC | Age: 58
End: 2023-06-05

## 2023-06-05 NOTE — TELEPHONE ENCOUNTER
Talked to pt about labs needed for appt   Pt said he will need a new appt bc he just started a new job   Message sent to scheduling.

## 2023-06-21 ENCOUNTER — OFFICE VISIT (OUTPATIENT)
Dept: FAMILY MEDICINE | Facility: CLINIC | Age: 58
End: 2023-06-21
Payer: COMMERCIAL

## 2023-06-21 VITALS
BODY MASS INDEX: 37.13 KG/M2 | TEMPERATURE: 98 F | HEART RATE: 80 BPM | HEIGHT: 68 IN | OXYGEN SATURATION: 95 % | WEIGHT: 245 LBS

## 2023-06-21 DIAGNOSIS — E87.6 HYPOKALEMIA: ICD-10-CM

## 2023-06-21 DIAGNOSIS — I10 ESSENTIAL HYPERTENSION: ICD-10-CM

## 2023-06-21 DIAGNOSIS — E78.00 PURE HYPERCHOLESTEROLEMIA: ICD-10-CM

## 2023-06-21 DIAGNOSIS — R42 VERTIGO: ICD-10-CM

## 2023-06-21 PROCEDURE — 3008F BODY MASS INDEX DOCD: CPT | Mod: CPTII,S$GLB,, | Performed by: INTERNAL MEDICINE

## 2023-06-21 PROCEDURE — 99213 OFFICE O/P EST LOW 20 MIN: CPT | Mod: S$GLB,,, | Performed by: INTERNAL MEDICINE

## 2023-06-21 PROCEDURE — 1159F PR MEDICATION LIST DOCUMENTED IN MEDICAL RECORD: ICD-10-PCS | Mod: CPTII,S$GLB,, | Performed by: INTERNAL MEDICINE

## 2023-06-21 PROCEDURE — 1159F MED LIST DOCD IN RCRD: CPT | Mod: CPTII,S$GLB,, | Performed by: INTERNAL MEDICINE

## 2023-06-21 PROCEDURE — 3008F PR BODY MASS INDEX (BMI) DOCUMENTED: ICD-10-PCS | Mod: CPTII,S$GLB,, | Performed by: INTERNAL MEDICINE

## 2023-06-21 PROCEDURE — 99213 PR OFFICE/OUTPT VISIT, EST, LEVL III, 20-29 MIN: ICD-10-PCS | Mod: S$GLB,,, | Performed by: INTERNAL MEDICINE

## 2023-06-21 RX ORDER — ATORVASTATIN CALCIUM 40 MG/1
40 TABLET, FILM COATED ORAL DAILY
Qty: 90 TABLET | Refills: 1 | Status: SHIPPED | OUTPATIENT
Start: 2023-06-21 | End: 2023-12-19 | Stop reason: SDUPTHER

## 2023-06-21 RX ORDER — POTASSIUM CHLORIDE 20 MEQ/1
20 TABLET, EXTENDED RELEASE ORAL 3 TIMES DAILY
Qty: 270 TABLET | Refills: 1 | Status: SHIPPED | OUTPATIENT
Start: 2023-06-21 | End: 2023-12-19 | Stop reason: SDUPTHER

## 2023-06-21 RX ORDER — AZILSARTAN KAMEDOXOMIL AND CHLORTHALIDONE 40; 25 MG/1; MG/1
1 TABLET ORAL DAILY
Qty: 90 TABLET | Refills: 1 | Status: CANCELLED | OUTPATIENT
Start: 2023-06-21

## 2023-06-21 RX ORDER — LABETALOL 200 MG/1
200 TABLET, FILM COATED ORAL 2 TIMES DAILY
Qty: 180 TABLET | Refills: 1 | Status: SHIPPED | OUTPATIENT
Start: 2023-06-21 | End: 2023-12-19 | Stop reason: SDUPTHER

## 2023-06-21 RX ORDER — MECLIZINE HYDROCHLORIDE 25 MG/1
25 TABLET ORAL 3 TIMES DAILY PRN
Qty: 30 TABLET | Refills: 1 | Status: SHIPPED | OUTPATIENT
Start: 2023-06-21 | End: 2023-12-19

## 2023-06-21 RX ORDER — AMLODIPINE BESYLATE 10 MG/1
10 TABLET ORAL DAILY
Qty: 90 TABLET | Refills: 1 | Status: SHIPPED | OUTPATIENT
Start: 2023-06-21 | End: 2023-12-19 | Stop reason: SDUPTHER

## 2023-06-21 NOTE — PROGRESS NOTES
Subjective:       Patient ID: Vince Kwon is a 57 y.o. male.    Chief Complaint: Hypertension (5 months follow up)    Here for routine follow up; last visit note, most recent available labs, and health maintenance topics reviewed.   He hasn't had labs ordered last visit; he was waiting for new insurance to take effect.  Followed by Heme/Onc and GI for h/o colon cancer.  His colonoscopy is due but he was told his insurance wouldn't cover it.      Hypertension  This is a chronic problem. The problem is controlled (120s/80s at home). Pertinent negatives include no anxiety, chest pain, headaches, malaise/fatigue, neck pain, palpitations, peripheral edema or shortness of breath. Risk factors for coronary artery disease include male gender, obesity and dyslipidemia. Past treatments include beta blockers, calcium channel blockers, diuretics and angiotensin blockers. The current treatment provides significant improvement.   Hyperlipidemia  This is a chronic problem. The problem is controlled. Recent lipid tests were reviewed and are normal. Exacerbating diseases include obesity. Pertinent negatives include no chest pain, myalgias or shortness of breath. Current antihyperlipidemic treatment includes statins. The current treatment provides mild improvement of lipids. There are no compliance problems.    Review of Systems   Constitutional:  Negative for activity change, appetite change, chills, diaphoresis, fatigue, fever, malaise/fatigue and unexpected weight change.   HENT:  Negative for congestion, ear discharge, ear pain, hearing loss, nosebleeds, postnasal drip, rhinorrhea, sinus pressure, sinus pain, sneezing, sore throat, tinnitus, trouble swallowing and voice change.    Eyes:  Negative for photophobia, pain, discharge, redness, itching and visual disturbance.   Respiratory:  Negative for apnea, cough, choking, chest tightness, shortness of breath and wheezing.    Cardiovascular:  Negative for chest pain,  palpitations and leg swelling.   Gastrointestinal:  Negative for abdominal distention, abdominal pain, blood in stool, constipation, diarrhea, nausea and vomiting.   Endocrine: Negative for cold intolerance, heat intolerance, polydipsia and polyuria.   Genitourinary:  Negative for decreased urine volume, difficulty urinating, dysuria, enuresis, flank pain, frequency, genital sores, hematuria, penile discharge, penile pain, scrotal swelling, testicular pain and urgency.   Musculoskeletal:  Negative for arthralgias, back pain, gait problem, joint swelling, myalgias, neck pain and neck stiffness.   Skin:  Negative for rash and wound.   Allergic/Immunologic: Negative for environmental allergies, food allergies and immunocompromised state.   Neurological:  Negative for dizziness, tremors, seizures, syncope, facial asymmetry, speech difficulty, weakness, light-headedness, numbness and headaches.   Hematological:  Negative for adenopathy. Does not bruise/bleed easily.   Psychiatric/Behavioral:  Negative for confusion, decreased concentration, hallucinations, self-injury, sleep disturbance and suicidal ideas. The patient is not nervous/anxious.      Past Medical History:   Diagnosis Date    Colon tumor     Hyperlipidemia     Hypertension     Hypokalemia 3/1/2018    Multiple pulmonary nodules 8/10/2018      Past Surgical History:   Procedure Laterality Date    COLON SURGERY  03/07/2018    COLONOSCOPY      FINGER SURGERY      RIGHT COLECTOMY Right 03/07/2018           Family History   Problem Relation Age of Onset    Diabetes Mother     Heart disease Mother     Diabetes Sister     Breast cancer Sister     Diabetes Maternal Aunt     Diabetes Maternal Grandmother     Heart disease Maternal Grandmother     Heart disease Maternal Grandfather        Social History     Socioeconomic History    Marital status: Single   Occupational History    Occupation: shipping and recieving   Tobacco Use    Smoking status: Former     "Smokeless tobacco: Never   Substance and Sexual Activity    Alcohol use: Yes     Comment: occ    Drug use: No    Sexual activity: Yes     Partners: Female   Social History Narrative    Lives alone       Current Outpatient Medications   Medication Sig Dispense Refill    ascorbic acid, vitamin C, (VITAMIN C) 1000 MG tablet Take 1,000 mg by mouth once daily.      aspirin (ECOTRIN) 81 MG EC tablet Take 81 mg by mouth.      azilsartan med-chlorthalidone (EDARBYCLOR) 40-25 mg Tab Take 1 tablet by mouth once daily. 90 tablet 1    magnesium 30 mg Tab Take 1 tablet by mouth once.      zinc 50 mg Tab Take by mouth.      amLODIPine (NORVASC) 10 MG tablet Take 1 tablet (10 mg total) by mouth once daily. 90 tablet 1    atorvastatin (LIPITOR) 40 MG tablet Take 1 tablet (40 mg total) by mouth once daily. Take one tablet daily. 90 tablet 1    labetaloL (NORMODYNE) 200 MG tablet Take 1 tablet (200 mg total) by mouth 2 (two) times daily. 180 tablet 1    meclizine (ANTIVERT) 25 mg tablet Take 1 tablet (25 mg total) by mouth 3 (three) times daily as needed. 30 tablet 1    potassium chloride SA (K-DUR,KLOR-CON) 20 MEQ tablet Take 1 tablet (20 mEq total) by mouth 3 (three) times daily. 270 tablet 1     No current facility-administered medications for this visit.       Review of patient's allergies indicates:  No Known Allergies  Objective:    HPI     Hypertension     Additional comments: 5 months follow up          Last edited by Jose R Willis MA on 6/21/2023  3:46 PM.      Blood pressure (P) 134/60, pulse 80, temperature 98 °F (36.7 °C), temperature source Temporal, height 5' 8" (1.727 m), weight 111.1 kg (245 lb), SpO2 95 %. Body mass index is 37.25 kg/m².   Physical Exam  Vitals and nursing note reviewed.   Constitutional:       General: He is not in acute distress.     Appearance: He is well-developed. He is obese. He is not ill-appearing, toxic-appearing or diaphoretic.   HENT:      Head: Normocephalic and atraumatic.   Eyes:      " General: No scleral icterus.        Right eye: No discharge.         Left eye: No discharge.      Conjunctiva/sclera: Conjunctivae normal.   Neck:      Vascular: No carotid bruit.   Cardiovascular:      Rate and Rhythm: Normal rate and regular rhythm.      Heart sounds: Normal heart sounds. No murmur heard.  Pulmonary:      Effort: Pulmonary effort is normal. No respiratory distress.      Breath sounds: Normal breath sounds. No decreased breath sounds, wheezing, rhonchi or rales.   Abdominal:      General: There is no distension.      Palpations: Abdomen is soft.      Tenderness: There is no abdominal tenderness. There is no guarding or rebound.   Musculoskeletal:      Right lower leg: No edema.      Left lower leg: No edema.   Skin:     General: Skin is warm and dry.   Neurological:      Mental Status: He is alert.      Motor: No tremor.   Psychiatric:         Mood and Affect: Mood normal.         Speech: Speech normal.         Behavior: Behavior normal.           Assessment:       1. Essential hypertension    2. Hypokalemia    3. Vertigo    4. Pure hypercholesterolemia        Plan:       Vince was seen today for hypertension.    Diagnoses and all orders for this visit:    Essential hypertension  -     amLODIPine (NORVASC) 10 MG tablet; Take 1 tablet (10 mg total) by mouth once daily.  -     labetaloL (NORMODYNE) 200 MG tablet; Take 1 tablet (200 mg total) by mouth 2 (two) times daily.    Hypokalemia  Comments:  Remains low despite high doses of potassium but I am leery of increasing dose further  Orders:  -     potassium chloride SA (K-DUR,KLOR-CON) 20 MEQ tablet; Take 1 tablet (20 mEq total) by mouth 3 (three) times daily.    Vertigo  Comments:  Continue mecilizine as needed.  Usually self limited.   Orders:  -     meclizine (ANTIVERT) 25 mg tablet; Take 1 tablet (25 mg total) by mouth 3 (three) times daily as needed.    Pure hypercholesterolemia  Comments:  Recheck lipids with next labs  Orders:  -      atorvastatin (LIPITOR) 40 MG tablet; Take 1 tablet (40 mg total) by mouth once daily. Take one tablet daily.    Other orders  The following orders have not been finalized:  -     Cancel: azilsartan med-chlorthalidone (EDARBYCLOR) 40-25 mg Tab

## 2023-07-10 ENCOUNTER — LAB VISIT (OUTPATIENT)
Dept: LAB | Facility: HOSPITAL | Age: 58
End: 2023-07-10
Attending: INTERNAL MEDICINE
Payer: COMMERCIAL

## 2023-07-10 DIAGNOSIS — E87.6 HYPOKALEMIA: ICD-10-CM

## 2023-07-10 DIAGNOSIS — E78.00 PURE HYPERCHOLESTEROLEMIA: ICD-10-CM

## 2023-07-10 DIAGNOSIS — I10 ESSENTIAL HYPERTENSION: ICD-10-CM

## 2023-07-10 LAB
ALBUMIN SERPL BCP-MCNC: 4.2 G/DL (ref 3.5–5.2)
ALP SERPL-CCNC: 62 U/L (ref 55–135)
ALT SERPL W/O P-5'-P-CCNC: 26 U/L (ref 10–44)
ANION GAP SERPL CALC-SCNC: 7 MMOL/L (ref 8–16)
AST SERPL-CCNC: 25 U/L (ref 10–40)
BILIRUB SERPL-MCNC: 1.3 MG/DL (ref 0.1–1)
BILIRUB UR QL STRIP: NEGATIVE
BUN SERPL-MCNC: 15 MG/DL (ref 6–20)
CALCIUM SERPL-MCNC: 9.2 MG/DL (ref 8.7–10.5)
CHLORIDE SERPL-SCNC: 99 MMOL/L (ref 95–110)
CHOLEST SERPL-MCNC: 187 MG/DL (ref 120–199)
CHOLEST/HDLC SERPL: 3.2 {RATIO} (ref 2–5)
CLARITY UR: CLEAR
CO2 SERPL-SCNC: 30 MMOL/L (ref 23–29)
COLOR UR: YELLOW
CREAT SERPL-MCNC: 1.4 MG/DL (ref 0.5–1.4)
EST. GFR  (NO RACE VARIABLE): 57.9 ML/MIN/1.73 M^2
GLUCOSE SERPL-MCNC: 100 MG/DL (ref 70–110)
GLUCOSE UR QL STRIP: NEGATIVE
HDLC SERPL-MCNC: 58 MG/DL (ref 40–75)
HDLC SERPL: 31 % (ref 20–50)
HGB UR QL STRIP: NEGATIVE
KETONES UR QL STRIP: NEGATIVE
LDLC SERPL CALC-MCNC: 108.6 MG/DL (ref 63–159)
LEUKOCYTE ESTERASE UR QL STRIP: NEGATIVE
NITRITE UR QL STRIP: NEGATIVE
NONHDLC SERPL-MCNC: 129 MG/DL
PH UR STRIP: 8 [PH] (ref 5–8)
POTASSIUM SERPL-SCNC: 3 MMOL/L (ref 3.5–5.1)
PROT SERPL-MCNC: 7.6 G/DL (ref 6–8.4)
PROT UR QL STRIP: ABNORMAL
SODIUM SERPL-SCNC: 136 MMOL/L (ref 136–145)
SP GR UR STRIP: 1.02 (ref 1–1.03)
TRIGL SERPL-MCNC: 102 MG/DL (ref 30–150)
URN SPEC COLLECT METH UR: ABNORMAL
UROBILINOGEN UR STRIP-ACNC: NEGATIVE EU/DL

## 2023-07-10 PROCEDURE — 36415 COLL VENOUS BLD VENIPUNCTURE: CPT | Performed by: INTERNAL MEDICINE

## 2023-07-10 PROCEDURE — 81003 URINALYSIS AUTO W/O SCOPE: CPT | Performed by: INTERNAL MEDICINE

## 2023-07-10 PROCEDURE — 80061 LIPID PANEL: CPT | Performed by: INTERNAL MEDICINE

## 2023-07-10 PROCEDURE — 80053 COMPREHEN METABOLIC PANEL: CPT | Performed by: INTERNAL MEDICINE

## 2023-07-13 ENCOUNTER — TELEPHONE (OUTPATIENT)
Dept: HEMATOLOGY/ONCOLOGY | Facility: CLINIC | Age: 58
End: 2023-07-13

## 2023-07-13 DIAGNOSIS — C18.2 PRIMARY ADENOCARCINOMA OF ASCENDING COLON: Primary | ICD-10-CM

## 2023-07-18 ENCOUNTER — LAB VISIT (OUTPATIENT)
Dept: LAB | Facility: HOSPITAL | Age: 58
End: 2023-07-18
Attending: INTERNAL MEDICINE
Payer: COMMERCIAL

## 2023-07-18 DIAGNOSIS — C18.2 PRIMARY ADENOCARCINOMA OF ASCENDING COLON: ICD-10-CM

## 2023-07-18 LAB
ALBUMIN SERPL BCP-MCNC: 4.2 G/DL (ref 3.5–5.2)
ALP SERPL-CCNC: 59 U/L (ref 55–135)
ALT SERPL W/O P-5'-P-CCNC: 23 U/L (ref 10–44)
ANION GAP SERPL CALC-SCNC: 8 MMOL/L (ref 8–16)
AST SERPL-CCNC: 24 U/L (ref 10–40)
BASOPHILS # BLD AUTO: 0.01 K/UL (ref 0–0.2)
BASOPHILS NFR BLD: 0.2 % (ref 0–1.9)
BILIRUB SERPL-MCNC: 1.4 MG/DL (ref 0.1–1)
BUN SERPL-MCNC: 19 MG/DL (ref 6–20)
CALCIUM SERPL-MCNC: 9.2 MG/DL (ref 8.7–10.5)
CEA SERPL-MCNC: 1.9 NG/ML (ref 0–5)
CHLORIDE SERPL-SCNC: 103 MMOL/L (ref 95–110)
CO2 SERPL-SCNC: 29 MMOL/L (ref 23–29)
CREAT SERPL-MCNC: 1.4 MG/DL (ref 0.5–1.4)
DIFFERENTIAL METHOD: ABNORMAL
EOSINOPHIL # BLD AUTO: 0.1 K/UL (ref 0–0.5)
EOSINOPHIL NFR BLD: 1.3 % (ref 0–8)
ERYTHROCYTE [DISTWIDTH] IN BLOOD BY AUTOMATED COUNT: 13.2 % (ref 11.5–14.5)
EST. GFR  (NO RACE VARIABLE): 57.9 ML/MIN/1.73 M^2
GLUCOSE SERPL-MCNC: 98 MG/DL (ref 70–110)
HCT VFR BLD AUTO: 43.3 % (ref 40–54)
HGB BLD-MCNC: 14.5 G/DL (ref 14–18)
IMM GRANULOCYTES # BLD AUTO: 0.01 K/UL (ref 0–0.04)
IMM GRANULOCYTES NFR BLD AUTO: 0.2 % (ref 0–0.5)
LYMPHOCYTES # BLD AUTO: 1.9 K/UL (ref 1–4.8)
LYMPHOCYTES NFR BLD: 34.5 % (ref 18–48)
MCH RBC QN AUTO: 27.8 PG (ref 27–31)
MCHC RBC AUTO-ENTMCNC: 33.5 G/DL (ref 32–36)
MCV RBC AUTO: 83 FL (ref 82–98)
MONOCYTES # BLD AUTO: 0.6 K/UL (ref 0.3–1)
MONOCYTES NFR BLD: 10.2 % (ref 4–15)
NEUTROPHILS # BLD AUTO: 3 K/UL (ref 1.8–7.7)
NEUTROPHILS NFR BLD: 53.6 % (ref 38–73)
NRBC BLD-RTO: 0 /100 WBC
PLATELET # BLD AUTO: 238 K/UL (ref 150–450)
PMV BLD AUTO: 8.9 FL (ref 9.2–12.9)
POTASSIUM SERPL-SCNC: 3 MMOL/L (ref 3.5–5.1)
PROT SERPL-MCNC: 7.3 G/DL (ref 6–8.4)
RBC # BLD AUTO: 5.21 M/UL (ref 4.6–6.2)
SODIUM SERPL-SCNC: 140 MMOL/L (ref 136–145)
WBC # BLD AUTO: 5.6 K/UL (ref 3.9–12.7)

## 2023-07-18 PROCEDURE — 80053 COMPREHEN METABOLIC PANEL: CPT | Performed by: INTERNAL MEDICINE

## 2023-07-18 PROCEDURE — 82378 CARCINOEMBRYONIC ANTIGEN: CPT | Performed by: INTERNAL MEDICINE

## 2023-07-18 PROCEDURE — 36415 COLL VENOUS BLD VENIPUNCTURE: CPT | Performed by: INTERNAL MEDICINE

## 2023-07-18 PROCEDURE — 85025 COMPLETE CBC W/AUTO DIFF WBC: CPT | Performed by: INTERNAL MEDICINE

## 2023-07-19 NOTE — PROGRESS NOTES
[facility] Hematology/Oncology  PROGRESS NOTE - Follow-up Visit       Subjective:       Patient ID:   NAME: Vince Kwon : 1965     57 y.o. male    Referring Doc: Ugo Johnson  Other Physicians: Natalya Gutierrez    Chief Complaint:  Colon ca f/u    History of Present Illness:     Patient is being seen today in person for a regularly scheduled visit; The patient is here by himself.     He is doing ok with no new issues.  Bowel movements have been normal. He denies any CP, SOB, HA's or N/V.     He last saw Dr Hoang on 2023    He has been seeing Dr Johnson and was suppose to have repeat scope in 2023  but did not have it done due to insurnace         Discussed covid19 precautions - he has not been vaccinated but had covid in 2022 and required an Ig infusion         .         ROS:   GEN: normal without any fever, night sweats or weight loss  HEENT: normal with no HA's, sore throat, stiff neck, changes in vision  CV: normal with no CP, SOB, PND, LIGHT or orthopnea  PULM: normal with no SOB, cough, hemoptysis, sputum or pleuritic pain  GI: normal with no abdominal pain, nausea, vomiting, constipation, diarrhea, melanotic stools, BRBPR, or hematemesis  : normal with no hematuria, dysuria  BREAST: normal with no mass, discharge, pain  SKIN: normal with no rash, erythema, bruising, or swelling    Allergies:  Review of patient's allergies indicates:  No Known Allergies    Medications:    Current Outpatient Medications:     amLODIPine (NORVASC) 10 MG tablet, Take 1 tablet (10 mg total) by mouth once daily., Disp: 90 tablet, Rfl: 1    ascorbic acid, vitamin C, (VITAMIN C) 1000 MG tablet, Take 1,000 mg by mouth once daily., Disp: , Rfl:     aspirin (ECOTRIN) 81 MG EC tablet, Take 81 mg by mouth., Disp: , Rfl:     atorvastatin (LIPITOR) 40 MG tablet, Take 1 tablet (40 mg total) by mouth once daily. Take one tablet daily., Disp: 90 tablet, Rfl: 1    azilsartan med-chlorthalidone (EDARBYCLOR)  40-25 mg Tab, Take 1 tablet by mouth once daily., Disp: 90 tablet, Rfl: 1    labetaloL (NORMODYNE) 200 MG tablet, Take 1 tablet (200 mg total) by mouth 2 (two) times daily., Disp: 180 tablet, Rfl: 1    magnesium 30 mg Tab, Take 1 tablet by mouth once., Disp: , Rfl:     meclizine (ANTIVERT) 25 mg tablet, Take 1 tablet (25 mg total) by mouth 3 (three) times daily as needed., Disp: 30 tablet, Rfl: 1    potassium chloride SA (K-DUR,KLOR-CON) 20 MEQ tablet, Take 1 tablet (20 mEq total) by mouth 3 (three) times daily., Disp: 270 tablet, Rfl: 1    zinc 50 mg Tab, Take by mouth., Disp: , Rfl:     PMHx/PSHx Updates:  See patient's last visit with me on 12/13/2022  See H&P on 2/20/2018        Pathology:    3/7/2018:    FINAL DIAGNOSIS:  RIGHT COLON, HEMICOLECTOMY:    ULCERATED MODERATE TO MODERATELY WELL 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    THE TUMOR PENETRATES THE MUSCULARIS PROPRIA AND INVADES THE  PERICOLONIC FAT.    THE PROXIMAL (ILEAL) AND DISTAL (COLONIC) SURGICAL MARGINS OF  RESECTION ARE FREE OF    MALIGNANCY.    A TOTAL OF TWENTY-SEVEN (27) PERICOLONIC LYMPH NODES ARE FREE OF  METASTATIC TUMOR.    APPENDIX: REACTIVE MUCOSAL LYMPHOID HYPERPLASIA.     NO EVIDENCE OF METASTATIC CARCINOMA.    Procedure:  Right hemicolectomy  Specimen Length (if applicable):  <CR-*Specimen Length (if applicable):     12.0 cm>  Tumor Site:  Cecum  Tumor Location  Tumor Size:  Greatest dimension:  4.5 cm  Macroscopic Tumor Perforation:  Not identified  Macroscopic Intactness of Mesorectum:  Histologic Type:  Adenocarcinoma, focal mucinous (colloid) carcinoma  component.  Histologic Grade:  Low grade (well differentiated to moderately  differentiated)  Histologic Features Suggestive of Microsatellite Instability:  Intratumoral Lymphocytic Response (tumor-infiltrating lymphocytes):  Peritumor Lymphocytic Response (Crohn-like  "response):  Tumor Subtype and Differentiation:  Microscopic Tumor Extension:  Tumor invades through the muscularis  propria into the subserosal adipose tissue or the nonperitonealized  pericolic or perirectal soft tissues but does not extend to the serosal  surface  Margins    TNM Descriptors:  Primary Tumor (pT):  pT3:  Tumor invades through the muscularis propria  into pericolorectal tissues  Regional Lymph Nodes (pN):  pN0:  No regional lymph node metastasis  Distant Metastasis:  Not applicable.      Objective:     Vitals:  Blood pressure (!) 147/69, pulse 82, temperature 98.1 °F (36.7 °C), resp. rate 18, height 5' 8" (1.727 m), weight 110.6 kg (243 lb 12.8 oz).        Physical Examination:   GEN: no apparent distress, comfortable; AAOx3; overweight  HEAD: atraumatic and normocephalic  EYES: no conjunctival pallor or muddiness, no icterus; normal pupil reaction to ambient light  ENT: OMM, no pharyngeal erythema, external bilateral ears WNL; no visible thrush or ulcers  NECK: no masses or swelling, trachea midline, no visible LAD/LN's   CV: no palpitations; no pedal edema; no noticeable JVD or neck vein distension  CHEST: Normal respiratory effort; chest wall breath movements symmetrical; no audible wheezing  ABDOM: non-distended; no bloating  MUSC/Skeletal: ROM normal; joints visibly normal; no deformities or arthropathy  EXTREM: no clubbing, cyanosis, inflammation or swelling  SKIN: no rashes, lesions, ulcers, petechiae or subcutaneous nodules  : no escamilla  NEURO: moving all 4 extremities; AAOx3; no tremors  PSYCH: normal mood, affect and behavior  LYMPH: no visible LN's or LAD              Labs:        Lab Results   Component Value Date    WBC 5.60 07/18/2023    HGB 14.5 07/18/2023    HCT 43.3 07/18/2023    MCV 83 07/18/2023     07/18/2023     BMP  Lab Results   Component Value Date     07/18/2023    K 3.0 (L) 07/18/2023     07/18/2023    CO2 29 07/18/2023    BUN 19 07/18/2023    CREATININE " 1.4 07/18/2023    CALCIUM 9.2 07/18/2023    ANIONGAP 8 07/18/2023    ESTGFRAFRICA >60.0 10/15/2021    EGFRNONAA >60.0 10/15/2021     Lab Results   Component Value Date    ALT 23 07/18/2023    AST 24 07/18/2023    ALKPHOS 59 07/18/2023    BILITOT 1.4 (H) 07/18/2023     Lab Results   Component Value Date    CEA 1.9 07/18/2023           Radiology/Diagnostic Studies:    Ct Scans 12/9/2022:    1. Right pulmonary nodule is unchanged.  2. No adenopathy. There are a few nonenlarged lymph nodes in the central abdomen.         CT 12/3/2021:  IMPRESSION:     No findings of metastatic disease or significant detrimental interval change.     Small noncalcified right-sided pulmonary nodules.     Additional observations as above.         CT scans  10/9/2020:    IMPRESSION:     1.  No acute intra-abdominal abnormalities identified.  2.  Small focal groundglass nodular-like density in the right  lateral ventricle lobes unchanged.  3.  Mild bilateral dependent subsegmental atelectasis.        CT chest scan  3/27/2020:    Impression       Stable soft tissue attenuation noncalcified pulmonary nodules with no new concerning pulmonary nodule or mass identified.  No findings to specifically suggest metastatic disease.           CT scans  9/20/2019:    Impression       No evidence of recurrence or metastatic disease    Stable tiny noncalcified nodules in the right lung           Ct scan 2/1/2019:    IMPRESSION:  1. Interval development of several prominent to borderline enlarged lymph nodes  within the central mesentery and right lower quadrant, nonspecific. These are  suspicious for metastatic disease, given lymphadenopathy seen on the patient's  original preoperative CT of 02/23/2018. Correlation with serum tumor markers,  and consideration for further evaluation with PET CT, are recommended.  2. No additional evidence for metastatic disease in the abdomen or the pelvis.          CT scans 7/27/2018:    IMPRESSION:    1. Stable appearance  of subcentimeter right middle lobe and right upper lobe  pulmonary nodules.  2. Status post partial colonic resection with expected postoperative changes,  as discussed above. No residual mass or pathologically enlarged lymph nodes in  the abdomen or pelvis.  3. Incidental findings as above.            PET/CT: 4/11/2018    IMPRESSION:    1. Persistent mild wall thickening centered about site of ileocolonic  anastomosis with associated FDG uptake is most likely postoperative in nature  and due to residual inflammation. Residual malignancy cannot be excluded on the  basis of imaging alone, and correlation with surgical resection margins is  requested.    2. No current convincing evidence of metastatic disease.    3. 3 mm nodular density which is vague in superior right middle lobe is nonspecific. CT thorax without IV contrast follow-up is recommended within 3-6  months to simply document stability.          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 Comparison: None Findings: Cardiac silhouette size is normal. Lungs are clear without effusion. No pneumothorax. No acute osseous abnormality. IMPRESSION: No acute pulmonary process. Read and electronically signed by: Shubham Marino MD on 2/28/2018 6:22 PM CST SHUBHAM MARINO MD    Ct Abdomen Pelvis With Contrast    Result Date: 2/23/2018  CMS MANDATED QUALITY DATA - CT RADIATION ? 436 All CT scans at this facility utilize dose modulation, iterative reconstruction, and/or weight based dosing when appropriate to reduce radiation dose to as low as reasonably achievable. CLINICAL HISTORY: 52 years (1965) Male K63.9 TECHNIQUE: MDI ABDOMEN AND PELVIS W  CONTRAST CT. 296 images obtained. Axial CT images of the abdomen and pelvis were obtained from the dome of the diaphragm to the proximal thigh. CONTRAST: 100 mL of IV Omni 350 was administered uneventfully  by IV. Oral contrast was also administered COMPARISON: None available. FINDINGS: Lower Thorax: The lung bases are clear. The heart is normal in size and there is no pericardial effusion. CT Abdomen: Liver: The liver is normal size and imaging appearance. Gallbladder: The gallbladder is unremarkable without acute cholecystitis. Biliary Tree: There is no intra or extrahepatic duct dilation. Spleen: The spleen is normal. Pancreas: The pancreas is normal. Adrenal Glands: The adrenal glands appear within normal limits. Kidneys: The kidneys are normal in imaging appearance without hydronephrosis or hydroureter. Vasculature: The aorta is normal in course and caliber. Lymph nodes: No abdominal lymphadenopathy is seen by size criteria. Intraperitoneal structures: There is no ascites. Bowel:  There is an apple core like lesion in the ascending colon extending over a length of approximately 5 cm (10 o'clock-8 o'clock position involving a majority of the circumference) the soft tissue mass measures approximately 2 cm in thickness. There are numerous small rounded lymph nodes in the cecal mesentery, none of which are enlarged by size criteria, however given the morphology and location these may be involved, for example a 15 x 7 mm node (image 121). Abdominal wall: The abdominal wall and musculature are normal. Musculoskeletal: There is a transitional lumbosacral vertebral body (S1) with a hypoplastic disc at S1-S2. There is moderate to severe disc height loss at L4-L5. No aggressive appearing lytic or blastic lesion is identified. CT Pelvis: Bladder: The urinary bladder is within normal limits. Reproductive Organs: The prostate and seminal vesicles are within normal limits. Pelvic Lymph nodes: No pelvic lymphadenopathy or pelvic mass is identified. IMPRESSION: 1. Apple-core like lesion in the descending colon most consistent with a primary colonic adenocarcinoma, there is no evidence of obstruction, pneumatosis intra-abdominal  "free air or abscess. 2. No lymphadenopathy by size criteria and no finding to specifically suggest metastatic disease in the abdomen or pelvis. Read and electronically signed by: Singh Colvin MD on 2/23/2018 9:53 AM CST SINGH COLVIN MD      I have reviewed all available lab results and radiology reports.    Assessment/Plan:   (1) 57y.o. male with diagnosis of right colon mass found on recent colonoscopy on 2/16/2018 with Dr Johnson.  - he had been seen by Dr Gutierrez with [facility]   - s/p right colectomy with Dr Gutierrez on 3/7/2018  - 27 LN's were all negative  - T3 N0 and Stage IIA  - low grade adenocarcinoma  - PET scan on 4/11/2018 with no definitive evidence of ant metastatic process  - repeat CEA was 2.1  - will most likely not need chemotherapy if he remains a Stage II  - MMR was negative but all of all JESSICA was "stable" which has been shown to illicit a poorer prognosis in general but would not changes the current plan of therapy (discussed with the patient in detail)  - prior CT scans on 7/27/2018 which appeared to be stable; however, repeat CT on 2/1/2019 showed some increase in LN's which needed to be addressed with PET scans but his insurance declined   - latest CT's on 9/20/2019 with no evidence of recurrent cancer  - last colonoscopy was good per patient with another one planned for 3 yrs      10/12/2020:  - latest CT scans on 10/9/2020 appear to be stable  - CEA level good at 1.4  - no new bowel issues  - repeat scope in about 2 yrs per patient    4/29/2021:  - he had recent labs done with Dr Driver but did not have CEA done  - he was supposed to get repeat scans this month but has not done so    10/28/2021:  - insurance denied approval for his PET  - will look into getting CT scans instead  - CEA was 1.7  - he sees GI again in March 2022    5/10/2022:  - He last saw Dr Hoang on  3/18/2022   - he is seeing Dr Johnson again in June 2022 for repeat scopes  -  He last had CT scans on " 12/3/2021  - CEA was at 1.6    12/13/2022:  - recent CT scans on 12/9 appear to be relatively stable  - CEA was 2.0  - he was supposed to get repeat scope in June 2022 but did not due to insurance  - asked patient to reach out to GI again     7/20/2023:  - he was suppose to get repeat scope in April 2023 but did not do so due to insurance declining authorization per patient - will reach out to Dr johnson to see what is the issue  - CEA was good 1.9  - no significant anemia issues  - repeat scans in Dec 2023           (2) HTN and hypercholesterolemia     (3) Prior mini-CVA - HTN related; no subsequent deficits; sounds like he had a TIA    (4) RML lung nodule on PEt at 3mm with no FDG uptake; CT stable;  - he last saw Dr Crockett with pulmonary on 4/2/2019  - latest Ct was stable on 9/20/2019 and again on 3/27/2020    1. Primary adenocarcinoma of ascending colon              PLAN:  1. Repeat scans again in Dec 2023;  reach out to Dr Johnson again for setting up repeat scope  2. F/u with GI, PCP, [facility], pulm etc  3. F/u with GI   4. Encouraged compliance with f/u with Pulm, etc  5. Check up to date labs incl CEA every 6 months  6. RTC 6 months    Fax note to Jose Johnson Jr, MD, [facility]    Discussion:       Total Time spent on patient:    I spent over 25 mins of time with the patient. Reviewed results of the recently ordered labs, tests, reports and studies; made directives with regards to the results. Over half of this time was spent couseling and coordinating care, making treatment and analytical decisions; ordering necessary labs, tests and studies; and discussing treatment options and setting up treatment plan(s) if indicated.    COVID-19 Discussion:    I had long discussion with patient and any applicable family about the COVID-19 coronavirus epidemic and the recommended precautions with regard to cancer and/or hematology patients. I have re-iterated the CDC recommendations  for adequate hand washing, use of hand -like products, and coughing into elbow, etc. In addition, especially for our patients who are on chemotherapy and/or our otherwise immunocompromised patients, I have recommended avoidance of crowds, including movie theaters, restaurants, churches, etc. I have recommended avoidance of any sick or symptomatic family members and/or friends. I have also recommended avoidance of any raw and unwashed food products, and general avoidance of food items that have not been prepared by themselves. The patient has been asked to call us immediately with any symptom developments, issues, questions or other general concerns.            I have explained all of the above in detail and the patient understands all of the current recommendation(s). I have answered all of their questions to the best of my ability and to their complete satisfaction.   The patient is to continue with the current management plan.            Electronically signed by Carlo Welch MD

## 2023-07-20 ENCOUNTER — OFFICE VISIT (OUTPATIENT)
Dept: HEMATOLOGY/ONCOLOGY | Facility: CLINIC | Age: 58
End: 2023-07-20
Payer: COMMERCIAL

## 2023-07-20 VITALS
TEMPERATURE: 98 F | RESPIRATION RATE: 18 BRPM | BODY MASS INDEX: 36.95 KG/M2 | WEIGHT: 243.81 LBS | HEART RATE: 82 BPM | SYSTOLIC BLOOD PRESSURE: 147 MMHG | DIASTOLIC BLOOD PRESSURE: 69 MMHG | HEIGHT: 68 IN

## 2023-07-20 DIAGNOSIS — C18.2 PRIMARY ADENOCARCINOMA OF ASCENDING COLON: Primary | ICD-10-CM

## 2023-07-20 PROCEDURE — 99214 PR OFFICE/OUTPT VISIT, EST, LEVL IV, 30-39 MIN: ICD-10-PCS | Mod: S$GLB,,, | Performed by: INTERNAL MEDICINE

## 2023-07-20 PROCEDURE — 3077F SYST BP >= 140 MM HG: CPT | Mod: CPTII,S$GLB,, | Performed by: INTERNAL MEDICINE

## 2023-07-20 PROCEDURE — 3078F DIAST BP <80 MM HG: CPT | Mod: CPTII,S$GLB,, | Performed by: INTERNAL MEDICINE

## 2023-07-20 PROCEDURE — 99214 OFFICE O/P EST MOD 30 MIN: CPT | Mod: S$GLB,,, | Performed by: INTERNAL MEDICINE

## 2023-07-20 PROCEDURE — 1160F RVW MEDS BY RX/DR IN RCRD: CPT | Mod: CPTII,S$GLB,, | Performed by: INTERNAL MEDICINE

## 2023-07-20 PROCEDURE — 3077F PR MOST RECENT SYSTOLIC BLOOD PRESSURE >= 140 MM HG: ICD-10-PCS | Mod: CPTII,S$GLB,, | Performed by: INTERNAL MEDICINE

## 2023-07-20 PROCEDURE — 1160F PR REVIEW ALL MEDS BY PRESCRIBER/CLIN PHARMACIST DOCUMENTED: ICD-10-PCS | Mod: CPTII,S$GLB,, | Performed by: INTERNAL MEDICINE

## 2023-07-20 PROCEDURE — 1159F PR MEDICATION LIST DOCUMENTED IN MEDICAL RECORD: ICD-10-PCS | Mod: CPTII,S$GLB,, | Performed by: INTERNAL MEDICINE

## 2023-07-20 PROCEDURE — 3008F PR BODY MASS INDEX (BMI) DOCUMENTED: ICD-10-PCS | Mod: CPTII,S$GLB,, | Performed by: INTERNAL MEDICINE

## 2023-07-20 PROCEDURE — 3078F PR MOST RECENT DIASTOLIC BLOOD PRESSURE < 80 MM HG: ICD-10-PCS | Mod: CPTII,S$GLB,, | Performed by: INTERNAL MEDICINE

## 2023-07-20 PROCEDURE — 1159F MED LIST DOCD IN RCRD: CPT | Mod: CPTII,S$GLB,, | Performed by: INTERNAL MEDICINE

## 2023-07-20 PROCEDURE — 3008F BODY MASS INDEX DOCD: CPT | Mod: CPTII,S$GLB,, | Performed by: INTERNAL MEDICINE

## 2023-12-08 ENCOUNTER — TELEPHONE (OUTPATIENT)
Dept: HEMATOLOGY/ONCOLOGY | Facility: CLINIC | Age: 58
End: 2023-12-08

## 2023-12-08 DIAGNOSIS — C18.2 PRIMARY ADENOCARCINOMA OF ASCENDING COLON: Primary | ICD-10-CM

## 2023-12-08 NOTE — TELEPHONE ENCOUNTER
PET denied, per Dr. Welch's orders to do so, I placed orders for CT chest/abd/pelvis. Kathy made aware of need for auth.

## 2023-12-19 ENCOUNTER — OFFICE VISIT (OUTPATIENT)
Dept: FAMILY MEDICINE | Facility: CLINIC | Age: 58
End: 2023-12-19
Payer: COMMERCIAL

## 2023-12-19 VITALS
BODY MASS INDEX: 36.89 KG/M2 | TEMPERATURE: 97 F | SYSTOLIC BLOOD PRESSURE: 138 MMHG | DIASTOLIC BLOOD PRESSURE: 80 MMHG | OXYGEN SATURATION: 95 % | HEART RATE: 60 BPM | HEIGHT: 68 IN | WEIGHT: 243.38 LBS

## 2023-12-19 DIAGNOSIS — I10 ESSENTIAL HYPERTENSION: Primary | ICD-10-CM

## 2023-12-19 DIAGNOSIS — E78.00 PURE HYPERCHOLESTEROLEMIA: ICD-10-CM

## 2023-12-19 DIAGNOSIS — E87.6 HYPOKALEMIA: ICD-10-CM

## 2023-12-19 PROCEDURE — 1159F MED LIST DOCD IN RCRD: CPT | Mod: CPTII,S$GLB,, | Performed by: INTERNAL MEDICINE

## 2023-12-19 PROCEDURE — 3008F BODY MASS INDEX DOCD: CPT | Mod: CPTII,S$GLB,, | Performed by: INTERNAL MEDICINE

## 2023-12-19 PROCEDURE — 99213 PR OFFICE/OUTPT VISIT, EST, LEVL III, 20-29 MIN: ICD-10-PCS | Mod: S$GLB,,, | Performed by: INTERNAL MEDICINE

## 2023-12-19 PROCEDURE — 3008F PR BODY MASS INDEX (BMI) DOCUMENTED: ICD-10-PCS | Mod: CPTII,S$GLB,, | Performed by: INTERNAL MEDICINE

## 2023-12-19 PROCEDURE — 3079F PR MOST RECENT DIASTOLIC BLOOD PRESSURE 80-89 MM HG: ICD-10-PCS | Mod: CPTII,S$GLB,, | Performed by: INTERNAL MEDICINE

## 2023-12-19 PROCEDURE — 3075F SYST BP GE 130 - 139MM HG: CPT | Mod: CPTII,S$GLB,, | Performed by: INTERNAL MEDICINE

## 2023-12-19 PROCEDURE — 99213 OFFICE O/P EST LOW 20 MIN: CPT | Mod: S$GLB,,, | Performed by: INTERNAL MEDICINE

## 2023-12-19 PROCEDURE — 1159F PR MEDICATION LIST DOCUMENTED IN MEDICAL RECORD: ICD-10-PCS | Mod: CPTII,S$GLB,, | Performed by: INTERNAL MEDICINE

## 2023-12-19 PROCEDURE — 3079F DIAST BP 80-89 MM HG: CPT | Mod: CPTII,S$GLB,, | Performed by: INTERNAL MEDICINE

## 2023-12-19 PROCEDURE — 99999 PR PBB SHADOW E&M-EST. PATIENT-LVL III: CPT | Mod: PBBFAC,,, | Performed by: INTERNAL MEDICINE

## 2023-12-19 PROCEDURE — 99999 PR PBB SHADOW E&M-EST. PATIENT-LVL III: ICD-10-PCS | Mod: PBBFAC,,, | Performed by: INTERNAL MEDICINE

## 2023-12-19 PROCEDURE — 3075F PR MOST RECENT SYSTOLIC BLOOD PRESS GE 130-139MM HG: ICD-10-PCS | Mod: CPTII,S$GLB,, | Performed by: INTERNAL MEDICINE

## 2023-12-19 RX ORDER — AMLODIPINE BESYLATE 10 MG/1
10 TABLET ORAL DAILY
Qty: 90 TABLET | Refills: 1 | Status: SHIPPED | OUTPATIENT
Start: 2023-12-19 | End: 2024-01-18 | Stop reason: SDUPTHER

## 2023-12-19 RX ORDER — LABETALOL 200 MG/1
200 TABLET, FILM COATED ORAL 2 TIMES DAILY
Qty: 180 TABLET | Refills: 1 | Status: SHIPPED | OUTPATIENT
Start: 2023-12-19

## 2023-12-19 RX ORDER — MECLIZINE HYDROCHLORIDE 25 MG/1
25 TABLET ORAL 3 TIMES DAILY PRN
Qty: 30 TABLET | Refills: 1 | Status: CANCELLED | OUTPATIENT
Start: 2023-12-19

## 2023-12-19 RX ORDER — ATORVASTATIN CALCIUM 40 MG/1
40 TABLET, FILM COATED ORAL DAILY
Qty: 90 TABLET | Refills: 1 | Status: SHIPPED | OUTPATIENT
Start: 2023-12-19 | End: 2024-01-18 | Stop reason: SDUPTHER

## 2023-12-19 RX ORDER — POTASSIUM CHLORIDE 20 MEQ/1
20 TABLET, EXTENDED RELEASE ORAL 3 TIMES DAILY
Qty: 270 TABLET | Refills: 1 | Status: SHIPPED | OUTPATIENT
Start: 2023-12-19

## 2023-12-19 RX ORDER — AZILSARTAN KAMEDOXOMIL AND CHLORTHALIDONE 40; 25 MG/1; MG/1
1 TABLET ORAL DAILY
Qty: 90 TABLET | Refills: 1 | Status: SHIPPED | OUTPATIENT
Start: 2023-12-19 | End: 2024-01-11

## 2023-12-19 RX ORDER — AZILSARTAN KAMEDOXOMIL AND CHLORTHALIDONE 40; 25 MG/1; MG/1
1 TABLET ORAL DAILY
Qty: 90 TABLET | Refills: 1 | Status: SHIPPED | OUTPATIENT
Start: 2023-12-19 | End: 2023-12-19 | Stop reason: SDUPTHER

## 2024-01-11 ENCOUNTER — HOSPITAL ENCOUNTER (OUTPATIENT)
Dept: RADIOLOGY | Facility: HOSPITAL | Age: 59
Discharge: HOME | End: 2024-01-11
Attending: INTERNAL MEDICINE
Payer: COMMERCIAL

## 2024-01-11 DIAGNOSIS — I10 ESSENTIAL HYPERTENSION: ICD-10-CM

## 2024-01-11 DIAGNOSIS — C18.2 PRIMARY ADENOCARCINOMA OF ASCENDING COLON: ICD-10-CM

## 2024-01-11 LAB
CREAT SERPL-MCNC: 1.4 MG/DL (ref 0.5–1.4)
SAMPLE: NORMAL

## 2024-01-11 PROCEDURE — 25500020 PHARM REV CODE 255: Mod: PO | Performed by: INTERNAL MEDICINE

## 2024-01-11 PROCEDURE — 74177 CT ABD & PELVIS W/CONTRAST: CPT | Mod: TC,PO

## 2024-01-11 RX ORDER — AZILSARTAN KAMEDOXOMIL AND CHLORTHALIDONE 40; 25 MG/1; MG/1
1 TABLET ORAL
Qty: 90 TABLET | Refills: 1 | Status: SHIPPED | OUTPATIENT
Start: 2024-01-11

## 2024-01-11 RX ADMIN — IOHEXOL 100 ML: 350 INJECTION, SOLUTION INTRAVENOUS at 08:01

## 2024-01-17 ENCOUNTER — LAB VISIT (OUTPATIENT)
Dept: LAB | Facility: HOSPITAL | Age: 59
End: 2024-01-17
Attending: INTERNAL MEDICINE
Payer: COMMERCIAL

## 2024-01-17 DIAGNOSIS — C18.2 PRIMARY ADENOCARCINOMA OF ASCENDING COLON: ICD-10-CM

## 2024-01-17 LAB
ALBUMIN SERPL BCP-MCNC: 4.2 G/DL (ref 3.5–5.2)
ALP SERPL-CCNC: 63 U/L (ref 55–135)
ALT SERPL W/O P-5'-P-CCNC: 20 U/L (ref 10–44)
ANION GAP SERPL CALC-SCNC: 10 MMOL/L (ref 8–16)
AST SERPL-CCNC: 22 U/L (ref 10–40)
BASOPHILS # BLD AUTO: 0.03 K/UL (ref 0–0.2)
BASOPHILS NFR BLD: 0.6 % (ref 0–1.9)
BILIRUB SERPL-MCNC: 0.9 MG/DL (ref 0.1–1)
BUN SERPL-MCNC: 17 MG/DL (ref 6–20)
CALCIUM SERPL-MCNC: 10.1 MG/DL (ref 8.7–10.5)
CEA SERPL-MCNC: 1.6 NG/ML (ref 0–5)
CHLORIDE SERPL-SCNC: 101 MMOL/L (ref 95–110)
CO2 SERPL-SCNC: 30 MMOL/L (ref 23–29)
CREAT SERPL-MCNC: 1.3 MG/DL (ref 0.5–1.4)
CREAT SERPL-MCNC: 1.3 MG/DL (ref 0.5–1.4)
DIFFERENTIAL METHOD BLD: ABNORMAL
EOSINOPHIL # BLD AUTO: 0.1 K/UL (ref 0–0.5)
EOSINOPHIL NFR BLD: 2.5 % (ref 0–8)
ERYTHROCYTE [DISTWIDTH] IN BLOOD BY AUTOMATED COUNT: 13.2 % (ref 11.5–14.5)
EST. GFR  (NO RACE VARIABLE): >60 ML/MIN/1.73 M^2
EST. GFR  (NO RACE VARIABLE): >60 ML/MIN/1.73 M^2
FERRITIN SERPL-MCNC: 143.9 NG/ML (ref 20–300)
GLUCOSE SERPL-MCNC: 103 MG/DL (ref 70–110)
HCT VFR BLD AUTO: 47.5 % (ref 40–54)
HGB BLD-MCNC: 15.5 G/DL (ref 14–18)
IMM GRANULOCYTES # BLD AUTO: 0.01 K/UL (ref 0–0.04)
IMM GRANULOCYTES NFR BLD AUTO: 0.2 % (ref 0–0.5)
IRON SERPL-MCNC: 83 UG/DL (ref 45–160)
LYMPHOCYTES # BLD AUTO: 2.3 K/UL (ref 1–4.8)
LYMPHOCYTES NFR BLD: 46.4 % (ref 18–48)
MCH RBC QN AUTO: 27.5 PG (ref 27–31)
MCHC RBC AUTO-ENTMCNC: 32.6 G/DL (ref 32–36)
MCV RBC AUTO: 84 FL (ref 82–98)
MONOCYTES # BLD AUTO: 0.6 K/UL (ref 0.3–1)
MONOCYTES NFR BLD: 11.3 % (ref 4–15)
NEUTROPHILS # BLD AUTO: 1.9 K/UL (ref 1.8–7.7)
NEUTROPHILS NFR BLD: 39 % (ref 38–73)
NRBC BLD-RTO: 0 /100 WBC
PLATELET # BLD AUTO: 247 K/UL (ref 150–450)
PMV BLD AUTO: 9.1 FL (ref 9.2–12.9)
POTASSIUM SERPL-SCNC: 3.8 MMOL/L (ref 3.5–5.1)
PROT SERPL-MCNC: 7.6 G/DL (ref 6–8.4)
RBC # BLD AUTO: 5.63 M/UL (ref 4.6–6.2)
SATURATED IRON: 26 % (ref 20–50)
SODIUM SERPL-SCNC: 141 MMOL/L (ref 136–145)
TOTAL IRON BINDING CAPACITY: 322 UG/DL (ref 250–450)
TRANSFERRIN SERPL-MCNC: 230 MG/DL (ref 200–375)
WBC # BLD AUTO: 4.87 K/UL (ref 3.9–12.7)

## 2024-01-17 PROCEDURE — 82378 CARCINOEMBRYONIC ANTIGEN: CPT | Performed by: INTERNAL MEDICINE

## 2024-01-17 PROCEDURE — 82728 ASSAY OF FERRITIN: CPT | Performed by: INTERNAL MEDICINE

## 2024-01-17 PROCEDURE — 83540 ASSAY OF IRON: CPT | Performed by: INTERNAL MEDICINE

## 2024-01-17 PROCEDURE — 80053 COMPREHEN METABOLIC PANEL: CPT | Performed by: INTERNAL MEDICINE

## 2024-01-17 PROCEDURE — 36415 COLL VENOUS BLD VENIPUNCTURE: CPT | Performed by: INTERNAL MEDICINE

## 2024-01-17 PROCEDURE — 85025 COMPLETE CBC W/AUTO DIFF WBC: CPT | Performed by: INTERNAL MEDICINE

## 2024-01-17 NOTE — PROGRESS NOTES
[facility] Hematology/Oncology  PROGRESS NOTE - Follow-up Visit       Subjective:       Patient ID:   NAME: Vince Kwon : 1965     58 y.o. male    Referring Doc: Ugo Johnson  Other Physicians: Natalya Guteirrez    Chief Complaint:  Colon ca f/u    History of Present Illness:     Patient is being seen today in person for a regularly scheduled visit; The patient is here by himself.     He is doing ok with no new issues.  Bowel movements have been normal. He denies any CP, SOB, HA's or N/V.     He last saw Dr Hoang on 2023    He still has seen Dr Johnson and was suppose to have repeat scope in 2023 but did not have it done due to insurnace         Discussed covid19 precautions - he has not been vaccinated but had covid in 2022 and required an Ig infusion         .         ROS:   GEN: normal without any fever, night sweats or weight loss  HEENT: normal with no HA's, sore throat, stiff neck, changes in vision  CV: normal with no CP, SOB, PND, LIGHT or orthopnea  PULM: normal with no SOB, cough, hemoptysis, sputum or pleuritic pain  GI: normal with no abdominal pain, nausea, vomiting, constipation, diarrhea, melanotic stools, BRBPR, or hematemesis  : normal with no hematuria, dysuria  BREAST: normal with no mass, discharge, pain  SKIN: normal with no rash, erythema, bruising, or swelling    Allergies:  Review of patient's allergies indicates:  No Known Allergies    Medications:    Current Outpatient Medications:     amLODIPine (NORVASC) 10 MG tablet, Take 1 tablet (10 mg total) by mouth once daily., Disp: 90 tablet, Rfl: 1    ascorbic acid, vitamin C, (VITAMIN C) 1000 MG tablet, Take 1,000 mg by mouth once daily., Disp: , Rfl:     aspirin (ECOTRIN) 81 MG EC tablet, Take 81 mg by mouth., Disp: , Rfl:     atorvastatin (LIPITOR) 40 MG tablet, Take 1 tablet (40 mg total) by mouth once daily. Take one tablet daily., Disp: 90 tablet, Rfl: 1    EDARBYCLOR 40-25 mg Tab, TAKE ONE TABLET BY  MOUTH ONCE DAILY, Disp: 90 tablet, Rfl: 1    labetaloL (NORMODYNE) 200 MG tablet, Take 1 tablet (200 mg total) by mouth 2 (two) times daily., Disp: 180 tablet, Rfl: 1    magnesium 30 mg Tab, Take 1 tablet by mouth once., Disp: , Rfl:     potassium chloride SA (K-DUR,KLOR-CON) 20 MEQ tablet, Take 1 tablet (20 mEq total) by mouth 3 (three) times daily., Disp: 270 tablet, Rfl: 1    zinc 50 mg Tab, Take by mouth., Disp: , Rfl:     PMHx/PSHx Updates:  See patient's last visit with me on 7/20/2023  See H&P on 2/20/2018        Pathology:    3/7/2018:    FINAL DIAGNOSIS:  RIGHT COLON, HEMICOLECTOMY:    ULCERATED MODERATE TO MODERATELY WELL 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    THE TUMOR PENETRATES THE MUSCULARIS PROPRIA AND INVADES THE  PERICOLONIC FAT.    THE PROXIMAL (ILEAL) AND DISTAL (COLONIC) SURGICAL MARGINS OF  RESECTION ARE FREE OF    MALIGNANCY.    A TOTAL OF TWENTY-SEVEN (27) PERICOLONIC LYMPH NODES ARE FREE OF  METASTATIC TUMOR.    APPENDIX: REACTIVE MUCOSAL LYMPHOID HYPERPLASIA.     NO EVIDENCE OF METASTATIC CARCINOMA.    Procedure:  Right hemicolectomy  Specimen Length (if applicable):  <CR-*Specimen Length (if applicable):     12.0 cm>  Tumor Site:  Cecum  Tumor Location  Tumor Size:  Greatest dimension:  4.5 cm  Macroscopic Tumor Perforation:  Not identified  Macroscopic Intactness of Mesorectum:  Histologic Type:  Adenocarcinoma, focal mucinous (colloid) carcinoma  component.  Histologic Grade:  Low grade (well differentiated to moderately  differentiated)  Histologic Features Suggestive of Microsatellite Instability:  Intratumoral Lymphocytic Response (tumor-infiltrating lymphocytes):  Peritumor Lymphocytic Response (Crohn-like response):  Tumor Subtype and Differentiation:  Microscopic Tumor Extension:  Tumor invades through the muscularis  propria into the subserosal adipose tissue or the  "nonperitonealized  pericolic or perirectal soft tissues but does not extend to the serosal  surface  Margins    TNM Descriptors:  Primary Tumor (pT):  pT3:  Tumor invades through the muscularis propria  into pericolorectal tissues  Regional Lymph Nodes (pN):  pN0:  No regional lymph node metastasis  Distant Metastasis:  Not applicable.      Objective:     Vitals:  Blood pressure (!) 147/89, pulse 93, temperature 97.5 °F (36.4 °C), resp. rate 16, height 5' 8" (1.727 m), weight 111.2 kg (245 lb 3.2 oz).        Physical Examination:   GEN: no apparent distress, comfortable; AAOx3; overweight  HEAD: atraumatic and normocephalic  EYES: no conjunctival pallor or muddiness, no icterus; normal pupil reaction to ambient light  ENT: OMM, no pharyngeal erythema, external bilateral ears WNL; no visible thrush or ulcers  NECK: no masses or swelling, trachea midline, no visible LAD/LN's   CV: no palpitations; no pedal edema; no noticeable JVD or neck vein distension  CHEST: Normal respiratory effort; chest wall breath movements symmetrical; no audible wheezing  ABDOM: non-distended; no bloating  MUSC/Skeletal: ROM normal; joints visibly normal; no deformities or arthropathy  EXTREM: no clubbing, cyanosis, inflammation or swelling  SKIN: no rashes, lesions, ulcers, petechiae or subcutaneous nodules  : no escamilla  NEURO: moving all 4 extremities; AAOx3; no tremors  PSYCH: normal mood, affect and behavior  LYMPH: no visible LN's or LAD              Labs:        Lab Results   Component Value Date    WBC 4.87 01/17/2024    HGB 15.5 01/17/2024    HCT 47.5 01/17/2024    MCV 84 01/17/2024     01/17/2024     BMP  Lab Results   Component Value Date     01/17/2024    K 3.8 01/17/2024     01/17/2024    CO2 30 (H) 01/17/2024    BUN 17 01/17/2024    CREATININE 1.3 01/17/2024    CREATININE 1.3 01/17/2024    CALCIUM 10.1 01/17/2024    ANIONGAP 10 01/17/2024    ESTGFRAFRICA >60.0 10/15/2021    EGFRNONAA >60.0 10/15/2021     Lab " Results   Component Value Date    ALT 20 01/17/2024    AST 22 01/17/2024    ALKPHOS 63 01/17/2024    BILITOT 0.9 01/17/2024     Lab Results   Component Value Date    CEA 1.6 01/17/2024           Radiology/Diagnostic Studies:    CT Scans 1/11/2024:      IMPRESSION:     No findings of recurrence of malignancy or metastatic disease.    Ct Scans 12/9/2022:    1. Right pulmonary nodule is unchanged.  2. No adenopathy. There are a few nonenlarged lymph nodes in the central abdomen.         CT 12/3/2021:  IMPRESSION:     No findings of metastatic disease or significant detrimental interval change.     Small noncalcified right-sided pulmonary nodules.     Additional observations as above.         CT scans  10/9/2020:    IMPRESSION:     1.  No acute intra-abdominal abnormalities identified.  2.  Small focal groundglass nodular-like density in the right  lateral ventricle lobes unchanged.  3.  Mild bilateral dependent subsegmental atelectasis.        CT chest scan  3/27/2020:    Impression       Stable soft tissue attenuation noncalcified pulmonary nodules with no new concerning pulmonary nodule or mass identified.  No findings to specifically suggest metastatic disease.           CT scans  9/20/2019:    Impression       No evidence of recurrence or metastatic disease    Stable tiny noncalcified nodules in the right lung           Ct scan 2/1/2019:    IMPRESSION:  1. Interval development of several prominent to borderline enlarged lymph nodes  within the central mesentery and right lower quadrant, nonspecific. These are  suspicious for metastatic disease, given lymphadenopathy seen on the patient's  original preoperative CT of 02/23/2018. Correlation with serum tumor markers,  and consideration for further evaluation with PET CT, are recommended.  2. No additional evidence for metastatic disease in the abdomen or the pelvis.          CT scans 7/27/2018:    IMPRESSION:    1. Stable appearance of subcentimeter right middle lobe  and right upper lobe  pulmonary nodules.  2. Status post partial colonic resection with expected postoperative changes,  as discussed above. No residual mass or pathologically enlarged lymph nodes in  the abdomen or pelvis.  3. Incidental findings as above.            PET/CT: 4/11/2018    IMPRESSION:    1. Persistent mild wall thickening centered about site of ileocolonic  anastomosis with associated FDG uptake is most likely postoperative in nature  and due to residual inflammation. Residual malignancy cannot be excluded on the  basis of imaging alone, and correlation with surgical resection margins is  requested.    2. No current convincing evidence of metastatic disease.    3. 3 mm nodular density which is vague in superior right middle lobe is nonspecific. CT thorax without IV contrast follow-up is recommended within 3-6  months to simply document stability.          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 Comparison: None Findings: Cardiac silhouette size is normal. Lungs are clear without effusion. No pneumothorax. No acute osseous abnormality. IMPRESSION: No acute pulmonary process. Read and electronically signed by: Shubham Marino MD on 2/28/2018 6:22 PM CST SHUBHAM MARINO MD    Ct Abdomen Pelvis With Contrast    Result Date: 2/23/2018  CMS MANDATED QUALITY DATA - CT RADIATION ? 436 All CT scans at this facility utilize dose modulation, iterative reconstruction, and/or weight based dosing when appropriate to reduce radiation dose to as low as reasonably achievable. CLINICAL HISTORY: 52 years (1965) Male K63.9 TECHNIQUE: MDI ABDOMEN AND PELVIS W  CONTRAST CT. 296 images obtained. Axial CT images of the abdomen and pelvis were obtained from the dome of the diaphragm to the proximal thigh. CONTRAST: 100 mL of IV Omni 350 was administered uneventfully by IV. Oral contrast was also  administered COMPARISON: None available. FINDINGS: Lower Thorax: The lung bases are clear. The heart is normal in size and there is no pericardial effusion. CT Abdomen: Liver: The liver is normal size and imaging appearance. Gallbladder: The gallbladder is unremarkable without acute cholecystitis. Biliary Tree: There is no intra or extrahepatic duct dilation. Spleen: The spleen is normal. Pancreas: The pancreas is normal. Adrenal Glands: The adrenal glands appear within normal limits. Kidneys: The kidneys are normal in imaging appearance without hydronephrosis or hydroureter. Vasculature: The aorta is normal in course and caliber. Lymph nodes: No abdominal lymphadenopathy is seen by size criteria. Intraperitoneal structures: There is no ascites. Bowel:  There is an apple core like lesion in the ascending colon extending over a length of approximately 5 cm (10 o'clock-8 o'clock position involving a majority of the circumference) the soft tissue mass measures approximately 2 cm in thickness. There are numerous small rounded lymph nodes in the cecal mesentery, none of which are enlarged by size criteria, however given the morphology and location these may be involved, for example a 15 x 7 mm node (image 121). Abdominal wall: The abdominal wall and musculature are normal. Musculoskeletal: There is a transitional lumbosacral vertebral body (S1) with a hypoplastic disc at S1-S2. There is moderate to severe disc height loss at L4-L5. No aggressive appearing lytic or blastic lesion is identified. CT Pelvis: Bladder: The urinary bladder is within normal limits. Reproductive Organs: The prostate and seminal vesicles are within normal limits. Pelvic Lymph nodes: No pelvic lymphadenopathy or pelvic mass is identified. IMPRESSION: 1. Apple-core like lesion in the descending colon most consistent with a primary colonic adenocarcinoma, there is no evidence of obstruction, pneumatosis intra-abdominal free air or abscess. 2. No  "lymphadenopathy by size criteria and no finding to specifically suggest metastatic disease in the abdomen or pelvis. Read and electronically signed by: Singh Colvin MD on 2/23/2018 9:53 AM CST SINGH COLVIN MD      I have reviewed all available lab results and radiology reports.    Assessment/Plan:   (1) 58 y.o. male with diagnosis of right colon mass found on recent colonoscopy on 2/16/2018 with Dr Johnson.  - he had been seen by Dr Gutierrez with [facility]   - s/p right colectomy with Dr Gutierrez on 3/7/2018  - 27 LN's were all negative  - T3 N0 and Stage IIA  - low grade adenocarcinoma  - PET scan on 4/11/2018 with no definitive evidence of ant metastatic process  - repeat CEA was 2.1  - will most likely not need chemotherapy if he remains a Stage II  - MMR was negative but all of all JESSICA was "stable" which has been shown to illicit a poorer prognosis in general but would not changes the current plan of therapy (discussed with the patient in detail)  - prior CT scans on 7/27/2018 which appeared to be stable; however, repeat CT on 2/1/2019 showed some increase in LN's which needed to be addressed with PET scans but his insurance declined   - latest CT's on 9/20/2019 with no evidence of recurrent cancer  - last colonoscopy was good per patient with another one planned for 3 yrs      10/12/2020:  - latest CT scans on 10/9/2020 appear to be stable  - CEA level good at 1.4  - no new bowel issues  - repeat scope in about 2 yrs per patient    4/29/2021:  - he had recent labs done with Dr Driver but did not have CEA done  - he was supposed to get repeat scans this month but has not done so    10/28/2021:  - insurance denied approval for his PET  - will look into getting CT scans instead  - CEA was 1.7  - he sees GI again in March 2022    5/10/2022:  - He last saw Dr Hoang on  3/18/2022   - he is seeing Dr Johnson again in June 2022 for repeat scopes  -  He last had CT scans on 12/3/2021  - CEA was at " 1.6    12/13/2022:  - recent CT scans on 12/9 appear to be relatively stable  - CEA was 2.0  - he was supposed to get repeat scope in June 2022 but did not due to insurance  - asked patient to reach out to GI again     7/20/2023:  - he was suppose to get repeat scope in April 2023 but did not do so due to insurance declining authorization per patient - will reach out to Dr johnson to see what is the issue  - CEA was good 1.9  - no significant anemia issues  - repeat scans in Dec 2023    1/18/2024:  - bowels moving ok  - still has not followed up with GI for repeat endscopy due to insurance issues  - CEA at 1.6  - CT scans 1/11/2024 negative for any recurrence           (2) HTN and hypercholesterolemia     (3) Prior mini-CVA - HTN related; no subsequent deficits; sounds like he had a TIA    (4) RML lung nodule on PEt at 3mm with no FDG uptake; CT stable;  - he last saw Dr Crockett with pulmonary on 4/2/2019  - latest Ct was stable on 9/20/2019 and again on 3/27/2020    1. Primary adenocarcinoma of ascending colon              PLAN:  1. Check with GI on having follow-up endoscopies scheduled; beyond the fuive year madhav, so no further scans unless indicated/necessary  2. F/u with GI, PCP, Gen Surg, pulm etc  3. F/u with GI   4. Encouraged compliance with f/u with Pulm, etc  5. Check up to date labs incl CEA every 6 months  6. RTC 12 months    Fax note to Natalya Johnson Jimmy L. Jr., MD, [facility] and [facility] Staff    Discussion:       Total Time spent on patient:    I spent over 25 mins of time with the patient. Reviewed results of the recently ordered labs, tests, reports and studies; made directives with regards to the results. Over half of this time was spent couseling and coordinating care, making treatment and analytical decisions; ordering necessary labs, tests and studies; and discussing treatment options and setting up treatment plan(s) if indicated.    COVID-19 Discussion:    I had long discussion with  patient and any applicable family about the COVID-19 coronavirus epidemic and the recommended precautions with regard to cancer and/or hematology patients. I have re-iterated the CDC recommendations for adequate hand washing, use of hand -like products, and coughing into elbow, etc. In addition, especially for our patients who are on chemotherapy and/or our otherwise immunocompromised patients, I have recommended avoidance of crowds, including movie theaters, restaurants, churches, etc. I have recommended avoidance of any sick or symptomatic family members and/or friends. I have also recommended avoidance of any raw and unwashed food products, and general avoidance of food items that have not been prepared by themselves. The patient has been asked to call us immediately with any symptom developments, issues, questions or other general concerns.            I have explained all of the above in detail and the patient understands all of the current recommendation(s). I have answered all of their questions to the best of my ability and to their complete satisfaction.   The patient is to continue with the current management plan.            Electronically signed by Carlo Welch MD

## 2024-01-18 ENCOUNTER — TELEPHONE (OUTPATIENT)
Dept: HEMATOLOGY/ONCOLOGY | Facility: CLINIC | Age: 59
End: 2024-01-18

## 2024-01-18 ENCOUNTER — OFFICE VISIT (OUTPATIENT)
Dept: HEMATOLOGY/ONCOLOGY | Facility: CLINIC | Age: 59
End: 2024-01-18
Payer: COMMERCIAL

## 2024-01-18 VITALS
HEIGHT: 68 IN | BODY MASS INDEX: 37.16 KG/M2 | SYSTOLIC BLOOD PRESSURE: 147 MMHG | WEIGHT: 245.19 LBS | RESPIRATION RATE: 16 BRPM | DIASTOLIC BLOOD PRESSURE: 89 MMHG | HEART RATE: 93 BPM | TEMPERATURE: 98 F

## 2024-01-18 DIAGNOSIS — E78.00 PURE HYPERCHOLESTEROLEMIA: ICD-10-CM

## 2024-01-18 DIAGNOSIS — C18.2 PRIMARY ADENOCARCINOMA OF ASCENDING COLON: Primary | ICD-10-CM

## 2024-01-18 DIAGNOSIS — I10 ESSENTIAL HYPERTENSION: ICD-10-CM

## 2024-01-18 PROCEDURE — 99214 OFFICE O/P EST MOD 30 MIN: CPT | Mod: S$GLB,,, | Performed by: INTERNAL MEDICINE

## 2024-01-18 PROCEDURE — 3077F SYST BP >= 140 MM HG: CPT | Mod: CPTII,S$GLB,, | Performed by: INTERNAL MEDICINE

## 2024-01-18 PROCEDURE — 3008F BODY MASS INDEX DOCD: CPT | Mod: CPTII,S$GLB,, | Performed by: INTERNAL MEDICINE

## 2024-01-18 PROCEDURE — 1159F MED LIST DOCD IN RCRD: CPT | Mod: CPTII,S$GLB,, | Performed by: INTERNAL MEDICINE

## 2024-01-18 PROCEDURE — 3079F DIAST BP 80-89 MM HG: CPT | Mod: CPTII,S$GLB,, | Performed by: INTERNAL MEDICINE

## 2024-01-18 PROCEDURE — 1160F RVW MEDS BY RX/DR IN RCRD: CPT | Mod: CPTII,S$GLB,, | Performed by: INTERNAL MEDICINE

## 2024-01-18 RX ORDER — ATORVASTATIN CALCIUM 40 MG/1
40 TABLET, FILM COATED ORAL DAILY
Qty: 90 TABLET | Refills: 1 | Status: SHIPPED | OUTPATIENT
Start: 2024-01-18 | End: 2024-05-22 | Stop reason: SDUPTHER

## 2024-01-18 RX ORDER — AMLODIPINE BESYLATE 10 MG/1
10 TABLET ORAL DAILY
Qty: 90 TABLET | Refills: 1 | Status: SHIPPED | OUTPATIENT
Start: 2024-01-18 | End: 2024-05-22 | Stop reason: SDUPTHER

## 2024-01-19 ENCOUNTER — TELEPHONE (OUTPATIENT)
Dept: HEMATOLOGY/ONCOLOGY | Facility: CLINIC | Age: 59
End: 2024-01-19

## 2024-01-19 DIAGNOSIS — C18.2 PRIMARY ADENOCARCINOMA OF ASCENDING COLON: Primary | ICD-10-CM

## 2024-01-19 NOTE — TELEPHONE ENCOUNTER
Per Dr. Welch's verbal orders, I placed referral to Dr. Johnson, GI, attempted to call patient to make aware that we spoke to Dr. Johnson's office and there were some issues with cancelled appts, not being able to contact him but that new referral has been sent, no answer, no VM available. Sia MAGAÑA made aware of referral and to send with all needed documents.

## 2024-03-13 DIAGNOSIS — R73.03 PREDIABETES: ICD-10-CM

## 2024-05-22 ENCOUNTER — OFFICE VISIT (OUTPATIENT)
Dept: FAMILY MEDICINE | Facility: CLINIC | Age: 59
End: 2024-05-22
Payer: COMMERCIAL

## 2024-05-22 VITALS
WEIGHT: 241.75 LBS | DIASTOLIC BLOOD PRESSURE: 76 MMHG | BODY MASS INDEX: 36.64 KG/M2 | TEMPERATURE: 97 F | HEART RATE: 79 BPM | SYSTOLIC BLOOD PRESSURE: 130 MMHG | HEIGHT: 68 IN | OXYGEN SATURATION: 98 %

## 2024-05-22 DIAGNOSIS — E78.00 PURE HYPERCHOLESTEROLEMIA: ICD-10-CM

## 2024-05-22 DIAGNOSIS — R73.01 IMPAIRED FASTING GLUCOSE: ICD-10-CM

## 2024-05-22 DIAGNOSIS — E87.6 HYPOKALEMIA: ICD-10-CM

## 2024-05-22 DIAGNOSIS — Z12.5 PROSTATE CANCER SCREENING: ICD-10-CM

## 2024-05-22 DIAGNOSIS — I10 ESSENTIAL HYPERTENSION: Primary | ICD-10-CM

## 2024-05-22 PROCEDURE — 99999 PR PBB SHADOW E&M-EST. PATIENT-LVL III: CPT | Mod: PBBFAC,,, | Performed by: INTERNAL MEDICINE

## 2024-05-22 PROCEDURE — 3078F DIAST BP <80 MM HG: CPT | Mod: CPTII,S$GLB,, | Performed by: INTERNAL MEDICINE

## 2024-05-22 PROCEDURE — 1159F MED LIST DOCD IN RCRD: CPT | Mod: CPTII,S$GLB,, | Performed by: INTERNAL MEDICINE

## 2024-05-22 PROCEDURE — 3075F SYST BP GE 130 - 139MM HG: CPT | Mod: CPTII,S$GLB,, | Performed by: INTERNAL MEDICINE

## 2024-05-22 PROCEDURE — 3008F BODY MASS INDEX DOCD: CPT | Mod: CPTII,S$GLB,, | Performed by: INTERNAL MEDICINE

## 2024-05-22 PROCEDURE — 99214 OFFICE O/P EST MOD 30 MIN: CPT | Mod: S$GLB,,, | Performed by: INTERNAL MEDICINE

## 2024-05-22 RX ORDER — AZILSARTAN KAMEDOXOMIL AND CHLORTHALIDONE 40; 25 MG/1; MG/1
1 TABLET ORAL DAILY
Qty: 90 TABLET | Refills: 1 | Status: SHIPPED | OUTPATIENT
Start: 2024-05-22

## 2024-05-22 RX ORDER — ATORVASTATIN CALCIUM 40 MG/1
40 TABLET, FILM COATED ORAL DAILY
Qty: 90 TABLET | Refills: 1 | Status: SHIPPED | OUTPATIENT
Start: 2024-05-22

## 2024-05-22 RX ORDER — LABETALOL 200 MG/1
200 TABLET, FILM COATED ORAL 2 TIMES DAILY
Qty: 180 TABLET | Refills: 1 | Status: SHIPPED | OUTPATIENT
Start: 2024-05-22

## 2024-05-22 RX ORDER — POTASSIUM CHLORIDE 20 MEQ/1
20 TABLET, EXTENDED RELEASE ORAL 3 TIMES DAILY
Qty: 270 TABLET | Refills: 1 | Status: SHIPPED | OUTPATIENT
Start: 2024-05-22

## 2024-05-22 RX ORDER — AMLODIPINE BESYLATE 10 MG/1
10 TABLET ORAL DAILY
Qty: 90 TABLET | Refills: 1 | Status: SHIPPED | OUTPATIENT
Start: 2024-05-22

## 2024-05-22 NOTE — PROGRESS NOTES
Subjective:       Patient ID: Vince Kwon is a 58 y.o. male.    Chief Complaint: Hypertension (5 months follow up) and Hyperlipidemia    Here for routine follow up; last visit note, most recent available labs, and health maintenance topics reviewed.  Followed by Heme/Onc and GI for h/o colon cancer.  His colonoscopy is due but has been having some issues with insurance; scheduled for next month.  CEA has been stable; repeat labs planned for July.    Hypertension  This is a chronic problem. The problem is controlled (120s/80s at home). Pertinent negatives include no anxiety, chest pain, headaches, malaise/fatigue, neck pain, palpitations, peripheral edema or shortness of breath. Risk factors for coronary artery disease include male gender, obesity and dyslipidemia. Past treatments include beta blockers, calcium channel blockers, diuretics and angiotensin blockers. The current treatment provides significant improvement.   Hyperlipidemia  This is a chronic problem. The problem is controlled. Recent lipid tests were reviewed and are normal. Exacerbating diseases include obesity. Pertinent negatives include no chest pain, myalgias or shortness of breath. Current antihyperlipidemic treatment includes statins. The current treatment provides mild improvement of lipids. There are no compliance problems.      Review of Systems   Constitutional:  Negative for activity change, appetite change, chills, diaphoresis, fatigue, fever, malaise/fatigue and unexpected weight change.   HENT:  Negative for congestion, ear discharge, ear pain, hearing loss, nosebleeds, postnasal drip, rhinorrhea, sinus pressure, sinus pain, sneezing, sore throat, tinnitus, trouble swallowing and voice change.    Eyes:  Negative for photophobia, pain, discharge, redness, itching and visual disturbance.   Respiratory:  Negative for apnea, cough, choking, chest tightness, shortness of breath and wheezing.    Cardiovascular:  Negative for chest  pain, palpitations and leg swelling.   Gastrointestinal:  Negative for abdominal distention, abdominal pain, blood in stool, constipation, diarrhea, nausea and vomiting.   Endocrine: Negative for cold intolerance, heat intolerance, polydipsia and polyuria.   Genitourinary:  Negative for decreased urine volume, difficulty urinating, dysuria, enuresis, flank pain, frequency, genital sores, hematuria, penile discharge, penile pain, scrotal swelling, testicular pain and urgency.   Musculoskeletal:  Negative for arthralgias, back pain, gait problem, joint swelling, myalgias, neck pain and neck stiffness.   Skin:  Negative for rash and wound.   Allergic/Immunologic: Negative for environmental allergies, food allergies and immunocompromised state.   Neurological:  Negative for dizziness, tremors, seizures, syncope, facial asymmetry, speech difficulty, weakness, light-headedness, numbness and headaches.   Hematological:  Negative for adenopathy. Does not bruise/bleed easily.   Psychiatric/Behavioral:  Negative for confusion, decreased concentration, hallucinations, self-injury, sleep disturbance and suicidal ideas. The patient is not nervous/anxious.        Past Medical History:   Diagnosis Date    Colon tumor     Hyperlipidemia     Hypertension     Hypokalemia 3/1/2018    Multiple pulmonary nodules 8/10/2018      Past Surgical History:   Procedure Laterality Date    COLON SURGERY  03/07/2018    COLONOSCOPY      FINGER SURGERY      RIGHT COLECTOMY Right 03/07/2018           Family History   Problem Relation Name Age of Onset    Diabetes Mother      Heart disease Mother      Diabetes Sister      Breast cancer Sister      Diabetes Maternal Aunt      Diabetes Maternal Grandmother      Heart disease Maternal Grandmother      Heart disease Maternal Grandfather         Social History     Socioeconomic History    Marital status: Single   Occupational History    Occupation: shipping and recieving   Tobacco Use    Smoking  "status: Former    Smokeless tobacco: Never   Substance and Sexual Activity    Alcohol use: Yes     Comment: occ    Drug use: No    Sexual activity: Yes     Partners: Female   Social History Narrative    Lives alone     Social Determinants of Health     Stress: No Stress Concern Present (6/28/2019)    [employer] - Occupational Stress Questionnaire     Feeling of Stress : Not at all       Current Outpatient Medications   Medication Sig Dispense Refill    ascorbic acid, vitamin C, (VITAMIN C) 1000 MG tablet Take 1,000 mg by mouth once daily.      aspirin (ECOTRIN) 81 MG EC tablet Take 81 mg by mouth.      magnesium 30 mg Tab Take 1 tablet by mouth once.      zinc 50 mg Tab Take by mouth.      amLODIPine (NORVASC) 10 MG tablet Take 1 tablet (10 mg total) by mouth once daily. 90 tablet 1    atorvastatin (LIPITOR) 40 MG tablet Take 1 tablet (40 mg total) by mouth once daily. Take one tablet daily. 90 tablet 1    azilsartan med-chlorthalidone (EDARBYCLOR) 40-25 mg Tab Take 1 tablet by mouth Daily. 90 tablet 1    labetaloL (NORMODYNE) 200 MG tablet Take 1 tablet (200 mg total) by mouth 2 (two) times daily. 180 tablet 1    potassium chloride SA (K-DUR,KLOR-CON) 20 MEQ tablet Take 1 tablet (20 mEq total) by mouth 3 (three) times daily. 270 tablet 1     No current facility-administered medications for this visit.       Review of patient's allergies indicates:  No Known Allergies  Objective:    HPI     Hypertension     Additional comments: 5 months follow up          Last edited by Jose R Willis MA on 5/22/2024  3:25 PM.      Blood pressure 130/76, pulse 79, temperature 96.6 °F (35.9 °C), temperature source Temporal, height 5' 8" (1.727 m), weight 109.6 kg (241 lb 11.8 oz), SpO2 98%. Body mass index is 36.76 kg/m².   Physical Exam  Vitals and nursing note reviewed.   Constitutional:       General: He is not in acute distress.     Appearance: He is well-developed. He is obese. He is not " ill-appearing, toxic-appearing or diaphoretic.   HENT:      Head: Normocephalic and atraumatic.   Eyes:      General: No scleral icterus.        Right eye: No discharge.         Left eye: No discharge.      Conjunctiva/sclera: Conjunctivae normal.   Neck:      Vascular: No carotid bruit.   Cardiovascular:      Rate and Rhythm: Normal rate and regular rhythm.      Heart sounds: Normal heart sounds. No murmur heard.  Pulmonary:      Effort: Pulmonary effort is normal. No respiratory distress.      Breath sounds: Normal breath sounds. No decreased breath sounds, wheezing, rhonchi or rales.   Abdominal:      General: There is no distension.      Palpations: Abdomen is soft.      Tenderness: There is no abdominal tenderness. There is no guarding or rebound.   Musculoskeletal:      Right lower leg: No edema.      Left lower leg: No edema.   Skin:     General: Skin is warm and dry.   Neurological:      Mental Status: He is alert.      Motor: No tremor.   Psychiatric:         Mood and Affect: Mood normal.         Speech: Speech normal.         Behavior: Behavior normal.             Assessment:       1. Essential hypertension    2. Hypokalemia    3. Pure hypercholesterolemia    4. Impaired fasting glucose    5. Prostate cancer screening        Plan:       Vince was seen today for hypertension and hyperlipidemia.    Diagnoses and all orders for this visit:    Essential hypertension  -     azilsartan med-chlorthalidone (EDARBYCLOR) 40-25 mg Tab; Take 1 tablet by mouth Daily.  -     amLODIPine (NORVASC) 10 MG tablet; Take 1 tablet (10 mg total) by mouth once daily.  -     labetaloL (NORMODYNE) 200 MG tablet; Take 1 tablet (200 mg total) by mouth 2 (two) times daily.    Hypokalemia  Comments:  Potassium was finally in normal range on last labs.  He has been trying to follow a high potassium diet.  Orders:  -     potassium chloride SA (K-DUR,KLOR-CON) 20 MEQ tablet; Take 1 tablet (20 mEq total) by mouth 3 (three) times  daily.    Pure hypercholesterolemia  -     atorvastatin (LIPITOR) 40 MG tablet; Take 1 tablet (40 mg total) by mouth once daily. Take one tablet daily.  -     Lipid Panel; Future    Impaired fasting glucose  -     Hemoglobin A1C; Future    Prostate cancer screening  -     PSA, SCREENING; Future

## 2024-07-17 ENCOUNTER — LAB VISIT (OUTPATIENT)
Dept: LAB | Facility: HOSPITAL | Age: 59
End: 2024-07-17
Attending: INTERNAL MEDICINE
Payer: COMMERCIAL

## 2024-07-17 DIAGNOSIS — R73.01 IMPAIRED FASTING GLUCOSE: ICD-10-CM

## 2024-07-17 DIAGNOSIS — Z12.5 PROSTATE CANCER SCREENING: ICD-10-CM

## 2024-07-17 DIAGNOSIS — E78.00 PURE HYPERCHOLESTEROLEMIA: ICD-10-CM

## 2024-07-17 LAB
CHOLEST SERPL-MCNC: 166 MG/DL (ref 120–199)
CHOLEST/HDLC SERPL: 3.1 {RATIO} (ref 2–5)
COMPLEXED PSA SERPL-MCNC: 3.12 NG/ML (ref 0–4)
ESTIMATED AVG GLUCOSE: 128 MG/DL (ref 68–131)
HBA1C MFR BLD: 6.1 % (ref 4.5–6.2)
HDLC SERPL-MCNC: 54 MG/DL (ref 40–75)
HDLC SERPL: 32.5 % (ref 20–50)
LDLC SERPL CALC-MCNC: 92.6 MG/DL (ref 63–159)
NONHDLC SERPL-MCNC: 112 MG/DL
TRIGL SERPL-MCNC: 97 MG/DL (ref 30–150)

## 2024-07-17 PROCEDURE — 84153 ASSAY OF PSA TOTAL: CPT | Performed by: INTERNAL MEDICINE

## 2024-07-17 PROCEDURE — 36415 COLL VENOUS BLD VENIPUNCTURE: CPT | Performed by: INTERNAL MEDICINE

## 2024-07-17 PROCEDURE — 83036 HEMOGLOBIN GLYCOSYLATED A1C: CPT | Performed by: INTERNAL MEDICINE

## 2024-07-17 PROCEDURE — 80061 LIPID PANEL: CPT | Performed by: INTERNAL MEDICINE

## 2024-12-07 ENCOUNTER — OFFICE VISIT (OUTPATIENT)
Dept: URGENT CARE | Facility: CLINIC | Age: 59
End: 2024-12-07
Payer: COMMERCIAL

## 2024-12-07 VITALS
WEIGHT: 241 LBS | RESPIRATION RATE: 18 BRPM | HEART RATE: 67 BPM | SYSTOLIC BLOOD PRESSURE: 147 MMHG | BODY MASS INDEX: 36.53 KG/M2 | DIASTOLIC BLOOD PRESSURE: 85 MMHG | OXYGEN SATURATION: 93 % | TEMPERATURE: 97 F | HEIGHT: 68 IN

## 2024-12-07 DIAGNOSIS — M25.431 PAIN AND SWELLING OF RIGHT WRIST: Primary | ICD-10-CM

## 2024-12-07 DIAGNOSIS — M25.531 PAIN AND SWELLING OF RIGHT WRIST: Primary | ICD-10-CM

## 2024-12-07 DIAGNOSIS — M25.531 RIGHT WRIST PAIN: ICD-10-CM

## 2024-12-07 PROCEDURE — 99214 OFFICE O/P EST MOD 30 MIN: CPT | Mod: S$GLB,,, | Performed by: NURSE PRACTITIONER

## 2024-12-07 RX ORDER — PREDNISONE 20 MG/1
40 TABLET ORAL DAILY
Qty: 10 TABLET | Refills: 0 | Status: SHIPPED | OUTPATIENT
Start: 2024-12-07 | End: 2024-12-12

## 2024-12-07 RX ORDER — COLCHICINE 0.6 MG/1
TABLET ORAL
Qty: 9 TABLET | Refills: 0 | Status: SHIPPED | OUTPATIENT
Start: 2024-12-07

## 2024-12-07 NOTE — PROGRESS NOTES
"Subjective:      Patient ID: Vince Kwon is a 59 y.o. male.    Vitals:  height is 5' 8" (1.727 m) and weight is 109.3 kg (241 lb). His temperature is 97.4 °F (36.3 °C). His blood pressure is 147/85 (abnormal) and his pulse is 67. His respiration is 18 and oxygen saturation is 93% (abnormal).     Chief Complaint: Wrist Pain  -  The pt is a 60 y/o male that presents to the  with c/o right wrist pain that started last night, denies injury. He is a  and is right handed. Denies h/o gout.   -  Has taken ibuprofen    Wrist Pain   The pain is present in the right wrist. This is a new problem. The current episode started yesterday. The problem has been gradually worsening. The quality of the pain is described as aching. Associated symptoms include a limited range of motion and stiffness. He has tried NSAIDS for the symptoms. The treatment provided no relief.       Constitution: Negative.   HENT: Negative.     Neck: neck negative.   Cardiovascular: Negative.    Eyes: Negative.    Respiratory: Negative.     Gastrointestinal: Negative.    Endocrine: negative.   Genitourinary: Negative.    Musculoskeletal:  Positive for abnormal ROM of joint.   Skin: Negative.    Allergic/Immunologic: Negative.    Neurological: Negative.    Hematologic/Lymphatic: Negative.    Psychiatric/Behavioral: Negative.        Objective:     Physical Exam   Constitutional: He is oriented to person, place, and time. He appears well-developed. He does not appear ill. No distress. obesity  HENT:   Head: Normocephalic.   Eyes: Conjunctivae are normal.   Pulmonary/Chest: Effort normal.   Abdominal: Normal appearance.   Musculoskeletal:      Comments: Right wrist with significant swelling and tenderness and mildly warm to touch.    Neurological: He is alert, oriented to person, place, and time and at baseline.   Skin: Skin is warm and dry.   Psychiatric: His behavior is normal. Mood, judgment and thought content normal.   Nursing note " and vitals reviewed.      Assessment:     1. Pain and swelling of right wrist    2. Right wrist pain        Plan:   1- RICE edu given  2- prednisone 40 mg q am x 5 days  3- colchicine daily for 3 days  4- right wrist wrapped with ace.  5- CBC, CMP, Uric acid  6- f/u with PCP later this mo and take results to MD and report 93% spo2 in non-smoker.  -     Pain and swelling of right wrist  -     Uric Acid; Future; Expected date: 12/07/2024  -     Comprehensive Metabolic Panel; Future; Expected date: 12/07/2024  -     CBC Auto Differential; Future; Expected date: 12/07/2024  -     predniSONE (DELTASONE) 20 MG tablet; Take 2 tablets (40 mg total) by mouth once daily. for 5 days  Dispense: 10 tablet; Refill: 0  -     colchicine (COLCRYS) 0.6 mg tablet; 2 tabs  then 1 hr later take 1 for 24 hr period.  Dispense: 9 tablet; Refill: 0    Right wrist pain  -     Uric Acid; Future; Expected date: 12/07/2024  -     Comprehensive Metabolic Panel; Future; Expected date: 12/07/2024  -     CBC Auto Differential; Future; Expected date: 12/07/2024  -     predniSONE (DELTASONE) 20 MG tablet; Take 2 tablets (40 mg total) by mouth once daily. for 5 days  Dispense: 10 tablet; Refill: 0  -     colchicine (COLCRYS) 0.6 mg tablet; 2 tabs  then 1 hr later take 1 for 24 hr period.  Dispense: 9 tablet; Refill: 0

## 2024-12-09 ENCOUNTER — TELEPHONE (OUTPATIENT)
Dept: FAMILY MEDICINE | Facility: CLINIC | Age: 59
End: 2024-12-09
Payer: COMMERCIAL

## 2024-12-09 LAB
ALBUMIN SERPL-MCNC: 4.2 G/DL (ref 3.9–4.9)
ALP SERPL-CCNC: 77 IU/L (ref 44–121)
ALT SERPL-CCNC: 19 IU/L (ref 0–44)
AST SERPL-CCNC: 23 IU/L (ref 0–40)
BASOPHILS # BLD AUTO: NORMAL 10*3/UL
BILIRUB SERPL-MCNC: 0.6 MG/DL (ref 0–1.2)
BUN SERPL-MCNC: 17 MG/DL (ref 8–27)
BUN/CREAT SERPL: 16 (ref 10–24)
CALCIUM SERPL-MCNC: 9.4 MG/DL (ref 8.6–10.2)
CHLORIDE SERPL-SCNC: 98 MMOL/L (ref 96–106)
CO2 SERPL-SCNC: 28 MMOL/L (ref 20–29)
CREAT SERPL-MCNC: 1.07 MG/DL (ref 0.76–1.27)
EOSINOPHIL # BLD AUTO: NORMAL 10*3/UL
EOSINOPHIL NFR BLD AUTO: NORMAL %
EST. GFR  (NO RACE VARIABLE): 79 ML/MIN/1.73
GLOBULIN SER CALC-MCNC: 2.7 G/DL (ref 1.5–4.5)
GLUCOSE SERPL-MCNC: 109 MG/DL (ref 70–99)
HCT VFR BLD AUTO: NORMAL %
HGB BLD-MCNC: NORMAL G/DL
LYMPHOCYTES # BLD AUTO: NORMAL 10*3/UL
LYMPHOCYTES NFR BLD AUTO: NORMAL %
MONOCYTES NFR BLD AUTO: NORMAL %
NEUTROPHILS NFR BLD AUTO: NORMAL %
PLATELET # BLD AUTO: NORMAL 10*3/UL
POTASSIUM SERPL-SCNC: 2.9 MMOL/L (ref 3.5–5.2)
PROT SERPL-MCNC: 6.9 G/DL (ref 6–8.5)
RBC # BLD AUTO: NORMAL 10*6/UL
SODIUM SERPL-SCNC: 138 MMOL/L (ref 134–144)
URATE SERPL-MCNC: 6.4 MG/DL (ref 3.8–8.4)
WBC # BLD AUTO: NORMAL X10E3/UL

## 2024-12-09 NOTE — TELEPHONE ENCOUNTER
Please telephone pt letting him know his gout level was negative but his potassium was very low at 2.9 and I see on his profile he is supposed to be taking potassium 3xs daily. He needs to reach out to his PCP. Thanks, Allie

## 2024-12-09 NOTE — TELEPHONE ENCOUNTER
Attmepted to contact pt. No answer, unable to leave message.   Spoke with SO, Liliana   Discussed NP message, Liliana will discuss with pt and contact PCP

## 2025-01-09 ENCOUNTER — TELEPHONE (OUTPATIENT)
Facility: CLINIC | Age: 60
End: 2025-01-09
Payer: COMMERCIAL

## 2025-01-09 NOTE — TELEPHONE ENCOUNTER
Mailbox full; unable to LM to confirm appt for 1/16 at 3:15 with Dr. Welch and remind pt to have labs done prior to visit.

## 2025-01-15 NOTE — PROGRESS NOTES
[facility] Hematology/Oncology  PROGRESS NOTE - Follow-up Visit       Subjective:       Patient ID:   NAME: Vince Kwon : 1965     59 y.o. male    Referring Doc: Ugo Johnson  Other Physicians: Natalya Gutierrez    Chief Complaint:  Colon ca f/u    History of Present Illness:     Patient is being seen today in person for a regularly scheduled visit; The patient is here by himself.     He is doing ok with no new issues.  Bowel movements have been normal. He denies any CP, SOB, HA's or N/V.     He sees Dr Hoang next week    He still has been back to GI to get colonoscopy               .         ROS:   GEN: normal without any fever, night sweats or weight loss  HEENT: normal with no HA's, sore throat, stiff neck, changes in vision  CV: normal with no CP, SOB, PND, LIGHT or orthopnea  PULM: normal with no SOB, cough, hemoptysis, sputum or pleuritic pain  GI: normal with no abdominal pain, nausea, vomiting, constipation, diarrhea, melanotic stools, BRBPR, or hematemesis  : normal with no hematuria, dysuria  BREAST: normal with no mass, discharge, pain  SKIN: normal with no rash, erythema, bruising, or swelling    Allergies:  Review of patient's allergies indicates:  No Known Allergies    Medications:    Current Outpatient Medications:     amLODIPine (NORVASC) 10 MG tablet, Take 1 tablet (10 mg total) by mouth once daily., Disp: 90 tablet, Rfl: 1    ascorbic acid, vitamin C, (VITAMIN C) 1000 MG tablet, Take 1,000 mg by mouth once daily., Disp: , Rfl:     aspirin (ECOTRIN) 81 MG EC tablet, Take 81 mg by mouth., Disp: , Rfl:     atorvastatin (LIPITOR) 40 MG tablet, Take 1 tablet (40 mg total) by mouth once daily. Take one tablet daily., Disp: 90 tablet, Rfl: 1    azilsartan med-chlorthalidone (EDARBYCLOR) 40-25 mg Tab, Take 1 tablet by mouth Daily., Disp: 90 tablet, Rfl: 1    colchicine (COLCRYS) 0.6 mg tablet, 2 tabs  then 1 hr later take 1 for 24 hr period., Disp: 9 tablet, Rfl: 0    labetaloL (NORMODYNE)  200 MG tablet, Take 1 tablet (200 mg total) by mouth 2 (two) times daily., Disp: 180 tablet, Rfl: 1    magnesium 30 mg Tab, Take 1 tablet by mouth once., Disp: , Rfl:     potassium chloride SA (K-DUR,KLOR-CON) 20 MEQ tablet, Take 1 tablet (20 mEq total) by mouth 3 (three) times daily., Disp: 270 tablet, Rfl: 1    zinc 50 mg Tab, Take by mouth., Disp: , Rfl:     PMHx/PSHx Updates:  See patient's last visit with me on 1/18/2024  See H&P on 2/20/2018        Pathology:    3/7/2018:    FINAL DIAGNOSIS:  RIGHT COLON, HEMICOLECTOMY:    ULCERATED MODERATE TO MODERATELY WELL 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    THE TUMOR PENETRATES THE MUSCULARIS PROPRIA AND INVADES THE  PERICOLONIC FAT.    THE PROXIMAL (ILEAL) AND DISTAL (COLONIC) SURGICAL MARGINS OF  RESECTION ARE FREE OF    MALIGNANCY.    A TOTAL OF TWENTY-SEVEN (27) PERICOLONIC LYMPH NODES ARE FREE OF  METASTATIC TUMOR.    APPENDIX: REACTIVE MUCOSAL LYMPHOID HYPERPLASIA.     NO EVIDENCE OF METASTATIC CARCINOMA.    Procedure:  Right hemicolectomy  Specimen Length (if applicable):  <CR-*Specimen Length (if applicable):     12.0 cm>  Tumor Site:  Cecum  Tumor Location  Tumor Size:  Greatest dimension:  4.5 cm  Macroscopic Tumor Perforation:  Not identified  Macroscopic Intactness of Mesorectum:  Histologic Type:  Adenocarcinoma, focal mucinous (colloid) carcinoma  component.  Histologic Grade:  Low grade (well differentiated to moderately  differentiated)  Histologic Features Suggestive of Microsatellite Instability:  Intratumoral Lymphocytic Response (tumor-infiltrating lymphocytes):  Peritumor Lymphocytic Response (Crohn-like response):  Tumor Subtype and Differentiation:  Microscopic Tumor Extension:  Tumor invades through the muscularis  propria into the subserosal adipose tissue or the nonperitonealized  pericolic or perirectal soft tissues but does not  "extend to the serosal  surface  Margins    TNM Descriptors:  Primary Tumor (pT):  pT3:  Tumor invades through the muscularis propria  into pericolorectal tissues  Regional Lymph Nodes (pN):  pN0:  No regional lymph node metastasis  Distant Metastasis:  Not applicable.      Objective:     Vitals:  Blood pressure (!) 149/93, pulse 109, temperature 98.2 °F (36.8 °C), temperature source Temporal, resp. rate 17, height 5' 8" (1.727 m), weight 116.9 kg (257 lb 12.8 oz).        Physical Examination:   GEN: no apparent distress, comfortable; AAOx3; overweight  HEAD: atraumatic and normocephalic  EYES: no conjunctival pallor or muddiness, no icterus; normal pupil reaction to ambient light  ENT: OMM, no pharyngeal erythema, external bilateral ears WNL; no visible thrush or ulcers  NECK: no masses or swelling, trachea midline, no visible LAD/LN's   CV: no palpitations; no pedal edema; no noticeable JVD or neck vein distension  CHEST: Normal respiratory effort; chest wall breath movements symmetrical; no audible wheezing  ABDOM: non-distended; no bloating  MUSC/Skeletal: ROM normal; joints visibly normal; no deformities or arthropathy  EXTREM: no clubbing, cyanosis, inflammation or swelling  SKIN: no rashes, lesions, ulcers, petechiae or subcutaneous nodules  : no escamilla  NEURO: moving all 4 extremities; AAOx3; no tremors  PSYCH: normal mood, affect and behavior  LYMPH: no visible LN's or LAD              Labs:         BMP  Lab Results   Component Value Date     12/06/2024    K 2.9 (L) 12/06/2024    CL 98 12/06/2024    CO2 28 12/06/2024    BUN 17 12/06/2024    CREATININE 1.07 12/06/2024    CALCIUM 9.4 12/06/2024    ANIONGAP 7 (L) 07/17/2024    ESTGFRAFRICA >60.0 10/15/2021    EGFRNONAA >60.0 10/15/2021     Lab Results   Component Value Date    ALT 19 12/06/2024    AST 23 12/06/2024    ALKPHOS 64 07/17/2024    BILITOT 0.6 12/06/2024     Lab Results   Component Value Date    CEA 1.4 07/17/2024 "           Radiology/Diagnostic Studies:    CT Scans 1/11/2024:      IMPRESSION:     No findings of recurrence of malignancy or metastatic disease.    Ct Scans 12/9/2022:    1. Right pulmonary nodule is unchanged.  2. No adenopathy. There are a few nonenlarged lymph nodes in the central abdomen.         CT 12/3/2021:  IMPRESSION:     No findings of metastatic disease or significant detrimental interval change.     Small noncalcified right-sided pulmonary nodules.     Additional observations as above.         CT scans  10/9/2020:    IMPRESSION:     1.  No acute intra-abdominal abnormalities identified.  2.  Small focal groundglass nodular-like density in the right  lateral ventricle lobes unchanged.  3.  Mild bilateral dependent subsegmental atelectasis.        CT chest scan  3/27/2020:    Impression       Stable soft tissue attenuation noncalcified pulmonary nodules with no new concerning pulmonary nodule or mass identified.  No findings to specifically suggest metastatic disease.           CT scans  9/20/2019:    Impression       No evidence of recurrence or metastatic disease    Stable tiny noncalcified nodules in the right lung           Ct scan 2/1/2019:    IMPRESSION:  1. Interval development of several prominent to borderline enlarged lymph nodes  within the central mesentery and right lower quadrant, nonspecific. These are  suspicious for metastatic disease, given lymphadenopathy seen on the patient's  original preoperative CT of 02/23/2018. Correlation with serum tumor markers,  and consideration for further evaluation with PET CT, are recommended.  2. No additional evidence for metastatic disease in the abdomen or the pelvis.          CT scans 7/27/2018:    IMPRESSION:    1. Stable appearance of subcentimeter right middle lobe and right upper lobe  pulmonary nodules.  2. Status post partial colonic resection with expected postoperative changes,  as discussed above. No residual mass or pathologically enlarged  lymph nodes in  the abdomen or pelvis.  3. Incidental findings as above.            PET/CT: 4/11/2018    IMPRESSION:    1. Persistent mild wall thickening centered about site of ileocolonic  anastomosis with associated FDG uptake is most likely postoperative in nature  and due to residual inflammation. Residual malignancy cannot be excluded on the  basis of imaging alone, and correlation with surgical resection margins is  requested.    2. No current convincing evidence of metastatic disease.    3. 3 mm nodular density which is vague in superior right middle lobe is nonspecific. CT thorax without IV contrast follow-up is recommended within 3-6  months to simply document stability.          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 Comparison: None Findings: Cardiac silhouette size is normal. Lungs are clear without effusion. No pneumothorax. No acute osseous abnormality. IMPRESSION: No acute pulmonary process. Read and electronically signed by: Shubham Marino MD on 2/28/2018 6:22 PM CST SHUBHAM MARINO MD    Ct Abdomen Pelvis With Contrast    Result Date: 2/23/2018  CMS MANDATED QUALITY DATA - CT RADIATION ? 436 All CT scans at this facility utilize dose modulation, iterative reconstruction, and/or weight based dosing when appropriate to reduce radiation dose to as low as reasonably achievable. CLINICAL HISTORY: 52 years (1965) Male K63.9 TECHNIQUE: MDI ABDOMEN AND PELVIS W  CONTRAST CT. 296 images obtained. Axial CT images of the abdomen and pelvis were obtained from the dome of the diaphragm to the proximal thigh. CONTRAST: 100 mL of IV Omni 350 was administered uneventfully by IV. Oral contrast was also administered COMPARISON: None available. FINDINGS: Lower Thorax: The lung bases are clear. The heart is normal in size and there is no pericardial effusion. CT Abdomen: Liver: The liver is  normal size and imaging appearance. Gallbladder: The gallbladder is unremarkable without acute cholecystitis. Biliary Tree: There is no intra or extrahepatic duct dilation. Spleen: The spleen is normal. Pancreas: The pancreas is normal. Adrenal Glands: The adrenal glands appear within normal limits. Kidneys: The kidneys are normal in imaging appearance without hydronephrosis or hydroureter. Vasculature: The aorta is normal in course and caliber. Lymph nodes: No abdominal lymphadenopathy is seen by size criteria. Intraperitoneal structures: There is no ascites. Bowel:  There is an apple core like lesion in the ascending colon extending over a length of approximately 5 cm (10 o'clock-8 o'clock position involving a majority of the circumference) the soft tissue mass measures approximately 2 cm in thickness. There are numerous small rounded lymph nodes in the cecal mesentery, none of which are enlarged by size criteria, however given the morphology and location these may be involved, for example a 15 x 7 mm node (image 121). Abdominal wall: The abdominal wall and musculature are normal. Musculoskeletal: There is a transitional lumbosacral vertebral body (S1) with a hypoplastic disc at S1-S2. There is moderate to severe disc height loss at L4-L5. No aggressive appearing lytic or blastic lesion is identified. CT Pelvis: Bladder: The urinary bladder is within normal limits. Reproductive Organs: The prostate and seminal vesicles are within normal limits. Pelvic Lymph nodes: No pelvic lymphadenopathy or pelvic mass is identified. IMPRESSION: 1. Apple-core like lesion in the descending colon most consistent with a primary colonic adenocarcinoma, there is no evidence of obstruction, pneumatosis intra-abdominal free air or abscess. 2. No lymphadenopathy by size criteria and no finding to specifically suggest metastatic disease in the abdomen or pelvis. Read and electronically signed by: Alexsander Colvin MD on 2/23/2018 9:53 AM  "CST SINGH PINEDA MD      I have reviewed all available lab results and radiology reports.    Assessment/Plan:   (1) 58 y.o. male with diagnosis of right colon mass found on recent colonoscopy on 2/16/2018 with Dr Johnson.  - he had been seen by Dr Gutierrez with [facility]   - s/p right colectomy with Dr Gutierrez on 3/7/2018  - 27 LN's were all negative  - T3 N0 and Stage IIA  - low grade adenocarcinoma  - PET scan on 4/11/2018 with no definitive evidence of ant metastatic process  - repeat CEA was 2.1  - will most likely not need chemotherapy if he remains a Stage II  - MMR was negative but all of all JESSICA was "stable" which has been shown to illicit a poorer prognosis in general but would not changes the current plan of therapy (discussed with the patient in detail)  - prior CT scans on 7/27/2018 which appeared to be stable; however, repeat CT on 2/1/2019 showed some increase in LN's which needed to be addressed with PET scans but his insurance declined   - latest CT's on 9/20/2019 with no evidence of recurrent cancer  - last colonoscopy was good per patient with another one planned for 3 yrs      10/12/2020:  - latest CT scans on 10/9/2020 appear to be stable  - CEA level good at 1.4  - no new bowel issues  - repeat scope in about 2 yrs per patient    4/29/2021:  - he had recent labs done with Dr Driver but did not have CEA done  - he was supposed to get repeat scans this month but has not done so    10/28/2021:  - insurance denied approval for his PET  - will look into getting CT scans instead  - CEA was 1.7  - he sees GI again in March 2022    5/10/2022:  - He last saw Dr Hoang on  3/18/2022   - he is seeing Dr Johnson again in June 2022 for repeat scopes  -  He last had CT scans on 12/3/2021  - CEA was at 1.6    12/13/2022:  - recent CT scans on 12/9 appear to be relatively stable  - CEA was 2.0  - he was supposed to get repeat scope in June 2022 but did not due to insurance  - asked patient to reach " out to GI again     7/20/2023:  - he was suppose to get repeat scope in April 2023 but did not do so due to insurance declining authorization per patient - will reach out to Dr deutsch to see what is the issue  - CEA was good 1.9  - no significant anemia issues  - repeat scans in Dec 2023    1/18/2024:  - bowels moving ok  - still has not followed up with GI for repeat endscopy due to insurance issues  - CEA at 1.6  - CT scans 1/11/2024 negative for any recurrence           (2) HTN and hypercholesterolemia     (3) Prior mini-CVA - HTN related; no subsequent deficits; sounds like he had a TIA    (4) RML lung nodule on PEt at 3mm with no FDG uptake; CT stable;  - he last saw Dr Crockett with pulmonary on 4/2/2019  - latest Ct was stable on 9/20/2019 and again on 3/27/2020    1. Primary adenocarcinoma of ascending colon              PLAN:  1. Refer to Dr Shaw with GI for evaluation for further endoscopies - he sees Dr Hoang next week  2. F/u with PCP  3. F/u with GI and Pulm as directed  4. Encouraged compliance with f/u with Pulm, etc  5. Check up to date labs incl CEA every 6 months  6. RTC 12 months    Fax note to Natalya Deutsch Jimmy L. Jr., MD, [facility]    Discussion:       Total Time spent on patient:    I spent over 25 mins of time with the patient. Reviewed results of the recently ordered labs, tests, reports and studies; made directives with regards to the results. Over half of this time was spent couseling and coordinating care, making treatment and analytical decisions; ordering necessary labs, tests and studies; and discussing treatment options and setting up treatment plan(s) if indicated.    COVID-19 Discussion:    I had long discussion with patient and any applicable family about the COVID-19 coronavirus epidemic and the recommended precautions with regard to cancer and/or hematology patients. I have re-iterated the CDC recommendations for adequate hand washing, use of hand  -like products, and coughing into elbow, etc. In addition, especially for our patients who are on chemotherapy and/or our otherwise immunocompromised patients, I have recommended avoidance of crowds, including movie theaters, restaurants, churches, etc. I have recommended avoidance of any sick or symptomatic family members and/or friends. I have also recommended avoidance of any raw and unwashed food products, and general avoidance of food items that have not been prepared by themselves. The patient has been asked to call us immediately with any symptom developments, issues, questions or other general concerns.         I have explained all of the above in detail and the patient understands all of the current recommendation(s). I have answered all of their questions to the best of my ability and to their complete satisfaction.   The patient is to continue with the current management plan.            Electronically signed by Carlo Welch MD

## 2025-01-16 ENCOUNTER — TELEPHONE (OUTPATIENT)
Dept: GASTROENTEROLOGY | Facility: CLINIC | Age: 60
End: 2025-01-16
Payer: COMMERCIAL

## 2025-01-16 ENCOUNTER — OFFICE VISIT (OUTPATIENT)
Facility: CLINIC | Age: 60
End: 2025-01-16
Payer: COMMERCIAL

## 2025-01-16 VITALS
TEMPERATURE: 98 F | HEIGHT: 68 IN | WEIGHT: 257.81 LBS | SYSTOLIC BLOOD PRESSURE: 149 MMHG | BODY MASS INDEX: 39.07 KG/M2 | DIASTOLIC BLOOD PRESSURE: 93 MMHG | RESPIRATION RATE: 17 BRPM | HEART RATE: 109 BPM

## 2025-01-16 DIAGNOSIS — C18.2 PRIMARY ADENOCARCINOMA OF ASCENDING COLON: Primary | ICD-10-CM

## 2025-01-16 PROCEDURE — 3080F DIAST BP >= 90 MM HG: CPT | Mod: CPTII,S$GLB,, | Performed by: INTERNAL MEDICINE

## 2025-01-16 PROCEDURE — 3077F SYST BP >= 140 MM HG: CPT | Mod: CPTII,S$GLB,, | Performed by: INTERNAL MEDICINE

## 2025-01-16 PROCEDURE — 99214 OFFICE O/P EST MOD 30 MIN: CPT | Mod: S$GLB,,, | Performed by: INTERNAL MEDICINE

## 2025-01-16 PROCEDURE — 1159F MED LIST DOCD IN RCRD: CPT | Mod: CPTII,S$GLB,, | Performed by: INTERNAL MEDICINE

## 2025-01-16 PROCEDURE — 1160F RVW MEDS BY RX/DR IN RCRD: CPT | Mod: CPTII,S$GLB,, | Performed by: INTERNAL MEDICINE

## 2025-01-16 PROCEDURE — 99999 PR PBB SHADOW E&M-EST. PATIENT-LVL V: CPT | Mod: PBBFAC,,, | Performed by: INTERNAL MEDICINE

## 2025-01-16 PROCEDURE — G2211 COMPLEX E/M VISIT ADD ON: HCPCS | Mod: S$GLB,,, | Performed by: INTERNAL MEDICINE

## 2025-01-16 PROCEDURE — 3008F BODY MASS INDEX DOCD: CPT | Mod: CPTII,S$GLB,, | Performed by: INTERNAL MEDICINE

## 2025-01-16 NOTE — TELEPHONE ENCOUNTER
----- Message from Sarah sent at 1/16/2025  3:29 PM CST -----  Please contact to schedule. Thank you

## 2025-01-28 ENCOUNTER — LAB VISIT (OUTPATIENT)
Dept: LAB | Facility: HOSPITAL | Age: 60
End: 2025-01-28
Attending: INTERNAL MEDICINE
Payer: COMMERCIAL

## 2025-01-28 DIAGNOSIS — C18.2 PRIMARY ADENOCARCINOMA OF ASCENDING COLON: ICD-10-CM

## 2025-01-28 LAB
ALBUMIN SERPL BCP-MCNC: 4 G/DL (ref 3.5–5.2)
ALP SERPL-CCNC: 60 U/L (ref 55–135)
ALT SERPL W/O P-5'-P-CCNC: 21 U/L (ref 10–44)
ANION GAP SERPL CALC-SCNC: 8 MMOL/L (ref 8–16)
AST SERPL-CCNC: 20 U/L (ref 10–40)
BASOPHILS # BLD AUTO: 0.02 K/UL (ref 0–0.2)
BASOPHILS NFR BLD: 0.3 % (ref 0–1.9)
BILIRUB SERPL-MCNC: 0.8 MG/DL (ref 0.1–1)
BUN SERPL-MCNC: 16 MG/DL (ref 8–23)
CALCIUM SERPL-MCNC: 9.6 MG/DL (ref 8.7–10.5)
CEA SERPL-MCNC: 1.4 NG/ML (ref 0–5)
CHLORIDE SERPL-SCNC: 99 MMOL/L (ref 95–110)
CO2 SERPL-SCNC: 32 MMOL/L (ref 23–29)
CREAT SERPL-MCNC: 1.1 MG/DL (ref 0.5–1.4)
DIFFERENTIAL METHOD BLD: ABNORMAL
EOSINOPHIL # BLD AUTO: 0.1 K/UL (ref 0–0.5)
EOSINOPHIL NFR BLD: 1 % (ref 0–8)
ERYTHROCYTE [DISTWIDTH] IN BLOOD BY AUTOMATED COUNT: 13.4 % (ref 11.5–14.5)
EST. GFR  (NO RACE VARIABLE): >60 ML/MIN/1.73 M^2
GLUCOSE SERPL-MCNC: 94 MG/DL (ref 70–110)
HCT VFR BLD AUTO: 44.3 % (ref 40–54)
HGB BLD-MCNC: 14.7 G/DL (ref 14–18)
IMM GRANULOCYTES # BLD AUTO: 0.01 K/UL (ref 0–0.04)
IMM GRANULOCYTES NFR BLD AUTO: 0.1 % (ref 0–0.5)
LYMPHOCYTES # BLD AUTO: 2.5 K/UL (ref 1–4.8)
LYMPHOCYTES NFR BLD: 36.8 % (ref 18–48)
MCH RBC QN AUTO: 27.7 PG (ref 27–31)
MCHC RBC AUTO-ENTMCNC: 33.2 G/DL (ref 32–36)
MCV RBC AUTO: 83 FL (ref 82–98)
MONOCYTES # BLD AUTO: 0.5 K/UL (ref 0.3–1)
MONOCYTES NFR BLD: 7.9 % (ref 4–15)
NEUTROPHILS # BLD AUTO: 3.6 K/UL (ref 1.8–7.7)
NEUTROPHILS NFR BLD: 53.9 % (ref 38–73)
NRBC BLD-RTO: 0 /100 WBC
PLATELET # BLD AUTO: 230 K/UL (ref 150–450)
PMV BLD AUTO: 8.9 FL (ref 9.2–12.9)
POTASSIUM SERPL-SCNC: 3.4 MMOL/L (ref 3.5–5.1)
PROT SERPL-MCNC: 7.2 G/DL (ref 6–8.4)
RBC # BLD AUTO: 5.31 M/UL (ref 4.6–6.2)
SODIUM SERPL-SCNC: 139 MMOL/L (ref 136–145)
WBC # BLD AUTO: 6.68 K/UL (ref 3.9–12.7)

## 2025-01-28 PROCEDURE — 80053 COMPREHEN METABOLIC PANEL: CPT | Performed by: INTERNAL MEDICINE

## 2025-01-28 PROCEDURE — 36415 COLL VENOUS BLD VENIPUNCTURE: CPT | Performed by: INTERNAL MEDICINE

## 2025-01-28 PROCEDURE — 85025 COMPLETE CBC W/AUTO DIFF WBC: CPT | Performed by: INTERNAL MEDICINE

## 2025-01-28 PROCEDURE — 82378 CARCINOEMBRYONIC ANTIGEN: CPT | Performed by: INTERNAL MEDICINE

## 2025-07-03 ENCOUNTER — LAB VISIT (OUTPATIENT)
Dept: LAB | Facility: HOSPITAL | Age: 60
End: 2025-07-03
Attending: INTERNAL MEDICINE
Payer: COMMERCIAL

## 2025-07-03 DIAGNOSIS — R73.03 DIABETES MELLITUS, LATENT: ICD-10-CM

## 2025-07-03 DIAGNOSIS — C18.2 PRIMARY ADENOCARCINOMA OF ASCENDING COLON: ICD-10-CM

## 2025-07-03 DIAGNOSIS — E78.00 PURE HYPERCHOLESTEROLEMIA: Primary | ICD-10-CM

## 2025-07-03 LAB
ABSOLUTE EOSINOPHIL (SMH): 0.24 K/UL
ABSOLUTE MONOCYTE (SMH): 0.41 K/UL (ref 0.3–1)
ABSOLUTE NEUTROPHIL COUNT (SMH): 2.9 K/UL (ref 1.8–7.7)
ALBUMIN SERPL-MCNC: 4.1 G/DL (ref 3.5–5.2)
ALP SERPL-CCNC: 69 UNIT/L (ref 55–135)
ALT SERPL-CCNC: 21 UNIT/L (ref 10–44)
ANION GAP (SMH): 9 MMOL/L (ref 8–16)
AST SERPL-CCNC: 20 UNIT/L (ref 10–40)
BASOPHILS # BLD AUTO: 0.02 K/UL
BASOPHILS NFR BLD AUTO: 0.4 %
BILIRUB SERPL-MCNC: 0.8 MG/DL (ref 0.1–1)
BUN SERPL-MCNC: 14 MG/DL (ref 8–23)
CALCIUM SERPL-MCNC: 9.9 MG/DL (ref 8.7–10.5)
CARCINOEMBRYONIC ANTIGEN (SMH): 2.4 NG/ML
CHLORIDE SERPL-SCNC: 101 MMOL/L (ref 95–110)
CHOLEST SERPL-MCNC: 183 MG/DL (ref 120–199)
CHOLEST/HDLC SERPL: 3.3 {RATIO} (ref 2–5)
CO2 SERPL-SCNC: 31 MMOL/L (ref 23–29)
CREAT SERPL-MCNC: 1.2 MG/DL (ref 0.5–1.4)
EAG (SMH): 128 MG/DL (ref 68–131)
ERYTHROCYTE [DISTWIDTH] IN BLOOD BY AUTOMATED COUNT: 13.5 % (ref 11.5–14.5)
GFR SERPLBLD CREATININE-BSD FMLA CKD-EPI: >60 ML/MIN/1.73/M2
GLUCOSE SERPL-MCNC: 102 MG/DL (ref 70–110)
HBA1C MFR BLD: 6.1 % (ref 4.5–6.2)
HCT VFR BLD AUTO: 48.3 % (ref 40–54)
HDLC SERPL-MCNC: 56 MG/DL (ref 40–75)
HDLC SERPL: 30.6 % (ref 20–50)
HGB BLD-MCNC: 15.6 GM/DL (ref 14–18)
IMM GRANULOCYTES # BLD AUTO: 0.02 K/UL (ref 0–0.04)
IMM GRANULOCYTES NFR BLD AUTO: 0.4 % (ref 0–0.5)
LDLC SERPL CALC-MCNC: 109.4 MG/DL (ref 63–159)
LYMPHOCYTES # BLD AUTO: 1.74 K/UL (ref 1–4.8)
MCH RBC QN AUTO: 27 PG (ref 27–31)
MCHC RBC AUTO-ENTMCNC: 32.3 G/DL (ref 32–36)
MCV RBC AUTO: 84 FL (ref 82–98)
NONHDLC SERPL-MCNC: 127 MG/DL
NUCLEATED RBC (/100WBC) (SMH): 0 /100 WBC
PLATELET # BLD AUTO: 239 K/UL (ref 150–450)
PMV BLD AUTO: 9.7 FL (ref 9.2–12.9)
POTASSIUM SERPL-SCNC: 3.2 MMOL/L (ref 3.5–5.1)
PROT SERPL-MCNC: 7.4 GM/DL (ref 6–8.4)
RBC # BLD AUTO: 5.77 M/UL (ref 4.6–6.2)
RELATIVE EOSINOPHIL (SMH): 4.5 % (ref 0–8)
RELATIVE LYMPHOCYTE (SMH): 32.6 % (ref 18–48)
RELATIVE MONOCYTE (SMH): 7.7 % (ref 4–15)
RELATIVE NEUTROPHIL (SMH): 54.4 % (ref 38–73)
SODIUM SERPL-SCNC: 141 MMOL/L (ref 136–145)
TRIGL SERPL-MCNC: 88 MG/DL (ref 30–150)
WBC # BLD AUTO: 5.33 K/UL (ref 3.9–12.7)

## 2025-07-03 PROCEDURE — 82378 CARCINOEMBRYONIC ANTIGEN: CPT

## 2025-07-03 PROCEDURE — 82040 ASSAY OF SERUM ALBUMIN: CPT

## 2025-07-03 PROCEDURE — 85025 COMPLETE CBC W/AUTO DIFF WBC: CPT

## 2025-07-03 PROCEDURE — 82465 ASSAY BLD/SERUM CHOLESTEROL: CPT

## 2025-07-03 PROCEDURE — 83036 HEMOGLOBIN GLYCOSYLATED A1C: CPT

## 2025-07-03 PROCEDURE — 36415 COLL VENOUS BLD VENIPUNCTURE: CPT

## 2025-07-07 ENCOUNTER — RESULTS FOLLOW-UP (OUTPATIENT)
Facility: CLINIC | Age: 60
End: 2025-07-07

## 2025-07-08 ENCOUNTER — TELEPHONE (OUTPATIENT)
Facility: CLINIC | Age: 60
End: 2025-07-08
Payer: COMMERCIAL

## 2025-07-08 NOTE — TELEPHONE ENCOUNTER
----- Message from Carlo Welch MD sent at 7/7/2025  5:21 PM CDT -----  Make sure PCP and patient aware of his low potassium please  ----- Message -----  From: Lab, Background User  Sent: 7/3/2025  10:37 AM CDT  To: Carlo Welch MD

## 2025-07-08 NOTE — TELEPHONE ENCOUNTER
Results previously sent to Dr Hoang per Dr Welch. Attempted to call patient with above, no answer. Voicemail box full and unable to leave a voicemail.